# Patient Record
Sex: FEMALE | Race: WHITE | Employment: OTHER | URBAN - METROPOLITAN AREA
[De-identification: names, ages, dates, MRNs, and addresses within clinical notes are randomized per-mention and may not be internally consistent; named-entity substitution may affect disease eponyms.]

---

## 2017-01-30 RX ORDER — METOPROLOL SUCCINATE 50 MG/1
TABLET, EXTENDED RELEASE ORAL
Qty: 90 TABLET | Refills: 0 | Status: SHIPPED | OUTPATIENT
Start: 2017-01-30 | End: 2017-04-05

## 2017-02-15 RX ORDER — SIMVASTATIN 20 MG
TABLET ORAL
Qty: 90 TABLET | Refills: 0 | Status: SHIPPED | OUTPATIENT
Start: 2017-02-15 | End: 2017-05-30

## 2017-02-15 RX ORDER — LEVOTHYROXINE SODIUM 50 MCG
TABLET ORAL
Qty: 90 TABLET | Refills: 0 | Status: SHIPPED | OUTPATIENT
Start: 2017-02-15 | End: 2017-05-24

## 2017-03-28 RX ORDER — RANITIDINE 300 MG/1
TABLET ORAL
Qty: 90 TABLET | Refills: 0 | Status: SHIPPED | OUTPATIENT
Start: 2017-03-28 | End: 2017-06-27

## 2017-04-05 ENCOUNTER — OFFICE VISIT (OUTPATIENT)
Dept: FAMILY MEDICINE CLINIC | Facility: CLINIC | Age: 82
End: 2017-04-05

## 2017-04-05 ENCOUNTER — LAB ENCOUNTER (OUTPATIENT)
Dept: LAB | Age: 82
End: 2017-04-05
Attending: FAMILY MEDICINE
Payer: MEDICARE

## 2017-04-05 ENCOUNTER — HOSPITAL ENCOUNTER (OUTPATIENT)
Dept: GENERAL RADIOLOGY | Age: 82
Discharge: HOME | End: 2017-04-05
Attending: FAMILY MEDICINE
Payer: MEDICARE

## 2017-04-05 VITALS
DIASTOLIC BLOOD PRESSURE: 64 MMHG | HEART RATE: 60 BPM | WEIGHT: 176 LBS | HEIGHT: 66 IN | BODY MASS INDEX: 28.28 KG/M2 | RESPIRATION RATE: 16 BRPM | SYSTOLIC BLOOD PRESSURE: 118 MMHG | TEMPERATURE: 98 F

## 2017-04-05 DIAGNOSIS — J41.0 SIMPLE CHRONIC BRONCHITIS (HCC): ICD-10-CM

## 2017-04-05 DIAGNOSIS — E78.00 PURE HYPERCHOLESTEROLEMIA: ICD-10-CM

## 2017-04-05 DIAGNOSIS — C67.9 MALIGNANT NEOPLASM OF URINARY BLADDER, UNSPECIFIED SITE (HCC): ICD-10-CM

## 2017-04-05 DIAGNOSIS — I10 ESSENTIAL HYPERTENSION, BENIGN: ICD-10-CM

## 2017-04-05 DIAGNOSIS — R53.83 FATIGUE, UNSPECIFIED TYPE: ICD-10-CM

## 2017-04-05 DIAGNOSIS — J44.1 COPD EXACERBATION (HCC): ICD-10-CM

## 2017-04-05 DIAGNOSIS — Z11.59 SCREENING FOR VIRAL AND CHLAMYDIAL DISEASES: ICD-10-CM

## 2017-04-05 DIAGNOSIS — Z11.8 SCREENING FOR VIRAL AND CHLAMYDIAL DISEASES: ICD-10-CM

## 2017-04-05 DIAGNOSIS — Z12.11 SCREENING FOR COLON CANCER: ICD-10-CM

## 2017-04-05 DIAGNOSIS — Z12.31 VISIT FOR SCREENING MAMMOGRAM: ICD-10-CM

## 2017-04-05 DIAGNOSIS — K21.9 GASTROESOPHAGEAL REFLUX DISEASE WITHOUT ESOPHAGITIS: ICD-10-CM

## 2017-04-05 DIAGNOSIS — E55.9 VITAMIN D DEFICIENCY: ICD-10-CM

## 2017-04-05 DIAGNOSIS — F33.8 SEASONAL DEPRESSION (HCC): ICD-10-CM

## 2017-04-05 DIAGNOSIS — J30.1 SEASONAL ALLERGIC RHINITIS DUE TO POLLEN: ICD-10-CM

## 2017-04-05 DIAGNOSIS — L40.50 PSORIATIC ARTHRITIS (HCC): Primary | ICD-10-CM

## 2017-04-05 DIAGNOSIS — M47.812 FACET ARTHRITIS OF CERVICAL REGION: ICD-10-CM

## 2017-04-05 DIAGNOSIS — J41.0 SMOKERS' COUGH (HCC): ICD-10-CM

## 2017-04-05 DIAGNOSIS — L40.9 PSORIASIS: ICD-10-CM

## 2017-04-05 PROCEDURE — 81001 URINALYSIS AUTO W/SCOPE: CPT

## 2017-04-05 PROCEDURE — G0439 PPPS, SUBSEQ VISIT: HCPCS | Performed by: FAMILY MEDICINE

## 2017-04-05 PROCEDURE — 82306 VITAMIN D 25 HYDROXY: CPT

## 2017-04-05 PROCEDURE — 85025 COMPLETE CBC W/AUTO DIFF WBC: CPT

## 2017-04-05 PROCEDURE — 80061 LIPID PANEL: CPT

## 2017-04-05 PROCEDURE — 71020 XR CHEST PA + LAT CHEST (CPT=71020): CPT

## 2017-04-05 PROCEDURE — 84443 ASSAY THYROID STIM HORMONE: CPT

## 2017-04-05 PROCEDURE — 36415 COLL VENOUS BLD VENIPUNCTURE: CPT

## 2017-04-05 PROCEDURE — 96160 PT-FOCUSED HLTH RISK ASSMT: CPT | Performed by: FAMILY MEDICINE

## 2017-04-05 PROCEDURE — 80053 COMPREHEN METABOLIC PANEL: CPT

## 2017-04-05 PROCEDURE — 99397 PER PM REEVAL EST PAT 65+ YR: CPT | Performed by: FAMILY MEDICINE

## 2017-04-05 RX ORDER — DEXTROMETHORPHAN HYDROBROMIDE AND PROMETHAZINE HYDROCHLORIDE 15; 6.25 MG/5ML; MG/5ML
5 SYRUP ORAL 4 TIMES DAILY PRN
Qty: 200 ML | Refills: 0 | Status: SHIPPED | OUTPATIENT
Start: 2017-04-05 | End: 2017-04-12

## 2017-04-05 RX ORDER — VALSARTAN AND HYDROCHLOROTHIAZIDE 160; 25 MG/1; MG/1
1 TABLET ORAL
Qty: 90 TABLET | Refills: 4 | Status: SHIPPED | OUTPATIENT
Start: 2017-04-05 | End: 2018-08-06

## 2017-04-05 RX ORDER — LEVOFLOXACIN 500 MG/1
500 TABLET, FILM COATED ORAL DAILY
Qty: 7 TABLET | Refills: 0 | Status: SHIPPED | OUTPATIENT
Start: 2017-04-05 | End: 2017-07-25

## 2017-04-05 RX ORDER — PREDNISONE 20 MG/1
60 TABLET ORAL DAILY
Qty: 15 TABLET | Refills: 0 | Status: SHIPPED | OUTPATIENT
Start: 2017-04-05 | End: 2018-04-05 | Stop reason: ALTCHOICE

## 2017-04-05 RX ORDER — METOPROLOL SUCCINATE 50 MG/1
50 TABLET, EXTENDED RELEASE ORAL
Qty: 90 TABLET | Refills: 4 | Status: SHIPPED | OUTPATIENT
Start: 2017-04-05 | End: 2018-05-16

## 2017-04-05 NOTE — PROGRESS NOTES
Lynn Daniels is a 80year old female who presents for a MA Supervisit.     HPI:      Patient Care Team: Patient Care Team:  Sandeep Uriostegui MD as PCP - General (Family Practice)  Robert Collado MD as Consulting Physician (Brie Graves)  Ridge Mead MD a spray by Nasal route 2 (two) times daily. Disp: 1 Bottle Rfl: 3   MetroNIDAZOLE 1 % Apply Externally Gel  Disp:  Rfl: 4   methotrexate (RHEUMATREX) 2.5 MG Oral Tab  Disp:  Rfl:    folic acid (FOLVITE) 1 MG Oral Tab  Disp:  Rfl: 11   Fluocinonide (LIDEX) 0. 0.961 04/17/2014       Lab Results  Component Value Date   BUN 26* 04/15/2016   BUN 23 12/01/2015   BUN 44* 08/21/2015   CREATSERUM 0.98 04/15/2016   CREATSERUM 0.95 12/01/2015   CREATSERUM 1.50* 08/21/2015       General Health     In the past six months, Screening (PHQ-2/PHQ-9): Over the LAST 2 WEEKS   Little interest or pleasure in doing things (over the last two weeks)?: Several days    Feeling down, depressed, or hopeless (over the last two weeks)?: Several days    PHQ-2 SCORE: 2        Advance Directiv Dexascan Every two years Last Dexa Scan:   XR DEXA BONE DENSITOMETRY (CPT=77080) 04/22/2016    No flowsheet data found.     Pap and Pelvic      Pap: Every 3 yrs age 21-65 or Pap+HPV every 5 yrs age 33-67, age 72 and older at high risk   There are no prev data found.     Creat/alb ratio  Annually      LDL  Annually LDL CHOLESTEROL (mg/dL)   Date Value   04/15/2016 49     LDL-CHOLESTEROL (mg/dL (calc))   Date Value   07/22/2011 66     LDL CHOLESTROL (mg/dL)   Date Value   04/17/2014 81    No flowsheet data fo Application topically 2 (two) times daily. Disp: 60 g Rfl: 0   Clobetasol Propionate 0.05 % Apply Externally Ointment Apply  topically 2 (two) times daily.  Disp:  Rfl:    Acetaminophen (TYLENOL ARTHRITIS PAIN) 650 MG Oral Tab CR 2 TABLETS EVERY 8 HOURS AS Comment BTS Cysto- Dr. Caty Ibrahim HISTORY  7/12/16    Comment HELADIO Cysto- Dr. Raine Deluna      Family History   Problem Relation Age of Onset   • Cancer Father      liver   • Diabetes Father    • Heart Disorder Mother    • Diabetes Mother       Sonu Mata tenderness  : deferred  RECTAL: deferred  MUSCULOSKELETAL: back is not tender, FROM of the back  EXTREMITIES: no cyanosis, clubbing or edema  NEURO: Oriented times three, cranial nerves are intact, motor and sensory are grossly intact    ASSESSMENT AND P mouth daily. levofloxacin (LEVAQUIN) 500 MG Oral Tab 7 tablet 0      Sig: Take 1 tablet (500 mg total) by mouth daily. Promethazine-DM 6.25-15 MG/5ML Oral Syrup 200 mL 0      Sig: Take 5 mL by mouth 4 (four) times daily as needed.                Ruben Palencia 07/01/2011   • HEP B 07/01/2011   • Influenza 10/15/2007, 09/25/2008, 09/25/2009, 09/27/2010, 09/13/2011, 09/16/2013   • Influenza Vaccine, High Dose, Preserv Free 09/18/2014   • Pneumococcal (Prevnar 13) 04/15/2016   • TDAP 07/01/2011

## 2017-04-05 NOTE — PATIENT INSTRUCTIONS
Prevention Guidelines, Women Ages 72 and Older  Screening tests and vaccines are an important part of managing your health. Health counseling is essential, too. Below are guidelines for these, for women ages 72 and older.  Talk with your healthcare provid Lung cancer Adults age 54 to [de-identified] who have smoked Yearly screening in smokers with 30 pack-year history of smoking or who quit within 15 years   Obesity All women in this age group At routine exams   Osteoporosis All women in this age group Bone density test Counseling Who needs it How often   Diet and exercise Women who are overweight or obese When diagnosed, and then at routine exams   Fall prevention (exercise and vitamin D supplements) All women in this age group At routine exams   Sexually transmitted inf

## 2017-04-06 ENCOUNTER — APPOINTMENT (OUTPATIENT)
Dept: LAB | Age: 82
End: 2017-04-06
Attending: FAMILY MEDICINE
Payer: MEDICARE

## 2017-04-06 DIAGNOSIS — N39.0 URINARY TRACT INFECTION, SITE UNSPECIFIED: ICD-10-CM

## 2017-04-06 DIAGNOSIS — N39.0 URINARY TRACT INFECTION, SITE UNSPECIFIED: Primary | ICD-10-CM

## 2017-04-06 PROCEDURE — 87086 URINE CULTURE/COLONY COUNT: CPT | Performed by: FAMILY MEDICINE

## 2017-04-10 ENCOUNTER — TELEPHONE (OUTPATIENT)
Dept: FAMILY MEDICINE CLINIC | Facility: CLINIC | Age: 82
End: 2017-04-10

## 2017-04-10 NOTE — TELEPHONE ENCOUNTER
LMOM with Dr Murray's instructions to go to ER. I asked that she call back to confirm she got our message. LM on daughter's cell, Francisco Lane to call back. We would like another number to reach Janette Arita or Angelic Canales give Francisco Lane message to give to Janette Dose.   OK to call

## 2017-04-10 NOTE — TELEPHONE ENCOUNTER
Urine and GC/chlam neg    ----- Message from Matt Tamez MD sent at 4/8/2017  8:59 AM CDT -----  Normal results.      Notes Recorded by Pasquale Egan MD on 4/7/2017 at 12:37 PM  Normal results.

## 2017-04-10 NOTE — TELEPHONE ENCOUNTER
I called pt at  and verified 2 patient identifiers: name & . I discussed results with patient. All questions answered.

## 2017-04-10 NOTE — TELEPHONE ENCOUNTER
Pt states she is not any better. She has today and tomorrow for prednisone, on day 5 of 7 of Levaquin, cough syrup, and taking fluticasone nasal spray. Pt states she has a productive wet sounding cough.   Phlegm is thick and light yellow, nasal drainage i

## 2017-04-11 NOTE — TELEPHONE ENCOUNTER
Pt returned call, she states she got the message yesterday to go to ER but couldn't go because she was not home all day. She states there is a Limo waiting for her to take her to the airport, she is going to [place] for a week.   I explained to pt

## 2017-05-24 RX ORDER — LEVOTHYROXINE SODIUM 50 MCG
TABLET ORAL
Qty: 90 TABLET | Refills: 1 | Status: SHIPPED | OUTPATIENT
Start: 2017-05-24 | End: 2017-11-30

## 2017-05-30 RX ORDER — SIMVASTATIN 20 MG
TABLET ORAL
Qty: 90 TABLET | Refills: 0 | Status: SHIPPED | OUTPATIENT
Start: 2017-05-30 | End: 2017-08-21

## 2017-06-27 RX ORDER — RANITIDINE 300 MG/1
TABLET ORAL
Qty: 90 TABLET | Refills: 0 | Status: SHIPPED | OUTPATIENT
Start: 2017-06-27 | End: 2017-10-10

## 2017-07-25 PROCEDURE — 88108 CYTOPATH CONCENTRATE TECH: CPT | Performed by: UROLOGY

## 2017-08-08 RX ORDER — FLUOXETINE HYDROCHLORIDE 20 MG/1
CAPSULE ORAL
Qty: 90 CAPSULE | Refills: 0 | Status: SHIPPED | OUTPATIENT
Start: 2017-08-08 | End: 2017-11-10

## 2017-08-08 NOTE — TELEPHONE ENCOUNTER
Refill request for fluoxetine, no protocol, unable to refill medication. LOV 4/5/2017 LF 7/5/2016 #90 w/ 4.      Please advise, thank you

## 2017-08-21 RX ORDER — SIMVASTATIN 20 MG
TABLET ORAL
Qty: 90 TABLET | Refills: 0 | Status: SHIPPED | OUTPATIENT
Start: 2017-08-21 | End: 2017-11-20

## 2017-10-10 RX ORDER — RANITIDINE 300 MG/1
TABLET ORAL
Qty: 90 TABLET | Refills: 0 | Status: SHIPPED | OUTPATIENT
Start: 2017-10-10 | End: 2018-01-15

## 2017-10-10 NOTE — TELEPHONE ENCOUNTER
LOV:  04/05/17 LF: 06/27/17      Pending Prescriptions Disp Refills    RANITIDINE  MG Oral Tab [Pharmacy Med Name: RANITIDINE 300MG TABLETS] 90 tablet 0     Sig: TAKE 1 TABLET BY MOUTH DAILY       Appointment in past 6 or next 1 month    Please appr

## 2017-11-10 RX ORDER — FLUOXETINE HYDROCHLORIDE 20 MG/1
CAPSULE ORAL
Qty: 90 CAPSULE | Refills: 0 | Status: SHIPPED | OUTPATIENT
Start: 2017-11-10 | End: 2018-02-05

## 2017-11-10 NOTE — TELEPHONE ENCOUNTER
Medication(s) to Refill:   Pending Prescriptions Disp Refills    FLUOXETINE HCL 20 MG Oral Cap [Pharmacy Med Name: FLUOXETINE 20MG CAPSULES] 90 capsule 0     Sig: TAKE 1 CAPSULE BY MOUTH DAILY           Last Time Medication was Filled:  8/8/17    Last Offi

## 2017-11-20 RX ORDER — SIMVASTATIN 20 MG
TABLET ORAL
Qty: 90 TABLET | Refills: 0 | Status: SHIPPED | OUTPATIENT
Start: 2017-11-20 | End: 2018-02-19

## 2017-12-01 RX ORDER — LEVOTHYROXINE SODIUM 50 MCG
TABLET ORAL
Qty: 90 TABLET | Refills: 0 | Status: SHIPPED | OUTPATIENT
Start: 2017-12-01 | End: 2018-03-07

## 2018-01-16 RX ORDER — RANITIDINE 300 MG/1
TABLET ORAL
Qty: 90 TABLET | Refills: 0 | Status: SHIPPED | OUTPATIENT
Start: 2018-01-16 | End: 2018-04-18

## 2018-01-16 NOTE — TELEPHONE ENCOUNTER
Medication(s) to Refill:   Pending Prescriptions Disp Refills    RANITIDINE  MG Oral Tab [Pharmacy Med Name: RANITIDINE 300MG TABLETS] 90 tablet 0     Sig: TAKE 1 TABLET BY MOUTH DAILY           Last Time Medication was Filled:  10/10/17    Last Off

## 2018-02-05 RX ORDER — FLUOXETINE HYDROCHLORIDE 20 MG/1
CAPSULE ORAL
Qty: 90 CAPSULE | Refills: 0 | Status: SHIPPED | OUTPATIENT
Start: 2018-02-05 | End: 2018-05-07

## 2018-02-05 NOTE — TELEPHONE ENCOUNTER
Medication(s) to Refill:   Pending Prescriptions Disp Refills    FLUOXETINE HCL 20 MG Oral Cap [Pharmacy Med Name: FLUOXETINE 20MG CAPSULES] 90 capsule 0     Sig: TAKE 1 CAPSULE BY MOUTH DAILY           Last Time Medication was Filled:  11/10/2017    Last

## 2018-02-19 RX ORDER — SIMVASTATIN 20 MG
TABLET ORAL
Qty: 90 TABLET | Refills: 0 | Status: SHIPPED | OUTPATIENT
Start: 2018-02-19 | End: 2018-05-18

## 2018-02-22 ENCOUNTER — TELEPHONE (OUTPATIENT)
Dept: FAMILY MEDICINE CLINIC | Facility: CLINIC | Age: 83
End: 2018-02-22

## 2018-03-07 RX ORDER — LEVOTHYROXINE SODIUM 50 MCG
TABLET ORAL
Qty: 90 TABLET | Refills: 0 | Status: SHIPPED | OUTPATIENT
Start: 2018-03-07 | End: 2018-06-13

## 2018-03-22 ENCOUNTER — TELEPHONE (OUTPATIENT)
Dept: FAMILY MEDICINE CLINIC | Facility: CLINIC | Age: 83
End: 2018-03-22

## 2018-03-22 DIAGNOSIS — E78.00 PURE HYPERCHOLESTEROLEMIA: Primary | ICD-10-CM

## 2018-03-22 DIAGNOSIS — Z85.51 HISTORY OF BLADDER CANCER: ICD-10-CM

## 2018-03-22 DIAGNOSIS — R53.83 FATIGUE, UNSPECIFIED TYPE: ICD-10-CM

## 2018-03-22 NOTE — TELEPHONE ENCOUNTER
Please read message below. CBC,CMP, lipid and TSH orders placed. Would you like any additional lab orders placed? Please advise.  Thank you

## 2018-03-22 NOTE — TELEPHONE ENCOUNTER
Pt scheduled MA Supervisit for 04/05/18 and would like to get bloodwork done in advance. Per Pt, no need to call her to let her know labs are ready, she will just walk in on/near Mon Apr 2nd.

## 2018-04-02 ENCOUNTER — LAB ENCOUNTER (OUTPATIENT)
Dept: LAB | Age: 83
End: 2018-04-02
Attending: FAMILY MEDICINE
Payer: MEDICARE

## 2018-04-02 DIAGNOSIS — Z85.51 HISTORY OF BLADDER CANCER: ICD-10-CM

## 2018-04-02 DIAGNOSIS — E78.00 PURE HYPERCHOLESTEROLEMIA: ICD-10-CM

## 2018-04-02 DIAGNOSIS — R53.83 FATIGUE, UNSPECIFIED TYPE: ICD-10-CM

## 2018-04-02 PROCEDURE — 80061 LIPID PANEL: CPT | Performed by: FAMILY MEDICINE

## 2018-04-02 PROCEDURE — 85025 COMPLETE CBC W/AUTO DIFF WBC: CPT | Performed by: FAMILY MEDICINE

## 2018-04-02 PROCEDURE — 84443 ASSAY THYROID STIM HORMONE: CPT | Performed by: FAMILY MEDICINE

## 2018-04-02 PROCEDURE — 87086 URINE CULTURE/COLONY COUNT: CPT | Performed by: FAMILY MEDICINE

## 2018-04-02 PROCEDURE — 36415 COLL VENOUS BLD VENIPUNCTURE: CPT | Performed by: FAMILY MEDICINE

## 2018-04-02 PROCEDURE — 81001 URINALYSIS AUTO W/SCOPE: CPT | Performed by: FAMILY MEDICINE

## 2018-04-02 PROCEDURE — 80053 COMPREHEN METABOLIC PANEL: CPT | Performed by: FAMILY MEDICINE

## 2018-04-05 ENCOUNTER — OFFICE VISIT (OUTPATIENT)
Dept: FAMILY MEDICINE CLINIC | Facility: CLINIC | Age: 83
End: 2018-04-05

## 2018-04-05 VITALS
WEIGHT: 181 LBS | RESPIRATION RATE: 22 BRPM | DIASTOLIC BLOOD PRESSURE: 80 MMHG | BODY MASS INDEX: 29.09 KG/M2 | TEMPERATURE: 98 F | OXYGEN SATURATION: 98 % | SYSTOLIC BLOOD PRESSURE: 128 MMHG | HEART RATE: 72 BPM | HEIGHT: 66 IN

## 2018-04-05 DIAGNOSIS — M47.816 LUMBAR ARTHROPATHY: ICD-10-CM

## 2018-04-05 DIAGNOSIS — Z23 NEED FOR PNEUMOCOCCAL VACCINATION: Primary | ICD-10-CM

## 2018-04-05 DIAGNOSIS — F33.8 SEASONAL DEPRESSION (HCC): ICD-10-CM

## 2018-04-05 DIAGNOSIS — J30.1 SEASONAL ALLERGIC RHINITIS DUE TO POLLEN: ICD-10-CM

## 2018-04-05 DIAGNOSIS — J41.0 SIMPLE CHRONIC BRONCHITIS (HCC): ICD-10-CM

## 2018-04-05 DIAGNOSIS — L40.50 PSORIATIC ARTHRITIS (HCC): ICD-10-CM

## 2018-04-05 DIAGNOSIS — M47.812 FACET ARTHRITIS OF CERVICAL REGION: ICD-10-CM

## 2018-04-05 DIAGNOSIS — E78.00 PURE HYPERCHOLESTEROLEMIA: ICD-10-CM

## 2018-04-05 DIAGNOSIS — K21.9 GASTROESOPHAGEAL REFLUX DISEASE WITHOUT ESOPHAGITIS: ICD-10-CM

## 2018-04-05 DIAGNOSIS — L40.9 PSORIASIS: ICD-10-CM

## 2018-04-05 DIAGNOSIS — J41.0 SMOKERS' COUGH (HCC): ICD-10-CM

## 2018-04-05 DIAGNOSIS — C67.9 MALIGNANT NEOPLASM OF URINARY BLADDER, UNSPECIFIED SITE (HCC): ICD-10-CM

## 2018-04-05 DIAGNOSIS — I10 ESSENTIAL HYPERTENSION, BENIGN: ICD-10-CM

## 2018-04-05 PROBLEM — J44.1 COPD EXACERBATION (HCC): Status: RESOLVED | Noted: 2017-04-05 | Resolved: 2018-04-05

## 2018-04-05 PROCEDURE — 96160 PT-FOCUSED HLTH RISK ASSMT: CPT | Performed by: FAMILY MEDICINE

## 2018-04-05 PROCEDURE — G0009 ADMIN PNEUMOCOCCAL VACCINE: HCPCS | Performed by: FAMILY MEDICINE

## 2018-04-05 PROCEDURE — 99397 PER PM REEVAL EST PAT 65+ YR: CPT | Performed by: FAMILY MEDICINE

## 2018-04-05 PROCEDURE — G0439 PPPS, SUBSEQ VISIT: HCPCS | Performed by: FAMILY MEDICINE

## 2018-04-05 PROCEDURE — 90732 PPSV23 VACC 2 YRS+ SUBQ/IM: CPT | Performed by: FAMILY MEDICINE

## 2018-04-05 RX ORDER — TRAMADOL HYDROCHLORIDE 50 MG/1
50 TABLET ORAL EVERY 6 HOURS PRN
Qty: 90 TABLET | Refills: 0 | Status: SHIPPED
Start: 2018-04-05 | End: 2018-04-11 | Stop reason: ALTCHOICE

## 2018-04-05 RX ORDER — IXEKIZUMAB 80 MG/ML
INJECTION, SOLUTION SUBCUTANEOUS
COMMUNITY
Start: 2018-04-03 | End: 2018-09-25 | Stop reason: ALTCHOICE

## 2018-04-05 NOTE — PROGRESS NOTES
Flaquito Melara is a [de-identified]year old female who presents for a MA Supervisit.     HPI:    Patient Care Team: Patient Care Team:  Nirali Jung MD as PCP - General (Family Practice)  Rupal Hernandez MD as Consulting Physician (Margo Peace)  Haydee Moran MD as Clindamycin Phosphate 1 % Apply Externally Gel Apply 1 Application topically 2 (two) times daily. Disp: 60 g Rfl: 0   Clobetasol Propionate 0.05 % Apply Externally Ointment Apply  topically 2 (two) times daily.  Disp:  Rfl:    Acetaminophen (TYLENOL ARTHR good energy level?: Appropriate Exercise;Daily Walks; Other (weather permitting)  How would you describe your daily physical activity?: Moderate  How would you describe your current health state?: Fair  How do you maintain positive mental well-being?: [place] PRE[facility]   INDICATIONS AND SCHEDULE Internal Lab or Procedure   Diabetes Screening     HbgA1C   Annually HEMOGLOBIN A1c (% of total Hgb)   Date Value   07/29/2011 5.9 (H)     HGBA1C (%)   Date Value   04/17/2014 5.9 (H)     HgbA1C (%)   Date arthrocentesis aspiration of knee joint  No date: COLONOSCOPY  9/2/2015: CYSTOURETHROSCOPY,FULGUR 2-5CM LESN Right      Comment: Procedure: CYSTOSCOPY WITH RETROGRADE                PYELOGRAM;  Surgeon: Davonte Villeda MD;                 Location: [facility] Vaccine, High Dose, Preserv Free                          09/18/2014      Pneumococcal (Prevnar 13)                          04/15/2016      TDAP                  07/01/2011        SOCIAL HISTORY:   Smoking status: Former Smoker full flexion and extension. Normal muscles strength, 5/5 from upper extremities bilaterally.   R knee: swelling, no erythema, mild limitation on flexion secondary to pain, No effusion present., No palpable Baker's cyst., Lachman's test, negative., Anterior The patient indicates understanding of these issues and agrees to the plan. F/u in one week for knee injection.     SUGGESTED VACCINATIONS - Influenza, Pneumococcal, Zoster, Tetanus   Influenza: Influenza Vaccine(1) due on 09/01/2017  Pneumoni

## 2018-04-05 NOTE — PATIENT INSTRUCTIONS
Dr. Shari Welsh or Dr. Sakina Ledesma    Pain specialist.    Phone: [phone]      [address]  [place], [address] Dr. Anna Garza    Rheumatology. Dr. Alina Perla office.     Tel: [phone]

## 2018-04-09 ENCOUNTER — TELEPHONE (OUTPATIENT)
Dept: FAMILY MEDICINE CLINIC | Facility: CLINIC | Age: 83
End: 2018-04-09

## 2018-04-09 NOTE — TELEPHONE ENCOUNTER
----- Message from Delonte Vaughan MD sent at 4/4/2018  9:17 AM CDT -----  Normal urine culture, TGL high, low sugar diet, overall stable.

## 2018-04-10 ENCOUNTER — TELEPHONE (OUTPATIENT)
Dept: FAMILY MEDICINE CLINIC | Facility: CLINIC | Age: 83
End: 2018-04-10

## 2018-04-10 NOTE — TELEPHONE ENCOUNTER
Patient is trying to schedule a cortisone injection in the right knee she stated that she thinks that Dr Yanet Núñez is requesting this. Please call patient at [phone].

## 2018-04-10 NOTE — TELEPHONE ENCOUNTER
Pt calling to schedule appt for her knee injection. Can you add pt to you schedule next week for this? Or is it OK for her to wait until the week of 4/23-4/27?

## 2018-04-10 NOTE — TELEPHONE ENCOUNTER
Appt made for pt on 4/18 at 3:40 for her knee injection with Dr. Vannessa Salazar. Detailed message left on pt's VM per [employer] to call office back to confirm appt, if she can not make this appt, pt can schedule appt for week of 4/23-4/27.

## 2018-04-11 ENCOUNTER — OFFICE VISIT (OUTPATIENT)
Dept: SURGERY | Facility: CLINIC | Age: 83
End: 2018-04-11

## 2018-04-11 ENCOUNTER — TELEPHONE (OUTPATIENT)
Dept: SURGERY | Facility: CLINIC | Age: 83
End: 2018-04-11

## 2018-04-11 VITALS
HEIGHT: 66 IN | DIASTOLIC BLOOD PRESSURE: 70 MMHG | BODY MASS INDEX: 29.09 KG/M2 | WEIGHT: 181 LBS | SYSTOLIC BLOOD PRESSURE: 112 MMHG | HEART RATE: 60 BPM

## 2018-04-11 DIAGNOSIS — M47.816 LUMBAR ARTHROPATHY: ICD-10-CM

## 2018-04-11 DIAGNOSIS — M47.812 FACET ARTHRITIS OF CERVICAL REGION: ICD-10-CM

## 2018-04-11 DIAGNOSIS — L40.50 PSORIATIC ARTHRITIS (HCC): Primary | ICD-10-CM

## 2018-04-11 PROCEDURE — 99204 OFFICE O/P NEW MOD 45 MIN: CPT | Performed by: ANESTHESIOLOGY

## 2018-04-11 NOTE — PATIENT INSTRUCTIONS
Refill policies:    • Allow 2-3 business days for refills; controlled substances may take longer.   • Contact your pharmacy at least 5 days prior to running out of medication and have them send an electronic request or submit request through the [facility] for the entire amount billed. Precertification and Prior Authorizations  If your physician has recommended that you have a procedure or additional testing performed.   [employer] ([facility]) will contact your insurance carrier to obtain pr

## 2018-04-11 NOTE — H&P
Name: Rodriguez Salvador   : 1935   DOS: 2018     Chief complaint: Low back  and neck pain    History of present illness:  Rodriguez Salvador is a [de-identified]year old female with a long-standing history of psoriatic and osteoarthritis who presents today for evalu Cap TAKE 1 CAPSULE BY MOUTH DAILY Disp: 90 capsule Rfl: 0   RANITIDINE  MG Oral Tab TAKE 1 TABLET BY MOUTH DAILY Disp: 90 tablet Rfl: 0   Metoprolol Succinate ER 50 MG Oral Tablet 24 Hr Take 1 tablet (50 mg total) by mouth once daily.  Disp: 90 table MD;                 Location: [address] [place] [address]  12/28/2015: MITOMYCIN 5 MG INJ N/A      Comment: Procedure: CYSTOSCOPY TRANSURETHRAL RESECTION                OF BLADDER TUMOR W/ MITOMYCIN;  Surgeon: Dejah Monk MD;  Location: [facility] 5  Cardiovascular system: Regular rate and rhythm. S1, S2 normal. No murmurs. Respiratory system: Breath sounds equal bilaterally. No crackles, rhonchi. Abdomen: Soft, nontender, no organomegaly. Bowel sounds positive.   Neurologic:  Cranial nerves II thr radiculopathy and limited range of motion. In terms her back pain she had positive facet loading on the left. I am sure she has arthritic changes in the knee as well. We discussed possible Synvisc injections for the knee.   The patient can follow-up afte

## 2018-04-11 NOTE — PROGRESS NOTES
HPI:    Patient ID: Annie Cool is a [de-identified]year old female. HPI    Review of Systems         Current Outpatient Prescriptions:  methotrexate 2.5 MG Oral Tab Take 2.5 mg by mouth once a week.  Disp:  Rfl:    TALTZ 80 MG/ML Subcutaneous Solution Auto-injecto No orders of the defined types were placed in this encounter.       Meds This Visit:  No prescriptions requested or ordered in this encounter    Imaging & Referrals:  None       ID#[id number]    Location of Pain: neck, bilateral hands, low back    Date Pain Began

## 2018-04-18 ENCOUNTER — OFFICE VISIT (OUTPATIENT)
Dept: FAMILY MEDICINE CLINIC | Facility: CLINIC | Age: 83
End: 2018-04-18

## 2018-04-18 VITALS
WEIGHT: 182 LBS | RESPIRATION RATE: 18 BRPM | HEART RATE: 66 BPM | BODY MASS INDEX: 31.07 KG/M2 | HEIGHT: 64.25 IN | DIASTOLIC BLOOD PRESSURE: 80 MMHG | SYSTOLIC BLOOD PRESSURE: 126 MMHG | OXYGEN SATURATION: 98 % | TEMPERATURE: 98 F

## 2018-04-18 DIAGNOSIS — M17.11 PRIMARY OSTEOARTHRITIS OF RIGHT KNEE: Primary | ICD-10-CM

## 2018-04-18 PROCEDURE — 89060 EXAM SYNOVIAL FLUID CRYSTALS: CPT | Performed by: FAMILY MEDICINE

## 2018-04-18 PROCEDURE — 89051 BODY FLUID CELL COUNT: CPT | Performed by: FAMILY MEDICINE

## 2018-04-18 PROCEDURE — 20610 DRAIN/INJ JOINT/BURSA W/O US: CPT | Performed by: FAMILY MEDICINE

## 2018-04-18 RX ORDER — TRIAMCINOLONE ACETONIDE 40 MG/ML
40 INJECTION, SUSPENSION INTRA-ARTICULAR; INTRAMUSCULAR ONCE
Status: COMPLETED | OUTPATIENT
Start: 2018-04-18 | End: 2018-04-18

## 2018-04-18 RX ORDER — RANITIDINE 300 MG/1
TABLET ORAL
Qty: 90 TABLET | Refills: 0 | Status: SHIPPED | OUTPATIENT
Start: 2018-04-18 | End: 2018-04-27

## 2018-04-18 RX ORDER — FOLIC ACID 1 MG/1
1 TABLET ORAL DAILY
Qty: 30 TABLET | Refills: 11 | Status: SHIPPED | OUTPATIENT
Start: 2018-04-18 | End: 2019-05-16

## 2018-04-18 RX ORDER — TRAMADOL HYDROCHLORIDE 50 MG/1
50 TABLET ORAL EVERY 8 HOURS PRN
Refills: 0 | COMMUNITY
Start: 2018-04-05

## 2018-04-18 RX ORDER — RANITIDINE 300 MG/1
300 TABLET ORAL
Qty: 90 TABLET | Refills: 3 | Status: SHIPPED | OUTPATIENT
Start: 2018-04-18 | End: 2019-03-22

## 2018-04-18 RX ADMIN — TRIAMCINOLONE ACETONIDE 40 MG: 40 INJECTION, SUSPENSION INTRA-ARTICULAR; INTRAMUSCULAR at 16:37:00

## 2018-04-18 NOTE — TELEPHONE ENCOUNTER
Medication(s) to Refill:   Pending Prescriptions Disp Refills    RANITIDINE  MG Oral Tab [Pharmacy Med Name: RANITIDINE 300MG TABLETS] 90 tablet 0     Sig: TAKE 1 TABLET BY MOUTH DAILY                   Last Office Visit with PCP: 4/5/2018      Barbara

## 2018-04-19 ENCOUNTER — TELEPHONE (OUTPATIENT)
Dept: FAMILY MEDICINE CLINIC | Facility: CLINIC | Age: 83
End: 2018-04-19

## 2018-04-19 NOTE — TELEPHONE ENCOUNTER
----- Message from Kay Hidalgo MD sent at 4/19/2018 10:37 AM CDT -----  Pt has some crystals, we call it pseudogout, ok to see Dr. Kirsty Tadeo rheumatology. Ask pt how is she doing after the knee injection.

## 2018-04-19 NOTE — TELEPHONE ENCOUNTER
Paul Cunningham M.D. Rheumatology   [facility]     [address] [address] [place]   Phone: [phone]

## 2018-04-20 NOTE — TELEPHONE ENCOUNTER
OSIRIS  Spoke with patient. She said she is doing fine since the injection and her knee is starting to feel better. Advised her pf pseudogout and to see rheumatology. Says she already has appointment.

## 2018-04-27 ENCOUNTER — OFFICE VISIT (OUTPATIENT)
Dept: SURGERY | Facility: CLINIC | Age: 83
End: 2018-04-27

## 2018-04-27 VITALS
WEIGHT: 182 LBS | BODY MASS INDEX: 31.07 KG/M2 | HEART RATE: 62 BPM | SYSTOLIC BLOOD PRESSURE: 128 MMHG | DIASTOLIC BLOOD PRESSURE: 50 MMHG | HEIGHT: 64.25 IN

## 2018-04-27 DIAGNOSIS — M47.816 LUMBAR ARTHROPATHY: ICD-10-CM

## 2018-04-27 DIAGNOSIS — M47.812 FACET ARTHRITIS OF CERVICAL REGION: Primary | ICD-10-CM

## 2018-04-27 DIAGNOSIS — M54.12 CERVICAL RADICULOPATHY: ICD-10-CM

## 2018-04-27 PROCEDURE — 99214 OFFICE O/P EST MOD 30 MIN: CPT | Performed by: ANESTHESIOLOGY

## 2018-04-27 NOTE — PATIENT INSTRUCTIONS
Refill policies:    • Allow 2-3 business days for refills; controlled substances may take longer.   • Contact your pharmacy at least 5 days prior to running out of medication and have them send an electronic request or submit request through the “request re entire amount billed. Precertification and Prior Authorizations: If your physician has recommended that you have a procedure or additional testing performed.   [facility]) will contact your insurance carrier to obtain pre-certi update us prior to the procedure if you are experiencing any symptoms of infection such as cough, fever, chills, urinary symptoms, or have recently been prescribed antibiotics, have open wounds, have recently had surgery or dental procedures.       Day of P Authorization:   Most insurances are now requiring a preauthorization for all procedures.   In the event that your insurance does not authorize your procedure within 48 hours of the scheduled date, your procedure will be cancelled and rescheduled to a later

## 2018-04-27 NOTE — PROGRESS NOTES
Name: Claribel Avila   : 1935   DOS: 2018     Pain Clinic Follow Up Visit:   Patient presents with:   Follow - Up      Claribel Avila is a [de-identified]year old female with a history of neck pain and radicular symptoms primarily down the left shoulder and ar Suspension 1 spray by Nasal route 2 (two) times daily. Disp: 1 Bottle Rfl: 3   MetroNIDAZOLE 1 % Apply Externally Gel  Disp:  Rfl: 4   Clindamycin Phosphate 1 % Apply Externally Gel Apply 1 Application topically 2 (two) times daily.  Disp: 60 g Rfl: 0   Viky discussed her new findings of gouty crystals in her knee. She certainly has arthritis in the knee. However, I will defer any intra-articular knee injection until a decision is made on how to best manage her gouty arthritis.   All questions addressed to pa

## 2018-05-01 PROBLEM — M11.20 PSEUDOGOUT: Status: ACTIVE | Noted: 2018-05-01

## 2018-05-07 RX ORDER — FLUOXETINE HYDROCHLORIDE 20 MG/1
CAPSULE ORAL
Qty: 90 CAPSULE | Refills: 0 | Status: SHIPPED | OUTPATIENT
Start: 2018-05-07 | End: 2018-08-13

## 2018-05-09 ENCOUNTER — TELEPHONE (OUTPATIENT)
Dept: NEUROLOGY | Facility: CLINIC | Age: 83
End: 2018-05-09

## 2018-05-09 NOTE — TELEPHONE ENCOUNTER
Spoke to Bakari Patrick at [facility], codes 08076 are valid and billable, no prior authorization or predetermination required.  Call reference: Flex Torres 5.9.18 call time 8:25

## 2018-05-16 ENCOUNTER — OFFICE VISIT (OUTPATIENT)
Dept: FAMILY MEDICINE CLINIC | Facility: CLINIC | Age: 83
End: 2018-05-16

## 2018-05-16 ENCOUNTER — TELEPHONE (OUTPATIENT)
Dept: FAMILY MEDICINE CLINIC | Facility: CLINIC | Age: 83
End: 2018-05-16

## 2018-05-16 VITALS
BODY MASS INDEX: 31.07 KG/M2 | OXYGEN SATURATION: 99 % | SYSTOLIC BLOOD PRESSURE: 128 MMHG | HEART RATE: 74 BPM | RESPIRATION RATE: 18 BRPM | TEMPERATURE: 99 F | WEIGHT: 182 LBS | HEIGHT: 64 IN | DIASTOLIC BLOOD PRESSURE: 82 MMHG

## 2018-05-16 DIAGNOSIS — M17.11 PRIMARY OSTEOARTHRITIS OF RIGHT KNEE: Primary | ICD-10-CM

## 2018-05-16 PROBLEM — M11.261 PSEUDOGOUT OF RIGHT KNEE: Status: ACTIVE | Noted: 2018-05-16

## 2018-05-16 PROCEDURE — 20610 DRAIN/INJ JOINT/BURSA W/O US: CPT | Performed by: FAMILY MEDICINE

## 2018-05-16 RX ORDER — METHYLPREDNISOLONE 4 MG/1
TABLET ORAL
Qty: 1 KIT | Refills: 0 | Status: SHIPPED | OUTPATIENT
Start: 2018-05-16 | End: 2018-07-13 | Stop reason: ALTCHOICE

## 2018-05-16 RX ORDER — METOPROLOL SUCCINATE 50 MG/1
50 TABLET, EXTENDED RELEASE ORAL
Qty: 90 TABLET | Refills: 4 | Status: SHIPPED | OUTPATIENT
Start: 2018-05-16 | End: 2019-06-01

## 2018-05-16 NOTE — TELEPHONE ENCOUNTER
Spoke with patient and advised her that observation or using the Tramadol sparingly is OK. I explained that a Medrol Dosepak was sent to [facility] should she experience knee pain while she is in [place].  Patient verbalized understanding and agreeable w/ PO

## 2018-05-16 NOTE — TELEPHONE ENCOUNTER
----- Message from Jenelle Matson MD sent at 5/16/2018 12:39 PM CDT -----  Please let the pt know, I can send the prescription.     ----- Message -----  From: Jud Lindquist MD  Sent: 5/16/2018  11:50 AM  To: Jenelle Matson MD    She can take the tramad

## 2018-05-16 NOTE — TELEPHONE ENCOUNTER
Pt states she does have Tramadol at home, pt expressed concern due to being on multiple medications the addiction warning makes her uncomfortable. Pt states she will only take the Tramadol at night for severe pain.

## 2018-05-16 NOTE — TELEPHONE ENCOUNTER
That's ok, that was a suggestion of Dr. Peace Maloney, I am ok with observation for now and please send Medrol pack, pt will use it if any pain in [place].

## 2018-05-18 RX ORDER — SIMVASTATIN 20 MG
TABLET ORAL
Qty: 90 TABLET | Refills: 0 | Status: SHIPPED | OUTPATIENT
Start: 2018-05-18 | End: 2018-08-13

## 2018-05-22 ENCOUNTER — TELEPHONE (OUTPATIENT)
Dept: FAMILY MEDICINE CLINIC | Facility: CLINIC | Age: 83
End: 2018-05-22

## 2018-05-22 DIAGNOSIS — M25.561 RIGHT KNEE PAIN, UNSPECIFIED CHRONICITY: Primary | ICD-10-CM

## 2018-05-22 NOTE — TELEPHONE ENCOUNTER
I called Dr. Yung Chacko office & spoke to [facility] he can see pt in about 15 min. Or Thursday morning. He does take pt's insurance. I informed her I would call pt right away.  I spoke to pt, she states she can go but she thinks it may be more like 30 min to get

## 2018-05-22 NOTE — TELEPHONE ENCOUNTER
Pt states she is having severe right knee pain, it started Saturday. She is having pain in the front & now in the back of Rt knee shooting up back of thigh. Knee is swollen & hurts to touch. Difficulty walking.   She denies fever but c/o of chills this AM

## 2018-05-22 NOTE — TELEPHONE ENCOUNTER
Get the pt to see ortho today asap, ok to call Berenice Maravilla etc.Can be gout. Ok to start Medrol too we can refill it

## 2018-05-22 NOTE — TELEPHONE ENCOUNTER
Patient called having severe right knee pain front and back. Patient is having a difficult time walking. Patient saw her on 5/16/18 and also gave her an injection.  Please call patient at [phone]

## 2018-05-22 NOTE — TELEPHONE ENCOUNTER
I spoke with Dr. Uzma Carrillo office, Dr. Leopold Drones can see pt tomorrow at 2:45 in [place]. No other available appts. Pt notified to start Medrol pack & of appt time.   Pt said his office just called & changed her appt to 5/24 with Dr. Charles Biggs instead,

## 2018-05-23 NOTE — TELEPHONE ENCOUNTER
I spoke with pt, she saw Dr. Pulido Client & is feeling better today. Pt agreed to have me cancel her 5/24 appt with Dr. Sera Barbosa. Dr. Rafat Huang office called & pt's appt was cancelled.

## 2018-05-24 ENCOUNTER — MED REC SCAN ONLY (OUTPATIENT)
Dept: FAMILY MEDICINE CLINIC | Facility: CLINIC | Age: 83
End: 2018-05-24

## 2018-05-25 ENCOUNTER — TELEPHONE (OUTPATIENT)
Dept: SURGERY | Facility: CLINIC | Age: 83
End: 2018-05-25

## 2018-05-25 NOTE — TELEPHONE ENCOUNTER
Spoke to patient, confirmed procedure date of 5/30/18 at 8:45 am to be checked in at outpatient registration. Patient called pre-procedure line and understood instructions/Patient instructed to call pre-procedure line before procedure at [phone].  Krysta

## 2018-05-30 ENCOUNTER — HOSPITAL ENCOUNTER (OUTPATIENT)
Facility: HOSPITAL | Age: 83
Setting detail: HOSPITAL OUTPATIENT SURGERY
Discharge: HOME | End: 2018-05-30
Attending: ANESTHESIOLOGY | Admitting: ANESTHESIOLOGY
Payer: MEDICARE

## 2018-05-30 ENCOUNTER — SURGERY (OUTPATIENT)
Age: 83
End: 2018-05-30

## 2018-05-30 ENCOUNTER — APPOINTMENT (OUTPATIENT)
Dept: GENERAL RADIOLOGY | Facility: HOSPITAL | Age: 83
End: 2018-05-30
Attending: ANESTHESIOLOGY
Payer: MEDICARE

## 2018-05-30 VITALS
HEART RATE: 55 BPM | TEMPERATURE: 98 F | DIASTOLIC BLOOD PRESSURE: 59 MMHG | SYSTOLIC BLOOD PRESSURE: 136 MMHG | OXYGEN SATURATION: 100 % | RESPIRATION RATE: 18 BRPM

## 2018-05-30 DIAGNOSIS — M54.12 CERVICAL RADICULOPATHY: ICD-10-CM

## 2018-05-30 PROCEDURE — 3E0R3KZ INTRODUCTION OF OTHER DIAGNOSTIC SUBSTANCE INTO SPINAL CANAL, PERCUTANEOUS APPROACH: ICD-10-PCS | Performed by: ANESTHESIOLOGY

## 2018-05-30 PROCEDURE — 3E0R33Z INTRODUCTION OF ANTI-INFLAMMATORY INTO SPINAL CANAL, PERCUTANEOUS APPROACH: ICD-10-PCS | Performed by: ANESTHESIOLOGY

## 2018-05-30 RX ORDER — SODIUM CHLORIDE, SODIUM LACTATE, POTASSIUM CHLORIDE, CALCIUM CHLORIDE 600; 310; 30; 20 MG/100ML; MG/100ML; MG/100ML; MG/100ML
100 INJECTION, SOLUTION INTRAVENOUS CONTINUOUS
Status: DISCONTINUED | OUTPATIENT
Start: 2018-05-30 | End: 2018-05-30

## 2018-05-30 RX ORDER — DIPHENHYDRAMINE HYDROCHLORIDE 50 MG/ML
50 INJECTION INTRAMUSCULAR; INTRAVENOUS ONCE AS NEEDED
Status: DISCONTINUED | OUTPATIENT
Start: 2018-05-30 | End: 2018-05-30

## 2018-05-30 RX ORDER — 0.9 % SODIUM CHLORIDE 0.9 %
VIAL (ML) INJECTION AS NEEDED
Status: DISCONTINUED | OUTPATIENT
Start: 2018-05-30 | End: 2018-05-30 | Stop reason: HOSPADM

## 2018-05-30 RX ORDER — DEXAMETHASONE SODIUM PHOSPHATE 10 MG/ML
INJECTION, SOLUTION INTRAMUSCULAR; INTRAVENOUS AS NEEDED
Status: DISCONTINUED | OUTPATIENT
Start: 2018-05-30 | End: 2018-05-30 | Stop reason: HOSPADM

## 2018-05-30 RX ORDER — ONDANSETRON 2 MG/ML
4 INJECTION INTRAMUSCULAR; INTRAVENOUS ONCE AS NEEDED
Status: DISCONTINUED | OUTPATIENT
Start: 2018-05-30 | End: 2018-05-30

## 2018-05-30 RX ORDER — LIDOCAINE HYDROCHLORIDE 10 MG/ML
INJECTION, SOLUTION EPIDURAL; INFILTRATION; INTRACAUDAL; PERINEURAL AS NEEDED
Status: DISCONTINUED | OUTPATIENT
Start: 2018-05-30 | End: 2018-05-30 | Stop reason: HOSPADM

## 2018-05-30 NOTE — H&P
History & Physical Examination    Patient Name: Denise Oliveira  MRN: [id number]  CSN: [id number]  YOB: 1935    Pre-Operative Diagnosis:  Cervical radiculopathy [M54.12]    Present Illness: Patient with cervical radiculopathy here for cervical ep Medications:  lactated ringers infusion 100 mL/hr Intravenous Continuous       Allergies:   Amoxil                  RASH    Comment:TABS  Augmentin, [Amoxici*    UNKNOWN  Cephalosporins              Comment:hives  Penicillins                 Comment:hives [address]  Family History   Problem Relation Age of Onset   • Cancer Father      liver   • Diabetes Father    • Heart Disorder Mother    • Diabetes Mother         Smoking status: Former Smoker     Smokeless tobacco: Never Used    Alcohol use Yes    Comment: so

## 2018-05-30 NOTE — OPERATIVE REPORT
[facility]  Operative Report  2018     Gracia Nayely Patient Status:  Hospital Outpatient Surgery    1935 MRN [id number]   [facility] Attending Brodie Hernandez MD   Hosp Day # 0 PCP Yadira Leonard MD     Indication: [employer] to a responsible adult after discharge criteria were met. Complications: None. Follow up: The patient was followed in the pain clinic as needed basis.           Alicia Tomlin MD

## 2018-05-30 NOTE — PROGRESS NOTES
Paged by PACU RN staff secondary patient complained of left arm and left soreness. Patient examined. She is alert and oriented ×3. Able to converse. Vital signs are stable. Motor and neurological exam was done with RN staff present.   She is able to us

## 2018-05-30 NOTE — OR NURSING
Dr. Nunu Jenkins notified about pt's continued numbness/discomfort in left axilla and left arm. Hand grasp strong. Pt had received 500cc IV bolus. Heartrate 55, /62. Pt denied headache. No orders received.   Pt instructed to call Dr. Nunu Jenkins with any concer

## 2018-06-13 RX ORDER — LEVOTHYROXINE SODIUM 50 MCG
TABLET ORAL
Qty: 90 TABLET | Refills: 0 | Status: SHIPPED | OUTPATIENT
Start: 2018-06-13 | End: 2018-09-08

## 2018-06-13 NOTE — TELEPHONE ENCOUNTER
Medication(s) to Refill:   Pending Prescriptions Disp Refills    SYNTHROID 48 MCG Oral Tab [Pharmacy Med Name: SYNTHROID 0.05MG (50MCG)TABLETS] 90 tablet 0     Sig: TAKE 1 TABLET BY MOUTH EVERY DAY             Reason for Medication Refill being sent to [facility]

## 2018-06-14 ENCOUNTER — MED REC SCAN ONLY (OUTPATIENT)
Dept: FAMILY MEDICINE CLINIC | Facility: CLINIC | Age: 83
End: 2018-06-14

## 2018-06-15 ENCOUNTER — TELEPHONE (OUTPATIENT)
Dept: SURGERY | Facility: CLINIC | Age: 83
End: 2018-06-15

## 2018-06-15 NOTE — TELEPHONE ENCOUNTER
Left message for patient, confirmed procedure date of 6/20/18 and to be checked in at outpatient registration at 10:00 am. Patient instructed to call pre-procedure line before procedure at [phone].  Patient instructed to call office if there are additi

## 2018-06-15 NOTE — TELEPHONE ENCOUNTER
Spoke with patient and confirmed her desire to cancel her next injection. At this time she does not want to schedule a follow up appointment. Pt. Has no other needs or questions at this time.

## 2018-06-20 ENCOUNTER — TELEPHONE (OUTPATIENT)
Dept: FAMILY MEDICINE CLINIC | Facility: CLINIC | Age: 83
End: 2018-06-20

## 2018-06-20 DIAGNOSIS — Z01.818 PRE-OP TESTING: Primary | ICD-10-CM

## 2018-06-25 ENCOUNTER — LAB ENCOUNTER (OUTPATIENT)
Dept: LAB | Age: 83
End: 2018-06-25
Attending: FAMILY MEDICINE
Payer: MEDICARE

## 2018-06-25 DIAGNOSIS — Z01.818 PRE-OP TESTING: ICD-10-CM

## 2018-06-25 PROCEDURE — 36415 COLL VENOUS BLD VENIPUNCTURE: CPT

## 2018-06-25 PROCEDURE — 80053 COMPREHEN METABOLIC PANEL: CPT

## 2018-06-25 PROCEDURE — 85025 COMPLETE CBC W/AUTO DIFF WBC: CPT

## 2018-06-26 ENCOUNTER — TELEPHONE (OUTPATIENT)
Dept: FAMILY MEDICINE CLINIC | Facility: CLINIC | Age: 83
End: 2018-06-26

## 2018-07-13 ENCOUNTER — OFFICE VISIT (OUTPATIENT)
Dept: FAMILY MEDICINE CLINIC | Facility: CLINIC | Age: 83
End: 2018-07-13

## 2018-07-13 VITALS
TEMPERATURE: 99 F | OXYGEN SATURATION: 98 % | HEIGHT: 64 IN | BODY MASS INDEX: 31.58 KG/M2 | SYSTOLIC BLOOD PRESSURE: 124 MMHG | DIASTOLIC BLOOD PRESSURE: 74 MMHG | RESPIRATION RATE: 22 BRPM | HEART RATE: 62 BPM | WEIGHT: 185 LBS

## 2018-07-13 DIAGNOSIS — M47.812 ARTHROPATHY OF CERVICAL FACET JOINT: ICD-10-CM

## 2018-07-13 DIAGNOSIS — Z01.818 PREOP GENERAL PHYSICAL EXAM: Primary | ICD-10-CM

## 2018-07-13 DIAGNOSIS — I10 ESSENTIAL HYPERTENSION, BENIGN: ICD-10-CM

## 2018-07-13 DIAGNOSIS — M48.02 CERVICAL STENOSIS OF SPINE: ICD-10-CM

## 2018-07-13 DIAGNOSIS — E78.00 PURE HYPERCHOLESTEROLEMIA: ICD-10-CM

## 2018-07-13 PROBLEM — M11.20 PSEUDOGOUT: Status: RESOLVED | Noted: 2018-05-01 | Resolved: 2018-07-13

## 2018-07-13 PROCEDURE — 99214 OFFICE O/P EST MOD 30 MIN: CPT | Performed by: FAMILY MEDICINE

## 2018-07-13 PROCEDURE — 93000 ELECTROCARDIOGRAM COMPLETE: CPT | Performed by: FAMILY MEDICINE

## 2018-07-13 NOTE — PROGRESS NOTES
CC: Preop exam.      HPI:    Don Branch is a [de-identified]year old female who presents for a pre-operative physical exam  By Dr. Stock Artist  request. Patient is to have cervical surgery  ,secondary to cervical stenosis and cervical facet arthritis   to be on  7/23/1 times daily. Disp: 1 Bottle Rfl: 3   MetroNIDAZOLE 1 % Apply Externally Gel  Disp:  Rfl: 4   Clindamycin Phosphate 1 % Apply Externally Gel Apply 1 Application topically 2 (two) times daily.  Disp: 60 g Rfl: 0   Clobetasol Propionate 0.05 % Apply Externally Mckenzie Peguero MD;  Location: [address] [place] [address]  1/1/1974: [facility]  1/1/99: OTHER      Comment: gastrotomy with suture of bleeding ulcer  12/1/15: OTHER SURGICAL HISTORY      Comment: BTS Cysto - Dr. Dinh Bhatt  4/19/16: OTHER SURGICAL HIST good BS's,no masses, HSM or tenderness  : deferred  MUSCULOSKELETAL: back is not tender,FROM of the back  EXTREMITIES: no cyanosis, clubbing or edema  NEURO: Oriented times three,cranial nerves are intact,motor and sensory are grossly intact    ASSESSMEN

## 2018-08-06 DIAGNOSIS — I10 ESSENTIAL HYPERTENSION, BENIGN: ICD-10-CM

## 2018-08-06 RX ORDER — VALSARTAN AND HYDROCHLOROTHIAZIDE 160; 25 MG/1; MG/1
TABLET ORAL
Qty: 90 TABLET | Refills: 0 | Status: SHIPPED | OUTPATIENT
Start: 2018-08-06 | End: 2018-11-12

## 2018-08-14 RX ORDER — FLUOXETINE HYDROCHLORIDE 20 MG/1
CAPSULE ORAL
Qty: 90 CAPSULE | Refills: 0 | Status: SHIPPED | OUTPATIENT
Start: 2018-08-14 | End: 2018-12-13

## 2018-08-14 RX ORDER — SIMVASTATIN 20 MG
TABLET ORAL
Qty: 90 TABLET | Refills: 0 | Status: SHIPPED | OUTPATIENT
Start: 2018-08-14 | End: 2018-12-13

## 2018-08-15 ENCOUNTER — MED REC SCAN ONLY (OUTPATIENT)
Dept: FAMILY MEDICINE CLINIC | Facility: CLINIC | Age: 83
End: 2018-08-15

## 2018-08-30 ENCOUNTER — MED REC SCAN ONLY (OUTPATIENT)
Dept: FAMILY MEDICINE CLINIC | Facility: CLINIC | Age: 83
End: 2018-08-30

## 2018-09-10 RX ORDER — LEVOTHYROXINE SODIUM 50 MCG
TABLET ORAL
Qty: 90 TABLET | Refills: 0 | Status: SHIPPED | OUTPATIENT
Start: 2018-09-10 | End: 2019-01-10

## 2018-09-11 ENCOUNTER — TELEPHONE (OUTPATIENT)
Dept: FAMILY MEDICINE CLINIC | Facility: CLINIC | Age: 83
End: 2018-09-11

## 2018-09-11 RX ORDER — NYSTATIN 10B UNIT
1 POWDER (EA) MISCELLANEOUS 3 TIMES DAILY PRN
Qty: 1 BOTTLE | Refills: 1 | Status: SHIPPED | OUTPATIENT
Start: 2018-09-11 | End: 2018-09-25 | Stop reason: ALTCHOICE

## 2018-09-11 NOTE — TELEPHONE ENCOUNTER
Home health called regarding patient she is being seen for neck incision after neck surgery yeast infection right breast noticed yesterday

## 2018-09-21 ENCOUNTER — TELEPHONE (OUTPATIENT)
Dept: FAMILY MEDICINE CLINIC | Facility: CLINIC | Age: 83
End: 2018-09-21

## 2018-09-21 RX ORDER — CLINDAMYCIN PHOSPHATE 10 MG/G
1 GEL TOPICAL NIGHTLY
Qty: 45 G | Refills: 3 | Status: SHIPPED | OUTPATIENT
Start: 2018-09-21 | End: 2019-11-18

## 2018-09-21 RX ORDER — KETOCONAZOLE 20 MG/G
1 CREAM TOPICAL 2 TIMES DAILY
Qty: 60 G | Refills: 0 | Status: SHIPPED | OUTPATIENT
Start: 2018-09-21 | End: 2020-01-15 | Stop reason: ALTCHOICE

## 2018-09-21 NOTE — TELEPHONE ENCOUNTER
Patient's yeast infection came back (see TE 9/11/18), it was clear for a while but it flared up again. She still has some of her medication from the last time left but is wondering if maybe she is needing something else.

## 2018-09-21 NOTE — TELEPHONE ENCOUNTER
Please see below message. Pt given Nystatin Topical Rx on 9/11 for yeast infection under right breast.  Please advise on any orders.

## 2018-09-25 ENCOUNTER — HOSPITAL ENCOUNTER (OUTPATIENT)
Dept: GENERAL RADIOLOGY | Age: 83
Discharge: HOME | End: 2018-09-25
Attending: NURSE PRACTITIONER
Payer: MEDICARE

## 2018-09-25 ENCOUNTER — LAB ENCOUNTER (OUTPATIENT)
Dept: LAB | Age: 83
End: 2018-09-25
Attending: FAMILY MEDICINE
Payer: MEDICARE

## 2018-09-25 ENCOUNTER — OFFICE VISIT (OUTPATIENT)
Dept: FAMILY MEDICINE CLINIC | Facility: CLINIC | Age: 83
End: 2018-09-25
Payer: COMMERCIAL

## 2018-09-25 VITALS
RESPIRATION RATE: 16 BRPM | TEMPERATURE: 98 F | SYSTOLIC BLOOD PRESSURE: 120 MMHG | DIASTOLIC BLOOD PRESSURE: 62 MMHG | OXYGEN SATURATION: 96 % | WEIGHT: 190 LBS | HEIGHT: 64 IN | HEART RATE: 62 BPM | BODY MASS INDEX: 32.44 KG/M2

## 2018-09-25 DIAGNOSIS — M79.89 SWELLING OF LEFT INDEX FINGER: Primary | ICD-10-CM

## 2018-09-25 DIAGNOSIS — M79.89 SWELLING OF LEFT INDEX FINGER: ICD-10-CM

## 2018-09-25 LAB
BASOPHILS # BLD AUTO: 0.04 X10(3) UL (ref 0–0.1)
BASOPHILS NFR BLD AUTO: 0.4 %
EOSINOPHIL # BLD AUTO: 0.12 X10(3) UL (ref 0–0.3)
EOSINOPHIL NFR BLD AUTO: 1.2 %
ERYTHROCYTE [DISTWIDTH] IN BLOOD BY AUTOMATED COUNT: 12.7 % (ref 11.5–16)
HCT VFR BLD AUTO: 34.1 % (ref 34–50)
HGB BLD-MCNC: 11.4 G/DL (ref 12–16)
IMMATURE GRANULOCYTE COUNT: 0.03 X10(3) UL (ref 0–1)
IMMATURE GRANULOCYTE RATIO %: 0.3 %
LYMPHOCYTES # BLD AUTO: 3.2 X10(3) UL (ref 0.9–4)
LYMPHOCYTES NFR BLD AUTO: 33.2 %
MCH RBC QN AUTO: 30.6 PG (ref 27–33.2)
MCHC RBC AUTO-ENTMCNC: 33.4 G/DL (ref 31–37)
MCV RBC AUTO: 91.7 FL (ref 81–100)
MONOCYTES # BLD AUTO: 1.34 X10(3) UL (ref 0.1–1)
MONOCYTES NFR BLD AUTO: 13.9 %
NEUTROPHIL ABS PRELIM: 4.92 X10 (3) UL (ref 1.3–6.7)
NEUTROPHILS # BLD AUTO: 4.92 X10(3) UL (ref 1.3–6.7)
NEUTROPHILS NFR BLD AUTO: 51 %
PLATELET # BLD AUTO: 194 10(3)UL (ref 150–450)
RBC # BLD AUTO: 3.72 X10(6)UL (ref 3.8–5.1)
RED CELL DISTRIBUTION WIDTH-SD: 42.5 FL (ref 35.1–46.3)
URATE SERPL-MCNC: 7.1 MG/DL (ref 2.4–8)
WBC # BLD AUTO: 9.7 X10(3) UL (ref 4–13)

## 2018-09-25 PROCEDURE — 84550 ASSAY OF BLOOD/URIC ACID: CPT

## 2018-09-25 PROCEDURE — 99213 OFFICE O/P EST LOW 20 MIN: CPT | Performed by: NURSE PRACTITIONER

## 2018-09-25 PROCEDURE — 36415 COLL VENOUS BLD VENIPUNCTURE: CPT

## 2018-09-25 PROCEDURE — 85025 COMPLETE CBC W/AUTO DIFF WBC: CPT

## 2018-09-25 RX ORDER — DOXYCYCLINE HYCLATE 100 MG/1
CAPSULE ORAL
Qty: 8 CAPSULE | Refills: 0 | Status: SHIPPED | OUTPATIENT
Start: 2018-09-25 | End: 2019-03-22 | Stop reason: ALTCHOICE

## 2018-09-25 RX ORDER — PREDNISONE 20 MG/1
40 TABLET ORAL DAILY
Qty: 10 TABLET | Refills: 0 | Status: SHIPPED | OUTPATIENT
Start: 2018-09-25 | End: 2018-09-28 | Stop reason: ALTCHOICE

## 2018-09-25 NOTE — PROGRESS NOTES
[facility]   PROGRESS NOTE  Chief Complaint:   Patient presents with:  Finger Pain: swelling, left pointer, started saturday       HPI:   This is a [de-identified]year old female coming in for Left index finger pain    Left index finger: Patient [de-identified] y female Basophil % 1.1 %    Immature Granulocyte % 0.2 %       Past Medical History:  No date: Arthritis      Comment:  psoriatic  9/2/15: [facility]      Comment:  TURBT - Dr. Danilo Romano  No date: Hypothyroid  No date: Incontinence of urine  No date:  Other a RETROGRADE PYELOGRAM;                 Surgeon: Hillary Martinez MD;  Location: [address] [place] [address],[address]  12/28/2015: MITOMYCIN 5 MG INJ; N/A      Comment:  Procedure: CYSTOSCOPY TRANSURETHRAL RESECTION OF BLADDER               TUMOR W/ Aby James daily. Disp: 90 tablet Rfl: 4   folic acid 1 MG Oral Tab Take 1 tablet (1 mg total) by mouth daily. Disp: 30 tablet Rfl: 11   RaNITidine HCl 300 MG Oral Tab Take 1 tablet (300 mg total) by mouth once daily.  Disp: 90 tablet Rfl: 3   TraMADol HCl 50 MG Oral Diagnoses and all orders for this visit:    Swelling of left index finger  -     US DIGIT (FINGER/TOE) LEFT LIMITED (LQL=[id number]); Future  -     CBC WITH DIFFERENTIAL WITH PLATELET;  Future  -     URIC ACID, SERUM; Future  -     XR FINGER(S) (MIN 2 VIEWS),

## 2018-09-26 ENCOUNTER — TELEPHONE (OUTPATIENT)
Dept: FAMILY MEDICINE CLINIC | Facility: CLINIC | Age: 83
End: 2018-09-26

## 2018-09-26 NOTE — TELEPHONE ENCOUNTER
----- Message from JUNAID Rogers sent at 9/26/2018  8:16 AM CDT -----  Uric acid high normal - could be gouty arthritis -continue with treatment and f/u on Friday for evaluation , hemoglobin slightly low -could be expected after recent surgery

## 2018-09-27 ENCOUNTER — HOSPITAL ENCOUNTER (OUTPATIENT)
Dept: GENERAL RADIOLOGY | Age: 83
Discharge: HOME | End: 2018-09-27
Attending: NURSE PRACTITIONER
Payer: MEDICARE

## 2018-09-27 PROCEDURE — 73140 X-RAY EXAM OF FINGER(S): CPT | Performed by: NURSE PRACTITIONER

## 2018-09-28 ENCOUNTER — OFFICE VISIT (OUTPATIENT)
Dept: FAMILY MEDICINE CLINIC | Facility: CLINIC | Age: 83
End: 2018-09-28
Payer: COMMERCIAL

## 2018-09-28 VITALS
HEART RATE: 69 BPM | HEIGHT: 64 IN | DIASTOLIC BLOOD PRESSURE: 62 MMHG | BODY MASS INDEX: 33 KG/M2 | SYSTOLIC BLOOD PRESSURE: 120 MMHG | RESPIRATION RATE: 16 BRPM | OXYGEN SATURATION: 98 % | TEMPERATURE: 98 F

## 2018-09-28 DIAGNOSIS — M79.89 SWELLING OF LEFT INDEX FINGER: Primary | ICD-10-CM

## 2018-09-28 DIAGNOSIS — Z23 ENCOUNTER FOR ADMINISTRATION OF VACCINE: ICD-10-CM

## 2018-09-28 PROCEDURE — G0008 ADMIN INFLUENZA VIRUS VAC: HCPCS | Performed by: NURSE PRACTITIONER

## 2018-09-28 PROCEDURE — 90653 IIV ADJUVANT VACCINE IM: CPT | Performed by: NURSE PRACTITIONER

## 2018-09-28 PROCEDURE — 99213 OFFICE O/P EST LOW 20 MIN: CPT | Performed by: NURSE PRACTITIONER

## 2018-09-28 RX ORDER — METHYLPREDNISOLONE 4 MG/1
TABLET ORAL
Qty: 1 KIT | Refills: 0 | Status: SHIPPED | OUTPATIENT
Start: 2018-09-28 | End: 2018-10-26

## 2018-09-28 NOTE — PROGRESS NOTES
Patient presents with: Follow - Up: finger    HPI:     Avinash Carpenter is a [de-identified]year old female who presents today with  follow up  of  Left index finger pain.     Patient [de-identified] y female is here to follow up on Left index finger from 9/21/18 with partial resoluti History    Tobacco Use      Smoking status: Former Smoker      Smokeless tobacco: Never Used    Alcohol use: Yes      Comment: social    Drug use: No         REVIEW OF SYSTEMS:     Positive for  Left index finger pain and swelling   Other systems are as no

## 2018-10-26 ENCOUNTER — LAB ENCOUNTER (OUTPATIENT)
Dept: LAB | Age: 83
End: 2018-10-26
Attending: INTERNAL MEDICINE
Payer: MEDICARE

## 2018-10-26 DIAGNOSIS — L40.50 PSORIATIC ARTHRITIS (HCC): ICD-10-CM

## 2018-10-26 DIAGNOSIS — Z79.899 ENCOUNTER FOR DRUG THERAPY: ICD-10-CM

## 2018-10-26 PROCEDURE — 82565 ASSAY OF CREATININE: CPT

## 2018-10-26 PROCEDURE — 36415 COLL VENOUS BLD VENIPUNCTURE: CPT

## 2018-10-26 PROCEDURE — 80076 HEPATIC FUNCTION PANEL: CPT

## 2018-10-26 PROCEDURE — 87340 HEPATITIS B SURFACE AG IA: CPT

## 2018-10-26 PROCEDURE — 86706 HEP B SURFACE ANTIBODY: CPT

## 2018-10-26 PROCEDURE — 84520 ASSAY OF UREA NITROGEN: CPT

## 2018-10-26 PROCEDURE — 85025 COMPLETE CBC W/AUTO DIFF WBC: CPT

## 2018-10-26 PROCEDURE — 86480 TB TEST CELL IMMUN MEASURE: CPT

## 2018-10-26 PROCEDURE — 85652 RBC SED RATE AUTOMATED: CPT

## 2018-11-12 DIAGNOSIS — I10 ESSENTIAL HYPERTENSION, BENIGN: ICD-10-CM

## 2018-11-12 RX ORDER — VALSARTAN AND HYDROCHLOROTHIAZIDE 160; 25 MG/1; MG/1
TABLET ORAL
Qty: 90 TABLET | Refills: 0 | Status: SHIPPED | OUTPATIENT
Start: 2018-11-12 | End: 2019-02-14

## 2018-11-12 NOTE — TELEPHONE ENCOUNTER
Medication(s) to Refill:   Requested Prescriptions     Pending Prescriptions Disp Refills   • VALSARTAN-HYDROCHLOROTHIAZIDE 160-25 MG Oral Tab [Pharmacy Med Name: VALSARTAN/HCTZ 160MG/25MG TABLETS] 90 tablet 0     Sig: TAKE 1 TABLET BY MOUTH EVERY DAY

## 2018-12-13 RX ORDER — SIMVASTATIN 20 MG
TABLET ORAL
Qty: 90 TABLET | Refills: 0 | Status: SHIPPED | OUTPATIENT
Start: 2018-12-13 | End: 2019-03-16

## 2018-12-13 RX ORDER — FLUOXETINE HYDROCHLORIDE 20 MG/1
CAPSULE ORAL
Qty: 90 CAPSULE | Refills: 0 | Status: SHIPPED | OUTPATIENT
Start: 2018-12-13 | End: 2019-03-11

## 2018-12-13 NOTE — TELEPHONE ENCOUNTER
Medication(s) to Refill:   Requested Prescriptions     Pending Prescriptions Disp Refills   • SIMVASTATIN 20 MG Oral Tab [Pharmacy Med Name: SIMVASTATIN 20MG TABLETS] 90 tablet 0     Sig: TAKE 1 TABLET BY MOUTH EVERY EVENING   • FLUOXETINE HCL 20 MG Oral C

## 2019-01-08 PROCEDURE — 81015 MICROSCOPIC EXAM OF URINE: CPT | Performed by: UROLOGY

## 2019-01-08 PROCEDURE — 87086 URINE CULTURE/COLONY COUNT: CPT | Performed by: UROLOGY

## 2019-01-11 RX ORDER — LEVOTHYROXINE SODIUM 50 MCG
TABLET ORAL
Qty: 90 TABLET | Refills: 0 | Status: SHIPPED | OUTPATIENT
Start: 2019-01-11 | End: 2019-04-10

## 2019-02-14 DIAGNOSIS — I10 ESSENTIAL HYPERTENSION, BENIGN: ICD-10-CM

## 2019-02-15 RX ORDER — VALSARTAN AND HYDROCHLOROTHIAZIDE 160; 25 MG/1; MG/1
TABLET ORAL
Qty: 90 TABLET | Refills: 0 | Status: SHIPPED | OUTPATIENT
Start: 2019-02-15 | End: 2019-06-14

## 2019-03-11 RX ORDER — FLUOXETINE HYDROCHLORIDE 20 MG/1
CAPSULE ORAL
Qty: 90 CAPSULE | Refills: 0 | Status: SHIPPED | OUTPATIENT
Start: 2019-03-11 | End: 2019-06-14

## 2019-03-11 NOTE — TELEPHONE ENCOUNTER
Medication(s) to Refill:   Requested Prescriptions     Pending Prescriptions Disp Refills   • FLUOXETINE HCL 20 MG Oral Cap [Pharmacy Med Name: FLUOXETINE 20MG CAPSULES] 90 capsule 0     Sig: TAKE 1 CAPSULE BY MOUTH DAILY         Reason for Medication Refi

## 2019-03-18 RX ORDER — SIMVASTATIN 20 MG
TABLET ORAL
Qty: 90 TABLET | Refills: 0 | Status: SHIPPED | OUTPATIENT
Start: 2019-03-18 | End: 2019-07-01

## 2019-03-18 NOTE — TELEPHONE ENCOUNTER
Medication(s) to Refill:   Requested Prescriptions     Pending Prescriptions Disp Refills   • SIMVASTATIN 20 MG Oral Tab [Pharmacy Med Name: SIMVASTATIN 20MG TABLETS] 90 tablet 0     Sig: TAKE 1 TABLET BY MOUTH EVERY EVENING           Last Time Medication

## 2019-03-22 ENCOUNTER — OFFICE VISIT (OUTPATIENT)
Dept: FAMILY MEDICINE CLINIC | Facility: CLINIC | Age: 84
End: 2019-03-22
Payer: COMMERCIAL

## 2019-03-22 VITALS
WEIGHT: 188 LBS | RESPIRATION RATE: 22 BRPM | SYSTOLIC BLOOD PRESSURE: 128 MMHG | HEIGHT: 64 IN | BODY MASS INDEX: 32.1 KG/M2 | OXYGEN SATURATION: 98 % | TEMPERATURE: 99 F | HEART RATE: 86 BPM | DIASTOLIC BLOOD PRESSURE: 80 MMHG

## 2019-03-22 DIAGNOSIS — M47.816 LUMBAR ARTHROPATHY: ICD-10-CM

## 2019-03-22 DIAGNOSIS — K21.9 GASTROESOPHAGEAL REFLUX DISEASE WITHOUT ESOPHAGITIS: ICD-10-CM

## 2019-03-22 DIAGNOSIS — C67.9 MALIGNANT NEOPLASM OF URINARY BLADDER, UNSPECIFIED SITE (HCC): ICD-10-CM

## 2019-03-22 DIAGNOSIS — M47.812 FACET ARTHRITIS OF CERVICAL REGION: ICD-10-CM

## 2019-03-22 DIAGNOSIS — J30.1 SEASONAL ALLERGIC RHINITIS DUE TO POLLEN: ICD-10-CM

## 2019-03-22 DIAGNOSIS — L40.50 PSORIATIC ARTHRITIS (HCC): Primary | ICD-10-CM

## 2019-03-22 DIAGNOSIS — I10 ESSENTIAL HYPERTENSION, BENIGN: ICD-10-CM

## 2019-03-22 DIAGNOSIS — L40.9 PSORIASIS: ICD-10-CM

## 2019-03-22 DIAGNOSIS — J41.0 SIMPLE CHRONIC BRONCHITIS (HCC): ICD-10-CM

## 2019-03-22 DIAGNOSIS — E78.00 PURE HYPERCHOLESTEROLEMIA: ICD-10-CM

## 2019-03-22 DIAGNOSIS — E03.9 ACQUIRED HYPOTHYROIDISM: ICD-10-CM

## 2019-03-22 DIAGNOSIS — M11.261 PSEUDOGOUT OF RIGHT KNEE: ICD-10-CM

## 2019-03-22 DIAGNOSIS — F33.8 SEASONAL DEPRESSION (HCC): ICD-10-CM

## 2019-03-22 DIAGNOSIS — M17.11 PRIMARY OSTEOARTHRITIS OF RIGHT KNEE: ICD-10-CM

## 2019-03-22 DIAGNOSIS — N18.30 CKD (CHRONIC KIDNEY DISEASE) STAGE 3, GFR 30-59 ML/MIN (HCC): ICD-10-CM

## 2019-03-22 PROCEDURE — G0439 PPPS, SUBSEQ VISIT: HCPCS | Performed by: FAMILY MEDICINE

## 2019-03-22 PROCEDURE — 96160 PT-FOCUSED HLTH RISK ASSMT: CPT | Performed by: FAMILY MEDICINE

## 2019-03-22 PROCEDURE — 99397 PER PM REEVAL EST PAT 65+ YR: CPT | Performed by: FAMILY MEDICINE

## 2019-03-22 RX ORDER — RANITIDINE 300 MG/1
300 TABLET ORAL
Qty: 90 TABLET | Refills: 4 | Status: SHIPPED | OUTPATIENT
Start: 2019-03-22 | End: 2019-08-26

## 2019-03-22 RX ORDER — METHYLPREDNISOLONE 4 MG/1
TABLET ORAL
Qty: 1 KIT | Refills: 0 | Status: SHIPPED | OUTPATIENT
Start: 2019-03-22 | End: 2019-08-26

## 2019-03-22 NOTE — PATIENT INSTRUCTIONS
Fátima Otero D.O.       [address], [address] [facility]   Phone: [phone]   Fax: [phone]         [address] .  [facility] [address] [place] [address], [address] [place] [address]   Phone: [phone]   Fax: [phone]         Dr. Oumar Camarena MD ?     Fay Herring

## 2019-03-22 NOTE — PROGRESS NOTES
REASON FOR VISIT:    Claribel Avila is a 82year old female who presents for a MA Supervisit.     HPI:     Patient Care Team: Patient Care Team:  Fatemeh Oviedo MD as PCP - General (Family Practice)  Long sEquivel MD as Consulting Physician (UROLOGY)  So (1 mg total) by mouth daily.  Disp: 30 tablet Rfl: 11       Glucose and HbA1c Latest Ref Rng & Units 6/25/2018 4/2/2018 4/5/2017 4/15/2016 12/1/2015 8/21/2015 8/16/2015   Glucose 65 - 99 mg/dL - - - - - - -   Glucose 70 - 99 mg/dL 82 86 96 93 130(H) 101(H) such as Influenza, Hepatitis B, Tetanus, or Pneumococcal?: No     Functional Ability     Bathing or Showering: Able without help  Toileting: Able without help  Dressing: Able without help  Eating: Able without help  Driving: Able without help  Preparing yo 04/15/2016 5.8 (H)       Fasting Blood Sugar (FSB)Annually Glucose (mg/dL)   Date Value   06/25/2018 82   12/01/2015 130 (H)     GLUCOSE (mg/dL)   Date Value   04/17/2014 115 (H)   07/22/2011 109 (H)      Cardiovascular Disease Screening    LDL Annually CYSTOSCOPY TRANSURETHRAL RESECTION OF BLADDER TUMOR W/ MITOMYCIN N/A 12/28/2015    Performed by Long Esquivel MD at [address] TUMOR W/ MITOMYCIN Right 9/2/2015    Performed by Kenny Paulino Smoking status: Former Smoker      Smokeless tobacco: Never Used    Alcohol use: Yes      Comment: social    Drug use: No       REVIEW OF SYSTEMS:   GENERAL: feels well otherwise  SKIN: denies any unusual skin lesions  EYES: denies blurred vision or doubl control.     Malignant neoplasm of urinary bladder, unspecified site [facility]): sees specialist in remission    Psoriasis: under good control    Pure hypercholesterolemia  Essential hypertension, benign:     low sugar, low salt and  lipid diet, exercise 3 times

## 2019-03-24 ENCOUNTER — MED REC SCAN ONLY (OUTPATIENT)
Dept: FAMILY MEDICINE CLINIC | Facility: CLINIC | Age: 84
End: 2019-03-24

## 2019-04-11 RX ORDER — LEVOTHYROXINE SODIUM 50 MCG
TABLET ORAL
Qty: 90 TABLET | Refills: 0 | Status: SHIPPED | OUTPATIENT
Start: 2019-04-11 | End: 2019-12-11

## 2019-04-11 NOTE — TELEPHONE ENCOUNTER
Medication(s) to Refill:   Requested Prescriptions     Pending Prescriptions Disp Refills   • SYNTHROID 48 MCG Oral Tab [Pharmacy Med Name: SYNTHROID 0.05MG (50MCG)TABLETS] 90 tablet 0     Sig: TAKE 1 TABLET BY MOUTH EVERY DAY         Reason for Medication

## 2019-05-20 ENCOUNTER — TELEPHONE (OUTPATIENT)
Dept: FAMILY MEDICINE CLINIC | Facility: CLINIC | Age: 84
End: 2019-05-20

## 2019-05-20 RX ORDER — RANITIDINE 300 MG/1
TABLET ORAL
Qty: 90 TABLET | Refills: 1 | Status: SHIPPED | OUTPATIENT
Start: 2019-05-20 | End: 2020-01-15

## 2019-05-20 RX ORDER — FOLIC ACID 1 MG/1
TABLET ORAL
Qty: 30 TABLET | Refills: 0 | Status: SHIPPED | OUTPATIENT
Start: 2019-05-20 | End: 2019-05-20

## 2019-05-20 RX ORDER — FOLIC ACID 1 MG/1
TABLET ORAL
Qty: 30 TABLET | Refills: 0 | OUTPATIENT
Start: 2019-05-20

## 2019-05-20 RX ORDER — RANITIDINE 300 MG/1
TABLET ORAL
Qty: 90 TABLET | Refills: 0 | Status: SHIPPED | OUTPATIENT
Start: 2019-05-20 | End: 2019-05-20

## 2019-05-20 RX ORDER — FOLIC ACID 1 MG/1
TABLET ORAL
Qty: 90 TABLET | Refills: 1 | Status: SHIPPED | OUTPATIENT
Start: 2019-05-20 | End: 2019-11-14

## 2019-05-20 NOTE — TELEPHONE ENCOUNTER
Medication(s) to Refill:   Requested Prescriptions     Pending Prescriptions Disp Refills   • FOLIC ACID 1 MG Oral Tab [Pharmacy Med Name: FOLIC ACID 1MG TABLETS] 30 tablet 0     Sig: TAKE 1 TABLET(1 MG) BY MOUTH DAILY   • RANITIDINE  MG Oral Tab [P

## 2019-05-20 NOTE — TELEPHONE ENCOUNTER
Medication(s) to Refill:   Requested Prescriptions     Pending Prescriptions Disp Refills   • FOLIC ACID 1 MG Oral Tab [Pharmacy Med Name: FOLIC ACID 1MG TABLETS] 30 tablet 0     Sig: TAKE 1 TABLET(1 MG) BY MOUTH DAILY         Reason for Medication Refill

## 2019-06-01 DIAGNOSIS — M17.11 PRIMARY OSTEOARTHRITIS OF RIGHT KNEE: ICD-10-CM

## 2019-06-03 RX ORDER — METOPROLOL SUCCINATE 50 MG/1
TABLET, EXTENDED RELEASE ORAL
Qty: 90 TABLET | Refills: 0 | Status: SHIPPED | OUTPATIENT
Start: 2019-06-03 | End: 2019-09-30

## 2019-06-03 NOTE — TELEPHONE ENCOUNTER
Medication(s) to Refill:   Requested Prescriptions     Pending Prescriptions Disp Refills   • METOPROLOL SUCCINATE ER 50 MG Oral Tablet 24 Hr [Pharmacy Med Name: METOPROLOL ER SUCCINATE 50MG TABS] 90 tablet 0     Sig: TAKE 1 TABLET(50 MG) BY MOUTH EVERY DA

## 2019-06-14 DIAGNOSIS — I10 ESSENTIAL HYPERTENSION, BENIGN: ICD-10-CM

## 2019-06-14 RX ORDER — VALSARTAN AND HYDROCHLOROTHIAZIDE 160; 25 MG/1; MG/1
TABLET ORAL
Qty: 90 TABLET | Refills: 0 | Status: SHIPPED | OUTPATIENT
Start: 2019-06-14 | End: 2019-09-30

## 2019-06-14 RX ORDER — FLUOXETINE HYDROCHLORIDE 20 MG/1
CAPSULE ORAL
Qty: 90 CAPSULE | Refills: 0 | Status: SHIPPED | OUTPATIENT
Start: 2019-06-14 | End: 2019-09-14

## 2019-06-20 ENCOUNTER — LAB ENCOUNTER (OUTPATIENT)
Dept: LAB | Age: 84
End: 2019-06-20
Attending: FAMILY MEDICINE
Payer: MEDICARE

## 2019-06-20 ENCOUNTER — TELEPHONE (OUTPATIENT)
Dept: FAMILY MEDICINE CLINIC | Facility: CLINIC | Age: 84
End: 2019-06-20

## 2019-06-20 DIAGNOSIS — N18.30 CKD (CHRONIC KIDNEY DISEASE) STAGE 3, GFR 30-59 ML/MIN (HCC): Chronic | ICD-10-CM

## 2019-06-20 DIAGNOSIS — Z13.21 SCREENING FOR ENDOCRINE, NUTRITIONAL, METABOLIC AND IMMUNITY DISORDER: ICD-10-CM

## 2019-06-20 DIAGNOSIS — Z13.0 SCREENING FOR ENDOCRINE, NUTRITIONAL, METABOLIC AND IMMUNITY DISORDER: ICD-10-CM

## 2019-06-20 DIAGNOSIS — Z13.228 SCREENING FOR ENDOCRINE, NUTRITIONAL, METABOLIC AND IMMUNITY DISORDER: ICD-10-CM

## 2019-06-20 DIAGNOSIS — E03.9 ACQUIRED HYPOTHYROIDISM: ICD-10-CM

## 2019-06-20 DIAGNOSIS — E78.00 PURE HYPERCHOLESTEROLEMIA: Primary | ICD-10-CM

## 2019-06-20 DIAGNOSIS — I10 ESSENTIAL HYPERTENSION, BENIGN: ICD-10-CM

## 2019-06-20 DIAGNOSIS — Z13.29 SCREENING FOR ENDOCRINE, NUTRITIONAL, METABOLIC AND IMMUNITY DISORDER: ICD-10-CM

## 2019-06-20 DIAGNOSIS — E78.00 PURE HYPERCHOLESTEROLEMIA: ICD-10-CM

## 2019-06-20 PROCEDURE — 36415 COLL VENOUS BLD VENIPUNCTURE: CPT

## 2019-06-20 PROCEDURE — 80053 COMPREHEN METABOLIC PANEL: CPT

## 2019-06-20 PROCEDURE — 85025 COMPLETE CBC W/AUTO DIFF WBC: CPT

## 2019-06-20 PROCEDURE — 84443 ASSAY THYROID STIM HORMONE: CPT

## 2019-06-20 PROCEDURE — 80061 LIPID PANEL: CPT

## 2019-06-21 ENCOUNTER — TELEPHONE (OUTPATIENT)
Dept: FAMILY MEDICINE CLINIC | Facility: CLINIC | Age: 84
End: 2019-06-21

## 2019-06-21 NOTE — TELEPHONE ENCOUNTER
----- Message from Delonte Vaughan MD sent at 6/20/2019  3:58 PM CDT -----  Overall stable, better kidney function.

## 2019-07-01 RX ORDER — SIMVASTATIN 20 MG
TABLET ORAL
Qty: 90 TABLET | Refills: 0 | Status: SHIPPED | OUTPATIENT
Start: 2019-07-01 | End: 2019-09-30

## 2019-07-01 NOTE — TELEPHONE ENCOUNTER
Medication(s) to Refill:   Requested Prescriptions     Pending Prescriptions Disp Refills   • SIMVASTATIN 20 MG Oral Tab [Pharmacy Med Name: SIMVASTATIN 20MG TABLETS] 90 tablet 0     Sig: TAKE 1 TABLET BY MOUTH EVERY EVENING             Last Time Medicatio

## 2019-07-11 RX ORDER — LEVOTHYROXINE SODIUM 50 MCG
TABLET ORAL
Qty: 90 TABLET | Refills: 0 | Status: SHIPPED | OUTPATIENT
Start: 2019-07-11 | End: 2019-08-26

## 2019-08-22 ENCOUNTER — MED REC SCAN ONLY (OUTPATIENT)
Dept: FAMILY MEDICINE CLINIC | Facility: CLINIC | Age: 84
End: 2019-08-22

## 2019-08-26 ENCOUNTER — HOSPITAL ENCOUNTER (OUTPATIENT)
Dept: GENERAL RADIOLOGY | Age: 84
Discharge: HOME | End: 2019-08-26
Attending: NURSE PRACTITIONER
Payer: MEDICARE

## 2019-08-26 ENCOUNTER — OFFICE VISIT (OUTPATIENT)
Dept: FAMILY MEDICINE CLINIC | Facility: CLINIC | Age: 84
End: 2019-08-26
Payer: COMMERCIAL

## 2019-08-26 ENCOUNTER — HOSPITAL ENCOUNTER (OUTPATIENT)
Dept: CT IMAGING | Age: 84
Discharge: HOME | End: 2019-08-26
Attending: NURSE PRACTITIONER
Payer: MEDICARE

## 2019-08-26 ENCOUNTER — TELEPHONE (OUTPATIENT)
Dept: FAMILY MEDICINE CLINIC | Facility: CLINIC | Age: 84
End: 2019-08-26

## 2019-08-26 ENCOUNTER — APPOINTMENT (OUTPATIENT)
Dept: LAB | Age: 84
End: 2019-08-26
Attending: NURSE PRACTITIONER
Payer: MEDICARE

## 2019-08-26 ENCOUNTER — HOSPITAL ENCOUNTER (OUTPATIENT)
Facility: HOSPITAL | Age: 84
Setting detail: OBSERVATION
LOS: 2 days | Discharge: HOME | End: 2019-08-28
Attending: EMERGENCY MEDICINE | Admitting: INTERNAL MEDICINE
Payer: MEDICARE

## 2019-08-26 ENCOUNTER — HOSPITAL ENCOUNTER (OUTPATIENT)
Dept: ULTRASOUND IMAGING | Age: 84
Discharge: HOME | End: 2019-08-26
Attending: NURSE PRACTITIONER
Payer: MEDICARE

## 2019-08-26 VITALS
BODY MASS INDEX: 33.46 KG/M2 | RESPIRATION RATE: 16 BRPM | WEIGHT: 196 LBS | SYSTOLIC BLOOD PRESSURE: 120 MMHG | DIASTOLIC BLOOD PRESSURE: 70 MMHG | HEART RATE: 92 BPM | OXYGEN SATURATION: 98 % | HEIGHT: 64 IN

## 2019-08-26 DIAGNOSIS — R60.9 EDEMA, UNSPECIFIED TYPE: ICD-10-CM

## 2019-08-26 DIAGNOSIS — M79.89 FOOT SWELLING: ICD-10-CM

## 2019-08-26 DIAGNOSIS — M79.89 PAIN AND SWELLING OF LEFT LOWER LEG: Primary | ICD-10-CM

## 2019-08-26 DIAGNOSIS — R06.02 SHORTNESS OF BREATH: ICD-10-CM

## 2019-08-26 DIAGNOSIS — R82.71 BACTERIURIA: ICD-10-CM

## 2019-08-26 DIAGNOSIS — M25.472 LEFT ANKLE SWELLING: ICD-10-CM

## 2019-08-26 DIAGNOSIS — M79.662 PAIN AND SWELLING OF LEFT LOWER LEG: Primary | ICD-10-CM

## 2019-08-26 DIAGNOSIS — I26.99 OTHER ACUTE PULMONARY EMBOLISM WITHOUT ACUTE COR PULMONALE (HCC): Primary | ICD-10-CM

## 2019-08-26 DIAGNOSIS — M79.89 PAIN AND SWELLING OF LEFT LOWER LEG: ICD-10-CM

## 2019-08-26 DIAGNOSIS — M79.662 PAIN AND SWELLING OF LEFT LOWER LEG: ICD-10-CM

## 2019-08-26 DIAGNOSIS — I82.4Z2 ACUTE DEEP VEIN THROMBOSIS (DVT) OF DISTAL VEIN OF LEFT LOWER EXTREMITY (HCC): ICD-10-CM

## 2019-08-26 LAB
ALBUMIN SERPL-MCNC: 3.6 G/DL (ref 3.4–5)
ALBUMIN/GLOB SERPL: 1.1 {RATIO} (ref 1–2)
ALP LIVER SERPL-CCNC: 52 U/L (ref 55–142)
ALT SERPL-CCNC: 23 U/L (ref 13–56)
ANION GAP SERPL CALC-SCNC: 9 MMOL/L (ref 0–18)
APPEARANCE: CLEAR
APTT PPP: 38 SECONDS (ref 25.4–36.1)
AST SERPL-CCNC: 17 U/L (ref 15–37)
BASOPHILS # BLD AUTO: 0.03 X10(3) UL (ref 0–0.2)
BASOPHILS NFR BLD AUTO: 0.3 %
BILIRUB SERPL-MCNC: 0.9 MG/DL (ref 0.1–2)
BILIRUBIN: NEGATIVE
BUN BLD-MCNC: 17 MG/DL (ref 7–18)
BUN/CREAT SERPL: 17.2 (ref 10–20)
CALCIUM BLD-MCNC: 9 MG/DL (ref 8.5–10.1)
CHLORIDE SERPL-SCNC: 103 MMOL/L (ref 98–112)
CO2 SERPL-SCNC: 25 MMOL/L (ref 21–32)
CREAT BLD-MCNC: 0.9 MG/DL (ref 0.55–1.02)
CREAT BLD-MCNC: 0.99 MG/DL (ref 0.55–1.02)
D-DIMER: 4.35 UG/ML FEU (ref ?–0.81)
DEPRECATED RDW RBC AUTO: 53.4 FL (ref 35.1–46.3)
EOSINOPHIL # BLD AUTO: 0.08 X10(3) UL (ref 0–0.7)
EOSINOPHIL NFR BLD AUTO: 0.8 %
ERYTHROCYTE [DISTWIDTH] IN BLOOD BY AUTOMATED COUNT: 14.7 % (ref 11–15)
GLOBULIN PLAS-MCNC: 3.3 G/DL (ref 2.8–4.4)
GLUCOSE (URINE DIPSTICK): NEGATIVE MG/DL
GLUCOSE BLD-MCNC: 91 MG/DL (ref 70–99)
HCT VFR BLD AUTO: 33.3 % (ref 35–48)
HGB BLD-MCNC: 11.1 G/DL (ref 12–16)
IMM GRANULOCYTES # BLD AUTO: 0.07 X10(3) UL (ref 0–1)
IMM GRANULOCYTES NFR BLD: 0.7 %
INR BLD: 1.79 (ref 0.9–1.1)
IRON SATURATION: 31 % (ref 15–50)
IRON SERPL-MCNC: 116 UG/DL (ref 50–170)
KETONES (URINE DIPSTICK): NEGATIVE MG/DL
LYMPHOCYTES # BLD AUTO: 2.79 X10(3) UL (ref 1–4)
LYMPHOCYTES NFR BLD AUTO: 29.4 %
M PROTEIN MFR SERPL ELPH: 6.9 G/DL (ref 6.4–8.2)
MCH RBC QN AUTO: 33.4 PG (ref 26–34)
MCHC RBC AUTO-ENTMCNC: 33.3 G/DL (ref 31–37)
MCV RBC AUTO: 100.3 FL (ref 80–100)
MONOCYTES # BLD AUTO: 1.15 X10(3) UL (ref 0.1–1)
MONOCYTES NFR BLD AUTO: 12.1 %
MULTISTIX LOT#: NORMAL NUMERIC
NEUTROPHILS # BLD AUTO: 5.38 X10 (3) UL (ref 1.5–7.7)
NEUTROPHILS # BLD AUTO: 5.38 X10(3) UL (ref 1.5–7.7)
NEUTROPHILS NFR BLD AUTO: 56.7 %
NITRITE, URINE: NEGATIVE
NT-PROBNP SERPL-MCNC: 648 PG/ML (ref ?–450)
OCCULT BLOOD: NEGATIVE
OSMOLALITY SERPL CALC.SUM OF ELEC: 285 MOSM/KG (ref 275–295)
PH, URINE: 5.5 (ref 4.5–8)
PLATELET # BLD AUTO: 225 10(3)UL (ref 150–450)
POTASSIUM SERPL-SCNC: 3.7 MMOL/L (ref 3.5–5.1)
PROTEIN (URINE DIPSTICK): NEGATIVE MG/DL
PSA SERPL DL<=0.01 NG/ML-MCNC: 21.4 SECONDS (ref 12.2–14.4)
RBC # BLD AUTO: 3.32 X10(6)UL (ref 3.8–5.3)
SODIUM SERPL-SCNC: 137 MMOL/L (ref 136–145)
SPECIFIC GRAVITY: 1.01 (ref 1–1.03)
TOTAL IRON BINDING CAPACITY: 371 UG/DL (ref 240–450)
TRANSFERRIN SERPL-MCNC: 249 MG/DL (ref 200–360)
TROPONIN I SERPL-MCNC: <0.045 NG/ML (ref ?–0.04)
URATE SERPL-MCNC: 8.7 MG/DL (ref 2.6–6)
URINE-COLOR: YELLOW
UROBILINOGEN,SEMI-QN: 0.2 MG/DL (ref 0–1.9)
WBC # BLD AUTO: 9.5 X10(3) UL (ref 4–11)

## 2019-08-26 PROCEDURE — 73630 X-RAY EXAM OF FOOT: CPT | Performed by: NURSE PRACTITIONER

## 2019-08-26 PROCEDURE — 71046 X-RAY EXAM CHEST 2 VIEWS: CPT | Performed by: NURSE PRACTITIONER

## 2019-08-26 PROCEDURE — 73610 X-RAY EXAM OF ANKLE: CPT | Performed by: NURSE PRACTITIONER

## 2019-08-26 PROCEDURE — 81003 URINALYSIS AUTO W/O SCOPE: CPT | Performed by: NURSE PRACTITIONER

## 2019-08-26 PROCEDURE — 99214 OFFICE O/P EST MOD 30 MIN: CPT | Performed by: NURSE PRACTITIONER

## 2019-08-26 PROCEDURE — 71275 CT ANGIOGRAPHY CHEST: CPT | Performed by: NURSE PRACTITIONER

## 2019-08-26 PROCEDURE — 82565 ASSAY OF CREATININE: CPT

## 2019-08-26 PROCEDURE — 93971 EXTREMITY STUDY: CPT | Performed by: NURSE PRACTITIONER

## 2019-08-26 PROCEDURE — 99223 1ST HOSP IP/OBS HIGH 75: CPT | Performed by: HOSPITALIST

## 2019-08-26 RX ORDER — HEPARIN SODIUM AND DEXTROSE 10000; 5 [USP'U]/100ML; G/100ML
18 INJECTION INTRAVENOUS ONCE
Status: COMPLETED | OUTPATIENT
Start: 2019-08-26 | End: 2019-08-28

## 2019-08-26 RX ORDER — ACETAMINOPHEN 325 MG/1
650 TABLET ORAL EVERY 6 HOURS PRN
Status: DISCONTINUED | OUTPATIENT
Start: 2019-08-26 | End: 2019-08-28

## 2019-08-26 RX ORDER — METOCLOPRAMIDE HYDROCHLORIDE 5 MG/ML
5 INJECTION INTRAMUSCULAR; INTRAVENOUS EVERY 8 HOURS PRN
Status: DISCONTINUED | OUTPATIENT
Start: 2019-08-26 | End: 2019-08-28

## 2019-08-26 RX ORDER — TRAZODONE HYDROCHLORIDE 50 MG/1
50 TABLET ORAL NIGHTLY PRN
Status: DISCONTINUED | OUTPATIENT
Start: 2019-08-26 | End: 2019-08-28

## 2019-08-26 RX ORDER — ONDANSETRON 2 MG/ML
4 INJECTION INTRAMUSCULAR; INTRAVENOUS EVERY 6 HOURS PRN
Status: DISCONTINUED | OUTPATIENT
Start: 2019-08-26 | End: 2019-08-28

## 2019-08-26 RX ORDER — HEPARIN SODIUM AND DEXTROSE 10000; 5 [USP'U]/100ML; G/100ML
INJECTION INTRAVENOUS CONTINUOUS
Status: DISCONTINUED | OUTPATIENT
Start: 2019-08-27 | End: 2019-08-28

## 2019-08-26 RX ORDER — HEPARIN SODIUM 5000 [USP'U]/ML
80 INJECTION INTRAVENOUS; SUBCUTANEOUS ONCE
Status: COMPLETED | OUTPATIENT
Start: 2019-08-26 | End: 2019-08-26

## 2019-08-26 NOTE — PROGRESS NOTES
HPI:     Patient presents with: Other: left foot swelling, x 2 weeks   Skin edema. This is a swelling problem. The current episode started in the past  2 weeks  The problem has been  Worsening  since the onset.  The affected locations include  Bilat aspiration of knee joint   • CERVICAL EPIDURAL STEROID INJECTION N/A 5/30/2018    Performed by Sindy Santiago MD at [facility] MAIN OR   • COLONOSCOPY     • CYSTO, WITH INSERTION OF STENT Right 9/2/2015    Performed by Robert Collado MD at [address] [place] [address] Rest. No wheezing, + cough. LYMPH: no enlargement of the lymph nodes. MUSC/SKEL: bilateral ankle swelling,no no joint stiffness.   CARDIOVASCULAR: positive for  Chest thinness, no pain on exertion or rest.  GI: no nausea, no vomiting or abdominal pain  NE extremity (HCC)  -     rivaroxaban (XARELTO) 15 MG Oral Tab; Take 1 tablet (15 mg total) by mouth daily with food. -     Cancel: CT ANGIOGRAPHY, CHEST (CPT=71275); Future  -     CT CHEST (CPT=71250); Future  US reviewed : 1. No sonographic evidence for de

## 2019-08-26 NOTE — TELEPHONE ENCOUNTER
I called [facility] Medicare Advantage and spoke to MARY KAY Moncada. [employer] Medicare advantage no longer requires pre certs for CT scans as of 1/1/18. Ref # [id number].

## 2019-08-26 NOTE — PATIENT INSTRUCTIONS
Deep Vein Thrombosis (DVT)    Deep vein thrombosis (DVT) occurs when a blood clot (thrombus) forms in a deep vein. This happens most often in the leg. It can also happen in the arms or other parts of the body.  A part of the clot called an embolus can alyssia · When sitting or lying down, move your ankles, toes and knees often. This may also help improve blood flow in the legs. Follow-up care  Follow up with your healthcare provider, or as advised.  If imaging tests were done, they may need further review by

## 2019-08-27 ENCOUNTER — APPOINTMENT (OUTPATIENT)
Dept: CT IMAGING | Facility: HOSPITAL | Age: 84
End: 2019-08-27
Attending: HOSPITALIST
Payer: MEDICARE

## 2019-08-27 ENCOUNTER — APPOINTMENT (OUTPATIENT)
Dept: CV DIAGNOSTICS | Facility: HOSPITAL | Age: 84
End: 2019-08-27
Attending: HOSPITALIST
Payer: MEDICARE

## 2019-08-27 LAB
APTT PPP: 165.2 SECONDS (ref 25.4–36.1)
APTT PPP: 219.1 SECONDS (ref 25.4–36.1)
APTT PPP: 65.9 SECONDS (ref 25.4–36.1)
APTT PPP: >300 SECONDS (ref 25.4–36.1)
ATRIAL RATE: 64 BPM
BETA 2 GLYCOPROTEIN 1 AB, IGG: <3.7 U/ML (ref ?–15)
BETA 2 GLYCOPROTEIN 1 AB, IGM: <3.7 U/ML (ref ?–15)
DEPRECATED HBV CORE AB SER IA-ACNC: 210.8 NG/ML (ref 18–340)
DRVVT LUPUS ANTICOAGULANT: NEGATIVE
DRVVT SCREEN RATIO: 0.77 (ref 0–1.29)
HCT VFR BLD AUTO: 30.7 % (ref 35–48)
IRON SATURATION: 28 % (ref 15–50)
IRON SERPL-MCNC: 89 UG/DL (ref 50–170)
P AXIS: 59 DEGREES
P-R INTERVAL: 122 MS
PHOSPHOLIPID AB, IGG: NEGATIVE
PHOSPHOLIPID AB, IGM: NEGATIVE
POTASSIUM SERPL-SCNC: 4.2 MMOL/L (ref 3.5–5.1)
PT(LUPUS): 18.2 SECONDS (ref 12.5–14.7)
PTT (HEPZYME): 33.7 SECONDS (ref 25.4–36.1)
Q-T INTERVAL: 406 MS
QRS DURATION: 90 MS
QTC CALCULATION (BEZET): 418 MS
R AXIS: 38 DEGREES
STACLOT LA DELTA: 6.5 SECONDS (ref ?–8)
STACLOT LA: NEGATIVE
T AXIS: 47 DEGREES
TOTAL IRON BINDING CAPACITY: 317 UG/DL (ref 240–450)
TRANSFERRIN SERPL-MCNC: 213 MG/DL (ref 200–360)
VENTRICULAR RATE: 64 BPM
VIT B12 SERPL-MCNC: 361 PG/ML (ref 193–986)

## 2019-08-27 PROCEDURE — 93306 TTE W/DOPPLER COMPLETE: CPT | Performed by: HOSPITALIST

## 2019-08-27 PROCEDURE — 99223 1ST HOSP IP/OBS HIGH 75: CPT | Performed by: SPECIALIST

## 2019-08-27 PROCEDURE — 74177 CT ABD & PELVIS W/CONTRAST: CPT | Performed by: HOSPITALIST

## 2019-08-27 RX ORDER — TRAMADOL HYDROCHLORIDE 50 MG/1
50 TABLET ORAL EVERY 6 HOURS PRN
Status: DISCONTINUED | OUTPATIENT
Start: 2019-08-27 | End: 2019-08-28

## 2019-08-27 RX ORDER — ENOXAPARIN SODIUM 100 MG/ML
90 INJECTION SUBCUTANEOUS 2 TIMES DAILY
Qty: 10 SYRINGE | Refills: 0 | Status: SHIPPED | OUTPATIENT
Start: 2019-08-27 | End: 2019-08-27

## 2019-08-27 RX ORDER — METOPROLOL SUCCINATE 50 MG/1
50 TABLET, EXTENDED RELEASE ORAL
Status: DISCONTINUED | OUTPATIENT
Start: 2019-08-27 | End: 2019-08-28

## 2019-08-27 RX ORDER — FAMOTIDINE 20 MG/1
20 TABLET ORAL DAILY
Status: DISCONTINUED | OUTPATIENT
Start: 2019-08-27 | End: 2019-08-28

## 2019-08-27 RX ORDER — VALSARTAN AND HYDROCHLOROTHIAZIDE 160; 25 MG/1; MG/1
1 TABLET ORAL
Status: DISCONTINUED | OUTPATIENT
Start: 2019-08-27 | End: 2019-08-27 | Stop reason: RX

## 2019-08-27 RX ORDER — FLUOXETINE HYDROCHLORIDE 20 MG/1
20 CAPSULE ORAL
Status: DISCONTINUED | OUTPATIENT
Start: 2019-08-27 | End: 2019-08-28

## 2019-08-27 RX ORDER — POTASSIUM CHLORIDE 20 MEQ/1
40 TABLET, EXTENDED RELEASE ORAL ONCE
Status: COMPLETED | OUTPATIENT
Start: 2019-08-27 | End: 2019-08-27

## 2019-08-27 RX ORDER — LEVOTHYROXINE SODIUM 0.05 MG/1
50 TABLET ORAL
Status: DISCONTINUED | OUTPATIENT
Start: 2019-08-27 | End: 2019-08-28

## 2019-08-27 RX ORDER — ATORVASTATIN CALCIUM 10 MG/1
10 TABLET, FILM COATED ORAL NIGHTLY
Status: DISCONTINUED | OUTPATIENT
Start: 2019-08-27 | End: 2019-08-28

## 2019-08-27 NOTE — PROGRESS NOTES
Pharmacy Note: Renal dose adjustment for Metoclopramide (Reglan)  Paula Rea has been prescribed Metoclopramide (Reglan) 10 mg every 8 hours as needed. Estimated Creatinine Clearance: 38.5 mL/min (based on SCr of 0.99 mg/dL).     Her calculated creatin

## 2019-08-27 NOTE — PROGRESS NOTES
[facility]  Progress Note     [place] Patient Status:  Inpatient    1935 MRN [id number]   [facility] 7NE-A Attending Dianh Heard MD   Hosp Day # 1 PCP Cleo Arora MD     Chief Complaint: LE edema    S: Patient with Imaging data reviewed in Epic.     Medications:   • FLUoxetine HCl  20 mg Oral Daily   • Metoprolol Succinate ER  50 mg Oral Daily Beta Blocker   • famoTIDine  20 mg Oral Daily   • atorvastatin  10 mg Oral Nightly   • Levothyroxine Sodium  50 mcg Oral QAM A

## 2019-08-27 NOTE — PROGRESS NOTES
[facility] Pharmacy Note: Renal dose adjustment for Famotidine (Pepcid)  Fatemeh Flores has been prescribed Famotidine (Pepcid) 40 mg every 24 hours. Estimated Creatinine Clearance: 38.5 mL/min (based on SCr of 0.99 mg/dL).     Her calculated creatinine cl

## 2019-08-27 NOTE — ED INITIAL ASSESSMENT (HPI)
Pt sent from CT scan after having a chest CT positive for bial PE. Pt states she's had swelling to her left lower leg for 2 wks. C/o exertional SOB fro 3 days. No pain.

## 2019-08-27 NOTE — PROGRESS NOTES
Patient states she was on an airplane for 4 hours in July. Patient rememders experiencing a few days later swelling and tightness in her left leg, but no pain. Patient states swelling has progressively worse since.  Patient states has history of swelling

## 2019-08-27 NOTE — CERTIFICATION
**Certification      PHYSICIAN Certification of Need for Inpatient Hospitalization - Initial Certification    Patient will require inpatient services that will reasonably be expected to span two midnight's based on the clinical documentation in H+P.    Base OOB in chair with no complaints  Patient with no anginal chest pain or shortness of breath      MEDICATIONS  (STANDING):  argatroban Infusion 2 MICROgram(s)/kG/Min (7.536 mL/Hr) IV Continuous <Continuous>  aspirin enteric coated 81 milliGRAM(s) Oral daily  atorvastatin 40 milliGRAM(s) Oral at bedtime  docusate sodium 100 milliGRAM(s) Oral three times a day  furosemide   Injectable 40 milliGRAM(s) IV Push every 12 hours  metoprolol succinate  milliGRAM(s) Oral every 12 hours  pantoprazole  Injectable 40 milliGRAM(s) IV Push daily  sodium chloride 0.9%. 1000 milliLiter(s) (10 mL/Hr) IV Continuous <Continuous>    MEDICATIONS  (PRN):  benzocaine 15 mG/menthol 3.6 mG (Sugar-Free) Lozenge 1 Lozenge Oral every 4 hours PRN Sore Throat      LABS:                        12.2   3.8   )-----------( 118      ( 29 Nov 2018 10:50 )             36.4     Hemoglobin: 12.2 g/dL (11-29 @ 10:50)  Hemoglobin: 11.6 g/dL (11-28 @ 10:27)  Hemoglobin: 12.2 g/dL (11-27 @ 10:18)  Hemoglobin: 12.2 g/dL (11-26 @ 01:29)  Hemoglobin: 13.2 g/dL (11-25 @ 09:50)    11-29    136  |  99  |  42<H>  ----------------------------<  116<H>  4.2   |  23  |  1.57<H>    Ca    9.6      29 Nov 2018 10:50      Creatinine Trend: 1.57<--, 1.47<--, 1.41<--, 1.36<--, 1.42<--, 1.37<--   PT/INR - ( 29 Nov 2018 10:50 )   PT: 25.7 sec;   INR: 2.20 ratio         PTT - ( 29 Nov 2018 10:50 )  PTT:54.5 sec        PHYSICAL EXAM  Vital Signs Last 24 Hrs  T(C): 36.8 (29 Nov 2018 13:58), Max: 36.8 (29 Nov 2018 13:58)  T(F): 98.3 (29 Nov 2018 13:58), Max: 98.3 (29 Nov 2018 13:58)  HR: 78 (29 Nov 2018 13:58) (78 - 102)  BP: 101/64 (29 Nov 2018 13:58) (101/64 - 116/79)  BP(mean): --  RR: 16 (29 Nov 2018 13:58) (16 - 18)  SpO2: 98% (29 Nov 2018 13:58) (97% - 100%)      HEENT: Normal Oral mucosa, PERRL, EOMI  Lymphatic: No obvious lymphadenopathy, No edema  Cardiovascular: Normal S1S2, IRRR, 1/6 CARL, Peripheral pulses palpable 2+ B/L  Respiratory: Lungs clear to auscultation, normal effort  Gastrointestinal: Abdomen soft, ND, NT, +BS  Skin: Warm, dry, intact. No cyanosis, No rash.  Musculoskeletal: Normal ROM, normal strength  Psychiatric: Appropriate Mood and Affect    DIAGNOSTIC DATA  TELEMETRY: AF   up to 130 o/n    < from: YENNY w/Doppler (09.24.18 @ 13:09) >  CONCLUSIONS:  1. Mild posterior mitral annular calcification. Severe,  predominantly centrally-directed mitral regurgitation with  multiple regurgitant jets. PISA could not be performed for  quantification of MR due to multiple regurgitant jets.  2. Mildly calcified trileaflet aortic valve with decreased  opening. Mild aortic stenosis. Trace aortic regurgitation.  Linear strands on the left ventricular side of the aortic  leaflets consistent with Lambl's excresences.  3. Normal aortic root. Simple atheroma noted in aortic  arch/descending aorta.  4. No left atrial or left atrial appendagethrombus.  5. Mild to moderate global left ventricular systolic  dysfunction.  6. No clot seen in right atrium.  7. Normal right ventricular size and function.  8. Normal tricuspid valve. Trace tricuspid regurgitation.  9. Normal pulmonic valve. Trace pulmonic insufficiency is  noted.  10. Atrial septum appears intact on 2D echo and color  Doppler.  11. No pericardial effusion.    *** Compared with echocardiogram of 9/22/2018, a YENNY was  performed and additional information provided.  < end of copied text >     < from: Intra-Operative Transesophageal Echo (11.21.18 @ 13:40) >  Conclusions:  1. Mitral annular calcification. Tethered mitral valve  leaflets with normal opening. Abnormal posterior mitral  valve leaflet, possible cleft leaflet. Severe mitral  regurgitation. Large lateral jet with other small jets  along the commissural line. Mean transmitral valve gradient  equals 1 mm Hg. HR 83bpm.  2. Normal right atrium. Large approx 4-5cm long, highly  mobile thrombus is seen swirling around in the right  atrium.  3. Normal right ventricular size with decreased right  ventricular systolic function.  Confirmedon  11/21/2018 - 14:37:52 by Manuel De León M.D.        < from: Transthoracic Echocardiogram (11.22.18 @ 09:42) >  Conclusions:  1. Mitral annular calcification,tethered mitral valve  leaflets. Severe mitral regurgitation.  2. Mildly dilated left atrium.  LA volume index = 38 cc/m2.  3. Low normal left ventricular systolic function. No  segmental wall motion abnormalities.  4. A linear structure compatible with a Chiari-network is  identified in the right atrium (normal variant). No  thrombus seen.  5. Normal right ventricular size and function.  6. Normal tricuspid valve. Moderate tricuspid  regurgitation.  7. Estimated pulmonary artery systolic pressure equals 35  mm Hg, assuming right atrial pressure equals 8 mm Hg,  consistent with borderline pulmonary pressures.  8. Left pleural effusion.    < end of copied text >          ASSESSMENT AND PLAN:     He is a pleasant 89 y/o male PMH persistent AF and severe MR who was scheduled to have a Lucy Clip, but the procedure was cancelled because he had a large RA thrombus on intra-op YENNY, which was then aspirated. He denies palpitations, syncope, nor angina.    -management of severe MR as per CT surgery  -anticoagulation as per vascular cardiology - on Argatroban given thrombocytopenia -however AVIVA negative        - follow up heme  -recommend lifelong ac for cva prevention   -continue with rate control with beta blockers  -repeat TTE PRN to assess for thrombus per CTS  - care per CTS  - f/u with Dr Valentine for cardiology upon DC

## 2019-08-27 NOTE — CONSULTS
THE [facility] Report of Consultation      Patient Name: Ida Short   YOB: 1935  Medical Record Number: [id number]  Consulting Physician: Mj Rich. Tato Her M.D.    Referring Physician: Dre Eisenberg MD    Date of Consultati High blood pressure    • High cholesterol    • Hypothyroid    • Incontinence of urine    • Osteoarthritis    • Other and unspecified hyperlipidemia    • Unspecified essential hypertension          Past Surgical History   Past Surgical History:   Procedure Former User        Quit date: 7/31/2005    Alcohol use:  Yes      Alcohol/week: 2.0 standard drinks      Types: 2 Glasses of wine per week      Comment: daily    Drug use: No          Current Medications   FLUoxetine HCl (PROZAC) cap 20 mg 20 mg Oral Daily orthopnea. Gastrointestinal No melena, hematochezia. Genitourinary No hematuria. Musculoskeletal As per HPI. Integumentary No acute or chronic rashes.   Neurologic No headache, blurred vision, areas of focal weakness or numbness, peripheral neuropathy, 3.80 - 5.30 x10(6)uL    HGB 11.1 (L) 12.0 - 16.0 g/dL    HCT 33.3 (L) 35.0 - 48.0 %    .0 150.0 - 450.0 10(3)uL    .3 (H) 80.0 - 100.0 fL    MCH 33.4 26.0 - 34.0 pg    MCHC 33.3 31.0 - 37.0 g/dL    RDW 14.7 11.0 - 15.0 %    RDW-SD 53.4 (H) 35 122 ms    QRS Duration 90 ms    Q-T Interval 406 ms    QTC Calculation (Bezet) 418 ms    P Axis 59 degrees    R Axis 38 degrees    T Axis 47 degrees   COMP METABOLIC PANEL (14)    Collection Time: 08/26/19  8:17 PM   Result Value Ref Range    Glucose 91 70 COMPARISON:  None.      INDICATIONS:  R06.02 Shortness of breath I82.4Z2 Acute embolism and thrombosis of unspecified deep veins of left distal lower extremity     TECHNIQUE:  IV contrast-enhanced multislice CT angiography is performed through the pulmona leg     TECHNIQUE:  Real time, grey scale, and duplex ultrasound was used to evaluate the lower extremity venous system. B-mode two-dimensional images of the vascular structures, Doppler spectral analysis, and color flow. Doppler imaging were performed. she is elderly, I would check anti-Xa levels. At the same time, warfarin should be started with goal INR 2-3. Patient is comfortable administering injections at home. If appropriate, she can be switched to [address] in the future.      2.   Macrocytic anemia: Anny Morrow

## 2019-08-27 NOTE — H&P
[facility]  History and Physical     [place] [address] Patient Status:  Inpatient    1935 MRN [id number]   [facility] 7NE-A Attending Ej King MD   Hosp Day # 0 PCP Cleo Arora MD     Chief Complaint: leg swelling    Hi OOPHORECTOMY  1/1/1974   • OTHER  1/1/99    gastrotomy with suture of bleeding ulcer   • OTHER SURGICAL HISTORY  12/1/15    [facility] Cysto - Dr. Louann Hatchet   • OTHER SURGICAL HISTORY  4/19/16    [facility] Cysto- Dr. Louann Hatchet   • OTHER SURGICAL HISTORY  7/12/16    [facility] Cysto- BY MOUTH EVERY DAY Disp: 90 tablet Rfl: 0   methotrexate 2.5 MG Oral Tab Take 8 tablets po once weekly Disp: 96 tablet Rfl: 3   ketoconazole 2 % External Cream Apply 1 Application topically 2 (two) times daily.  Disp: 60 g Rfl: 0   Clindamycin Phosphate 1 % mg/dL). Recent Labs   Lab 08/26/19 2017   PTP 21.4*   INR 1.79*       Recent Labs   Lab 08/26/19 2017   TROP <0.045       Imaging: Imaging data reviewed in Epic. ASSESSMENT / PLAN:     1.  Acute PE and DVT; heparin drip; oncology consult; check deja

## 2019-08-27 NOTE — ED PROVIDER NOTES
Patient Seen in: [address] [facility] In [place]    History   Patient presents with:  Pulmonary Embolism (PE): Confirmed via CT scan    Stated Complaint:     HPI    The patient is an 82-year-old female sent to the emergency department from [facility] HYSTERECTOMY  1/1/74   • OOPHORECTOMY  1/1/1974   • OTHER  1/1/99    gastrotomy with suture of bleeding ulcer   • OTHER SURGICAL HISTORY  12/1/15    [facility] Cysto - Dr. Marco Rangel   • OTHER SURGICAL HISTORY  4/19/16    [facility] Cysto- Dr. Marco Rangel   • [address] [place] [id number] or masses. Nontender throughout abdomen to superficial and deep palpation throughout all 4 quadrants, epigastrium and suprapubic regions. No CVA tenderness.   Extremities: No deformity, nontender throughout, and normal active range of motion of all 4 extrem immediately following interpretation at 7:34 p.m. on 8/26/2019 as a critical value, a read back was performed. Patient has bilateral pulmonary emboli-she is hemodynamically stable. Heparin was initiated.   She will be admitted for anticoagulation, a

## 2019-08-27 NOTE — PLAN OF CARE
NURSING ADMISSION NOTE      Patient admitted via ambulance from [facility] for DVT and PE  Heparin drip infusing at 16cc/hr  SOB on exertion. Chronic back pain and LLQ pain. Tylenol given  PCP saw patient. K+ replaced.   Plan:CT abd/Echo  [employer]

## 2019-08-28 ENCOUNTER — MOBILE ENCOUNTER (OUTPATIENT)
Dept: HEMATOLOGY/ONCOLOGY | Facility: HOSPITAL | Age: 84
End: 2019-08-28

## 2019-08-28 VITALS
WEIGHT: 196 LBS | OXYGEN SATURATION: 99 % | SYSTOLIC BLOOD PRESSURE: 125 MMHG | BODY MASS INDEX: 34 KG/M2 | RESPIRATION RATE: 16 BRPM | HEART RATE: 87 BPM | TEMPERATURE: 98 F | DIASTOLIC BLOOD PRESSURE: 47 MMHG

## 2019-08-28 LAB — APTT PPP: 82.5 SECONDS (ref 25.4–36.1)

## 2019-08-28 PROCEDURE — 99231 SBSQ HOSP IP/OBS SF/LOW 25: CPT | Performed by: SPECIALIST

## 2019-08-28 PROCEDURE — 99239 HOSP IP/OBS DSCHRG MGMT >30: CPT | Performed by: HOSPITALIST

## 2019-08-28 RX ORDER — COLCHICINE 0.6 MG/1
TABLET ORAL
COMMUNITY

## 2019-08-28 NOTE — PLAN OF CARE
Assumed care of patient at 0700. Pt A/O x4. VSS. SPO2 maintained on RA , Pt experiences SOB with exertion. Up w/ SB. Heparin gtt infusing per protocol. Last PTT therapeutic, next PTT scheduled tomorrow 5:11am  LLE swollen, larger than RLE.    Patient precautions as indicated by assessment.  - Educate pt/family on patient safety including physical limitations  - Instruct pt to call for assistance with activity based on assessment  - Modify environment to reduce risk of injury  - Provide assistive device Manage/alleviate anxiety  - Monitor for signs/symptoms of CO2 retention  Outcome: Progressing     Problem: METABOLIC/FLUID AND ELECTROLYTES - ADULT  Goal: Electrolytes maintained within normal limits  Description  INTERVENTIONS:  - Monitor labs and rhythm

## 2019-08-28 NOTE — PAYOR COMM NOTE
[facility]  OBS    PLACE IN OBSERVATION (Order #[id number]) on 8/28/19  --------------  ADMISSION REVIEW     Payor: [address] [place] [address] #:  [id number]  Authorization Number: [id number]

## 2019-08-28 NOTE — PROGRESS NOTES
[facility]  Progress Note     Yolie Chester Patient Status:  Inpatient    1935 MRN [id number]   [facility] 7NE-A Attending Maylin Caldwell MD   Hosp Day # 2 PCP Matt Green MD     Chief Complaint: LE edema    S: Patient with 08/26/19 2017   TROP <0.045            Imaging: Imaging data reviewed in Epic.     Medications:   • apixaban  10 mg Oral BID   • FLUoxetine HCl  20 mg Oral Daily   • Metoprolol Succinate ER  50 mg Oral Daily Beta Blocker   • famoTIDine  20 mg Oral Daily

## 2019-08-28 NOTE — CM/SW NOTE
Tara Linares, 08/28/19, 2:24 PM  Patient failed inpatient criteria. Second level of review completed and supports observation. UR committee in agreement. Discussed with Dr. Duane Masters who approves observation status.  Observation letter given to the patient

## 2019-08-28 NOTE — PLAN OF CARE
Assumed patient care 0700 per day shift. Patient alert/orientated x4. VSS during shift. Oxygen maintained on RA, some SOB with exertion. Heparin Drip discontinued per order, Eliquis PO started. Patient ambulated hallway x3, tolerated well.  C/o lower left b increased pain with activity and pre-medicate as appropriate  Outcome: Adequate for Discharge     Problem: SAFETY ADULT - FALL  Goal: Free from fall injury  Description  INTERVENTIONS:  - Assess pt frequently for physical needs  - Identify cognitive and ph on oxygen saturation or ABGs  - Provide Smoking Cessation handout, if applicable  - Encourage broncho-pulmonary hygiene including cough, deep breathe, Incentive Spirometry  - Assess the need for suctioning and perform as needed  - Assess and instruct to re

## 2019-08-28 NOTE — PLAN OF CARE
Assumed patient care at 1900  Patient alert and oriented X 4, patient c/o lower bacl pain with relief from tylenol and cold pack  Patient up with SB to bathroom  Heparin gtt infusing at 11 mL/Hr, next PTT in morning  Plan for possible discharge to home in transportation as appropriate  - Identify discharge learning needs (meds, wound care, etc)  - Arrange for interpreters to assist at discharge as needed  - Consider post-discharge preferences of patient/family/discharge partner  - Complete POLST form as marielle

## 2019-08-29 ENCOUNTER — PATIENT OUTREACH (OUTPATIENT)
Dept: CASE MANAGEMENT | Age: 84
End: 2019-08-29

## 2019-08-29 ENCOUNTER — TELEPHONE (OUTPATIENT)
Dept: FAMILY MEDICINE CLINIC | Facility: CLINIC | Age: 84
End: 2019-08-29

## 2019-08-29 DIAGNOSIS — I82.492 ACUTE DEEP VEIN THROMBOSIS (DVT) OF OTHER SPECIFIED VEIN OF LEFT LOWER EXTREMITY (HCC): ICD-10-CM

## 2019-08-29 DIAGNOSIS — Z02.9 ENCOUNTERS FOR UNSPECIFIED ADMINISTRATIVE PURPOSE: ICD-10-CM

## 2019-08-29 LAB
HEMATOCRIT (CLIENT SUPPLIED): 30.7 %
MMA: 0.4 UMOL/L

## 2019-08-29 PROCEDURE — 1111F DSCHRG MED/CURRENT MED MERGE: CPT

## 2019-08-29 NOTE — PROGRESS NOTES
Initial Post Discharge Follow Up   Discharge Date: 8/28/19  Contact Date: 8/29/2019    Consent Verification:  Assessment Completed With: Patient  HIPAA Verified?   Yes    Discharge Dx:  Acute DVT/PE    Was TCC ordered: yes    General:    • How have you b TABLET BY MOUTH EVERY DAY Disp: 90 tablet Rfl: 0   METOPROLOL SUCCINATE ER 50 MG Oral Tablet 24 Hr TAKE 1 TABLET(50 MG) BY MOUTH EVERY DAY Disp: 90 tablet Rfl: 0   folic acid 1 MG Oral Tab TAKE 1 TABLET(1 MG) BY MOUTH DAILY Disp: 90 tablet Rfl: 1   raNITId benefits of TCC appt. Pt verbalized understanding and still refuses to schedule TCC appt. Confirmed PCP and hematology appts as scheduled below.     Your appointments     Date & Time Appointment Department [facility])    Sep 03, 2019  9:30 AM CDT Follow up w concerns. BOOK BY DATE: 9/11/19    [x]  Discharge Summary, Discharge medications reviewed/discussed/and reconciled against outpatient medications with patient,  and orders reviewed and discussed.  Any changes or updates to medications and or orders sen

## 2019-08-29 NOTE — TELEPHONE ENCOUNTER
----- Message from Guille Santoyo MD sent at 8/29/2019 12:14 PM CDT -----  Ask pt how is she feeling, if on Coumadin, our hematology needs to f/u this, Coumadin clinic, ask if she has f/u with one of them, thanks.

## 2019-08-29 NOTE — DISCHARGE SUMMARY
[facility]YCHIATRIC [place] HOSPITALIST  DISCHARGE SUMMARY     Lynn Daniels Patient Status:  Observation    1935 MRN [id number]   [facility] 7NE-A Attending No att. providers found   [facility] Day # 2 PCP Sho Kwan MD     Date of Admission: 2019  Da thereafter (approx. 9 AM)      Take 2 tabs (10mg) by mouth twice daily for 7 days, then take 1 tab (5mg) by mouth twice daily thereafter.    Quantity:  74 tablet  Refills:  0        CHANGE how you take these medications      Instructions Prescription detail Refills:  0     Valsartan-hydroCHLOROthiazide 160-25 MG Tabs      TAKE 1 TABLET BY MOUTH EVERY DAY   Quantity:  90 tablet  Refills:  0        STOP taking these medications    rivaroxaban 15 MG Tabs  Commonly known as:  Garrett Breaux              Where to Get You extremities. Extremities: No edema.   -----------------------------------------------------------------------------------------------  PATIENT DISCHARGE INSTRUCTIONS: See electronic chart    Damaris Joe MD    Time spent:  > 30 minutes

## 2019-08-30 ENCOUNTER — HOSPITAL ENCOUNTER (EMERGENCY)
Age: 84
Discharge: HOME | End: 2019-08-30
Attending: EMERGENCY MEDICINE
Payer: MEDICARE

## 2019-08-30 ENCOUNTER — APPOINTMENT (OUTPATIENT)
Dept: CT IMAGING | Age: 84
End: 2019-08-30
Attending: EMERGENCY MEDICINE
Payer: MEDICARE

## 2019-08-30 VITALS
OXYGEN SATURATION: 100 % | HEART RATE: 68 BPM | TEMPERATURE: 98 F | BODY MASS INDEX: 33 KG/M2 | SYSTOLIC BLOOD PRESSURE: 110 MMHG | WEIGHT: 194 LBS | DIASTOLIC BLOOD PRESSURE: 43 MMHG | RESPIRATION RATE: 20 BRPM

## 2019-08-30 DIAGNOSIS — I26.99 ACUTE PULMONARY EMBOLISM WITHOUT ACUTE COR PULMONALE, UNSPECIFIED PULMONARY EMBOLISM TYPE (HCC): Primary | ICD-10-CM

## 2019-08-30 LAB
ALBUMIN SERPL-MCNC: 3.3 G/DL (ref 3.4–5)
ALBUMIN/GLOB SERPL: 1 {RATIO} (ref 1–2)
ALP LIVER SERPL-CCNC: 42 U/L (ref 55–142)
ALT SERPL-CCNC: 24 U/L (ref 13–56)
ANION GAP SERPL CALC-SCNC: 8 MMOL/L (ref 0–18)
APTT PPP: 37.3 SECONDS (ref 25.4–36.1)
AST SERPL-CCNC: 18 U/L (ref 15–37)
ATRIAL RATE: 66 BPM
BASOPHILS # BLD AUTO: 0.04 X10(3) UL (ref 0–0.2)
BASOPHILS NFR BLD AUTO: 0.6 %
BILIRUB SERPL-MCNC: 0.5 MG/DL (ref 0.1–2)
BUN BLD-MCNC: 21 MG/DL (ref 7–18)
BUN/CREAT SERPL: 14.1 (ref 10–20)
CALCIUM BLD-MCNC: 8.5 MG/DL (ref 8.5–10.1)
CHLORIDE SERPL-SCNC: 106 MMOL/L (ref 98–112)
CO2 SERPL-SCNC: 26 MMOL/L (ref 21–32)
CREAT BLD-MCNC: 1.49 MG/DL (ref 0.55–1.02)
DEPRECATED RDW RBC AUTO: 54.2 FL (ref 35.1–46.3)
EOSINOPHIL # BLD AUTO: 0.11 X10(3) UL (ref 0–0.7)
EOSINOPHIL NFR BLD AUTO: 1.6 %
ERYTHROCYTE [DISTWIDTH] IN BLOOD BY AUTOMATED COUNT: 14.6 % (ref 11–15)
GLOBULIN PLAS-MCNC: 3.2 G/DL (ref 2.8–4.4)
GLUCOSE BLD-MCNC: 119 MG/DL (ref 70–99)
HCT VFR BLD AUTO: 31.6 % (ref 35–48)
HGB BLD-MCNC: 10.5 G/DL (ref 12–16)
IMM GRANULOCYTES # BLD AUTO: 0.05 X10(3) UL (ref 0–1)
IMM GRANULOCYTES NFR BLD: 0.7 %
INR BLD: 1.49 (ref 0.9–1.1)
LYMPHOCYTES # BLD AUTO: 1.88 X10(3) UL (ref 1–4)
LYMPHOCYTES NFR BLD AUTO: 27.5 %
M PROTEIN MFR SERPL ELPH: 6.5 G/DL (ref 6.4–8.2)
MCH RBC QN AUTO: 33.7 PG (ref 26–34)
MCHC RBC AUTO-ENTMCNC: 33.2 G/DL (ref 31–37)
MCV RBC AUTO: 101.3 FL (ref 80–100)
MONOCYTES # BLD AUTO: 0.87 X10(3) UL (ref 0.1–1)
MONOCYTES NFR BLD AUTO: 12.7 %
NEUTROPHILS # BLD AUTO: 3.89 X10 (3) UL (ref 1.5–7.7)
NEUTROPHILS # BLD AUTO: 3.89 X10(3) UL (ref 1.5–7.7)
NEUTROPHILS NFR BLD AUTO: 56.9 %
NT-PROBNP SERPL-MCNC: 377 PG/ML (ref ?–450)
OSMOLALITY SERPL CALC.SUM OF ELEC: 294 MOSM/KG (ref 275–295)
P AXIS: 52 DEGREES
P-R INTERVAL: 138 MS
PLATELET # BLD AUTO: 195 10(3)UL (ref 150–450)
POTASSIUM SERPL-SCNC: 4 MMOL/L (ref 3.5–5.1)
PSA SERPL DL<=0.01 NG/ML-MCNC: 18.4 SECONDS (ref 12.2–14.4)
Q-T INTERVAL: 398 MS
QRS DURATION: 78 MS
QTC CALCULATION (BEZET): 417 MS
R AXIS: 40 DEGREES
RBC # BLD AUTO: 3.12 X10(6)UL (ref 3.8–5.3)
SODIUM SERPL-SCNC: 140 MMOL/L (ref 136–145)
T AXIS: 45 DEGREES
TROPONIN I SERPL-MCNC: <0.045 NG/ML (ref ?–0.04)
VENTRICULAR RATE: 66 BPM
WBC # BLD AUTO: 6.8 X10(3) UL (ref 4–11)

## 2019-08-30 PROCEDURE — 71275 CT ANGIOGRAPHY CHEST: CPT | Performed by: EMERGENCY MEDICINE

## 2019-08-30 PROCEDURE — 85730 THROMBOPLASTIN TIME PARTIAL: CPT | Performed by: EMERGENCY MEDICINE

## 2019-08-30 PROCEDURE — 93005 ELECTROCARDIOGRAM TRACING: CPT

## 2019-08-30 PROCEDURE — 84484 ASSAY OF TROPONIN QUANT: CPT | Performed by: EMERGENCY MEDICINE

## 2019-08-30 PROCEDURE — 80053 COMPREHEN METABOLIC PANEL: CPT | Performed by: EMERGENCY MEDICINE

## 2019-08-30 PROCEDURE — 99285 EMERGENCY DEPT VISIT HI MDM: CPT

## 2019-08-30 PROCEDURE — 96360 HYDRATION IV INFUSION INIT: CPT

## 2019-08-30 PROCEDURE — 83880 ASSAY OF NATRIURETIC PEPTIDE: CPT | Performed by: EMERGENCY MEDICINE

## 2019-08-30 PROCEDURE — 85025 COMPLETE CBC W/AUTO DIFF WBC: CPT | Performed by: EMERGENCY MEDICINE

## 2019-08-30 PROCEDURE — 85610 PROTHROMBIN TIME: CPT | Performed by: EMERGENCY MEDICINE

## 2019-08-30 PROCEDURE — 93010 ELECTROCARDIOGRAM REPORT: CPT

## 2019-08-30 RX ORDER — SODIUM CHLORIDE 9 MG/ML
125 INJECTION, SOLUTION INTRAVENOUS CONTINUOUS
Status: DISCONTINUED | OUTPATIENT
Start: 2019-08-30 | End: 2019-08-30

## 2019-08-30 NOTE — TELEPHONE ENCOUNTER
I spoke with patient and advised her to proceed to to the emergency room for these symptoms. She verbalized understanding and is agreeable.

## 2019-08-30 NOTE — ED PROVIDER NOTES
Patient Seen in: THE [facility] Emergency Department In [place]    History   Patient presents with:  Dyspnea SHARLENE SOB (respiratory)    Stated Complaint: [address]  SENT OVER BY HER PRIMARY FOR PE.    HPI    Patient is an 82-year-old female with a his UNILATERAL RETROGRADE PYELOGRAM Right 9/2/2015    Performed by Clark Logan MD at [facility]   • HYSTERECTOMY  1/1/74   • OOPHORECTOMY  1/1/1974   • OTHER  1/1/99    gastrotomy with suture of bleeding ulcer   • OTHER SURGICAL HISTORY  12/1 normal.   Musculoskeletal: Normal range of motion. Neurological: She is alert and oriented to person, place, and time. Skin: Skin is warm and dry. Psychiatric: She has a normal mood and affect. Nursing note and vitals reviewed.           ED Course 8/26/2019 at 16:23     Approved by: Kerline Lopes MD            Xr Ankle (min 3 Views), Left (cpt=73610)    Result Date: 8/26/2019  CONCLUSION:  Mild degenerative changes of the left ankle without acute bony injury.    Dictated by: Kerline Lopes MD on 8/26/ (cpt=71275)(cs)    Result Date: 8/26/2019  CONCLUSION:  1. Findings are consistent with bilateral pulmonary emboli.   Information was discussed with Michael Aguilar immediately following interpretation at 7:34 p.m. on 8/26/2019 as a critical value, a read corinne

## 2019-08-30 NOTE — TELEPHONE ENCOUNTER
I spoke with patient. She was discharged on Eliquis and has follow up with hemonc on 9/18. She has follow up with Abdi Whiting on 9/3.  Patient reports that since being home she feels her breathing has become more labored but is only exertional. Denies SOB during

## 2019-09-03 ENCOUNTER — OFFICE VISIT (OUTPATIENT)
Dept: FAMILY MEDICINE CLINIC | Facility: CLINIC | Age: 84
End: 2019-09-03
Payer: COMMERCIAL

## 2019-09-03 VITALS
HEART RATE: 88 BPM | RESPIRATION RATE: 16 BRPM | TEMPERATURE: 98 F | BODY MASS INDEX: 33.46 KG/M2 | OXYGEN SATURATION: 98 % | SYSTOLIC BLOOD PRESSURE: 110 MMHG | DIASTOLIC BLOOD PRESSURE: 60 MMHG | WEIGHT: 196 LBS | HEIGHT: 64 IN

## 2019-09-03 DIAGNOSIS — R21 RASH AND NONSPECIFIC SKIN ERUPTION: ICD-10-CM

## 2019-09-03 DIAGNOSIS — J30.9 ALLERGIC RHINITIS, UNSPECIFIED SEASONALITY, UNSPECIFIED TRIGGER: ICD-10-CM

## 2019-09-03 DIAGNOSIS — I82.4Z2 ACUTE DEEP VEIN THROMBOSIS (DVT) OF DISTAL VEIN OF LEFT LOWER EXTREMITY (HCC): Primary | ICD-10-CM

## 2019-09-03 DIAGNOSIS — I83.93 VARICOSE VEINS OF BOTH LOWER EXTREMITIES, UNSPECIFIED WHETHER COMPLICATED: ICD-10-CM

## 2019-09-03 PROCEDURE — 99214 OFFICE O/P EST MOD 30 MIN: CPT | Performed by: NURSE PRACTITIONER

## 2019-09-03 RX ORDER — NYSTATIN 100000 U/G
1 CREAM TOPICAL 2 TIMES DAILY
Qty: 30 G | Refills: 1 | Status: SHIPPED | OUTPATIENT
Start: 2019-09-03 | End: 2019-09-18 | Stop reason: ALTCHOICE

## 2019-09-03 RX ORDER — CETIRIZINE HYDROCHLORIDE 10 MG/1
10 TABLET, CHEWABLE ORAL DAILY
Qty: 30 TABLET | Refills: 3 | Status: SHIPPED | OUTPATIENT
Start: 2019-09-03 | End: 2019-10-03

## 2019-09-03 NOTE — PROGRESS NOTES
Patient presents with:  TCM (Transition Of Care Management)         HPI:         Patient is 82 female presents for hospital follow up. Admitted for acute DVT left leg and Bilateral PE. On 8/26/19  Discharged  on 8/28/19.  Seen in ER on 8/30/19 for frederick Gel Apply 1 Application topically nightly. (Patient taking differently: Apply 1 Application topically as needed.  ) Disp: 45 g Rfl: 3   TraMADol HCl 50 MG Oral Tab Take 50 mg by mouth every 8 (eight) hours as needed.    Disp:  Rfl: 0      Past Medical Histo neck surgery      Family History   Problem Relation Age of Onset   • Cancer Father         liver   • Diabetes Father    • Heart Disorder Mother    • Diabetes Mother    • Diabetes Sister    • Heart Disorder Sister    • Diabetes Brother       Social Hist deep vein thrombosis (DVT) of distal vein of left lower extremity (HCC)  -     Cancel: US VEIN MAP LOWER EXTREMITY BILAT (CPT=93970);  Future    Rash and nonspecific skin eruption  -     nystatin [id number] UNIT/GM External Cream; Apply 1 Application topically

## 2019-09-13 ENCOUNTER — TELEPHONE (OUTPATIENT)
Dept: FAMILY MEDICINE CLINIC | Facility: CLINIC | Age: 84
End: 2019-09-13

## 2019-09-13 NOTE — TELEPHONE ENCOUNTER
Pt is on Eliquis 5mg bid and has lost the samples the hospital gave her. Can you send an RX to her pharmacy?  (we have no samples)  Pt will f/up with Dr Ritchie Meza on 9/18/19.

## 2019-09-13 NOTE — TELEPHONE ENCOUNTER
Patient called and she said she can;t find her samples of Eliquis that she received them from hospital. Patient does not know what to do. Please call her at [phone].  Patient just got out of the hospital on 9/4/19 and she is missing week 3 and is not s

## 2019-09-15 RX ORDER — FLUOXETINE HYDROCHLORIDE 20 MG/1
CAPSULE ORAL
Qty: 90 CAPSULE | Refills: 0 | Status: SHIPPED | OUTPATIENT
Start: 2019-09-15 | End: 2019-12-26

## 2019-09-18 ENCOUNTER — OFFICE VISIT (OUTPATIENT)
Dept: HEMATOLOGY/ONCOLOGY | Age: 84
End: 2019-09-18
Attending: SPECIALIST
Payer: MEDICARE

## 2019-09-18 VITALS
WEIGHT: 197 LBS | TEMPERATURE: 99 F | SYSTOLIC BLOOD PRESSURE: 125 MMHG | OXYGEN SATURATION: 98 % | HEART RATE: 71 BPM | BODY MASS INDEX: 34 KG/M2 | DIASTOLIC BLOOD PRESSURE: 44 MMHG | RESPIRATION RATE: 20 BRPM

## 2019-09-18 DIAGNOSIS — I26.99 PULMONARY EMBOLISM, BILATERAL (HCC): Primary | ICD-10-CM

## 2019-09-18 DIAGNOSIS — I82.492 ACUTE DEEP VEIN THROMBOSIS (DVT) OF OTHER SPECIFIED VEIN OF LEFT LOWER EXTREMITY (HCC): ICD-10-CM

## 2019-09-18 DIAGNOSIS — I82.592 CHRONIC DEEP VEIN THROMBOSIS (DVT) OF OTHER VEIN OF LEFT LOWER EXTREMITY (HCC): ICD-10-CM

## 2019-09-18 PROCEDURE — 99214 OFFICE O/P EST MOD 30 MIN: CPT | Performed by: SPECIALIST

## 2019-09-18 NOTE — PROGRESS NOTES
[facility] Progress Note        Patient Name: Amy Friedman   YOB: 1935  Medical Record Number: [id number]  Attending Physician: Hugo Maxwell. Linda Morrison M.D.      Date of Visit: 9/18/2019       Chief Complaint  Left proximal gastrocnemius DV essential hypertension          Past Surgical History   Past Surgical History:   Procedure Laterality Date   • ANESTH,KNEE AREA SURGERY      arthrocentesis aspiration of knee joint   • CERVICAL EPIDURAL STEROID INJECTION N/A 5/30/2018    Performed by Moncho Marinelli, No          Current Medications   No current facility-administered medications on file as of 9/18/2019. Allergies   Ms. Nancy Martines is allergic to amoxil; augmentin, [amoxicillin-pot clavulanate]; cephalosporins; penicillins; and codeine.        Review of Syst chest and left lower extremity Doppler ultrasound at she near the end of three months of therapy. A d dimer will also be obtained. She will then return to make a final decision regarding therapy.      Planned Follow Up  Patient will return for follow up at

## 2019-09-18 NOTE — PROGRESS NOTES
Patient is here today for follow up with Sun Adame for DVT and PE. Patient denies pain today. Started Eliquis therapy on 8/26/2019. Medication list and medical history were reviewed and updated.      Education Record    Learner:  Patient     Disease / Lilia

## 2019-09-30 DIAGNOSIS — M17.11 PRIMARY OSTEOARTHRITIS OF RIGHT KNEE: ICD-10-CM

## 2019-09-30 DIAGNOSIS — I10 ESSENTIAL HYPERTENSION, BENIGN: ICD-10-CM

## 2019-09-30 RX ORDER — SIMVASTATIN 20 MG
TABLET ORAL
Qty: 90 TABLET | Refills: 0 | Status: SHIPPED | OUTPATIENT
Start: 2019-09-30 | End: 2019-12-30

## 2019-09-30 RX ORDER — METOPROLOL SUCCINATE 50 MG/1
TABLET, EXTENDED RELEASE ORAL
Qty: 90 TABLET | Refills: 0 | Status: SHIPPED | OUTPATIENT
Start: 2019-09-30 | End: 2020-01-07

## 2019-09-30 RX ORDER — LEVOTHYROXINE SODIUM 50 MCG
TABLET ORAL
Qty: 90 TABLET | Refills: 0 | Status: SHIPPED | OUTPATIENT
Start: 2019-09-30 | End: 2019-12-30

## 2019-09-30 RX ORDER — VALSARTAN AND HYDROCHLOROTHIAZIDE 160; 25 MG/1; MG/1
TABLET ORAL
Qty: 90 TABLET | Refills: 0 | Status: SHIPPED | OUTPATIENT
Start: 2019-09-30 | End: 2019-12-30

## 2019-09-30 NOTE — TELEPHONE ENCOUNTER
Medication(s) to Refill:   Requested Prescriptions     Pending Prescriptions Disp Refills   • SIMVASTATIN 20 MG Oral Tab [Pharmacy Med Name: SIMVASTATIN 20MG TABLETS] 90 tablet 0     Sig: TAKE 1 TABLET BY MOUTH EVERY EVENING   • VALSARTAN-HYDROCHLOROTHIAZI

## 2019-11-15 RX ORDER — FOLIC ACID 1 MG/1
TABLET ORAL
Qty: 90 TABLET | Refills: 0 | Status: SHIPPED | OUTPATIENT
Start: 2019-11-15 | End: 2020-04-24

## 2019-11-15 NOTE — TELEPHONE ENCOUNTER
Medication(s) to Refill:   Requested Prescriptions     Pending Prescriptions Disp Refills   • FOLIC ACID 1 MG Oral Tab [Pharmacy Med Name: FOLIC ACID 1MG TABLETS] 90 tablet 0     Sig: TAKE 1 TABLET(1 MG) BY MOUTH DAILY         Reason for Medication Refill

## 2019-11-27 ENCOUNTER — APPOINTMENT (OUTPATIENT)
Dept: HEMATOLOGY/ONCOLOGY | Age: 84
End: 2019-11-27
Attending: SPECIALIST
Payer: MEDICARE

## 2019-11-27 NOTE — PROGRESS NOTES
[facility] Progress Note        Patient Name: Bernardo Bueno   YOB: 1935  Medical Record Number: [id number]  Attending Physician: Fuad Porter. Ritchie Meza M.D.      Date of Visit: 9/18/2019       Chief Complaint  Left proximal gastrocnemius DV essential hypertension          Past Surgical History   Past Surgical History:   Procedure Laterality Date   • ANESTH,KNEE AREA SURGERY      arthrocentesis aspiration of knee joint   • CERVICAL EPIDURAL STEROID INJECTION N/A 5/30/2018    Performed by Tasia Degroot No          Current Medications No current facility-administered medications on file as of 11/27/2019. Allergies   Ms. Lupe Vigil is allergic to amoxil; augmentin, [amoxicillin-pot clavulanate]; cephalosporins; penicillins; and codeine.        Review of Sys to make a final decision regarding therapy. Planned Follow Up  Patient will return for follow up at the end of 11/2019. Risk level: DVT and PE. I spent 30 minutes face to face with the patient.   More than 50% of that time was spent counseling th

## 2019-12-11 ENCOUNTER — OFFICE VISIT (OUTPATIENT)
Dept: HEMATOLOGY/ONCOLOGY | Age: 84
End: 2019-12-11
Attending: SPECIALIST
Payer: MEDICARE

## 2019-12-11 VITALS
BODY MASS INDEX: 32 KG/M2 | HEART RATE: 99 BPM | SYSTOLIC BLOOD PRESSURE: 136 MMHG | DIASTOLIC BLOOD PRESSURE: 70 MMHG | TEMPERATURE: 99 F | RESPIRATION RATE: 20 BRPM | WEIGHT: 187.81 LBS | OXYGEN SATURATION: 98 %

## 2019-12-11 DIAGNOSIS — I26.99 OTHER ACUTE PULMONARY EMBOLISM WITHOUT ACUTE COR PULMONALE (HCC): ICD-10-CM

## 2019-12-11 DIAGNOSIS — D50.8 IRON DEFICIENCY ANEMIA REFRACTORY TO IRON THERAPY: ICD-10-CM

## 2019-12-11 DIAGNOSIS — D50.0 IRON DEFICIENCY ANEMIA DUE TO CHRONIC BLOOD LOSS: ICD-10-CM

## 2019-12-11 DIAGNOSIS — I82.592 CHRONIC DEEP VEIN THROMBOSIS (DVT) OF OTHER VEIN OF LEFT LOWER EXTREMITY (HCC): ICD-10-CM

## 2019-12-11 DIAGNOSIS — I26.99 PULMONARY EMBOLISM, BILATERAL (HCC): Primary | ICD-10-CM

## 2019-12-11 PROCEDURE — 99215 OFFICE O/P EST HI 40 MIN: CPT | Performed by: SPECIALIST

## 2019-12-11 NOTE — PROGRESS NOTES
Patient is here today for follow up  with Carlos Torre for Anticoagulation - DVT/PE - Currently on Eliquis therapy. Patient denies pain. Medication list and medical history were reviewed and updated.      Education Record    Learner:  Patient    Disease / Lilia

## 2019-12-15 NOTE — PROGRESS NOTES
[facility] Progress Note        Patient Name: Micaela Smith   YOB: 1935  Medical Record Number: [id number]  Attending Physician: Moris Hyatt M.D.      Date of Visit: 12/11/2019      Chief Complaint  Left proximal gastrocnemius DV Back problem    • [facility]    TURBT - Dr. Ilsa Manuel   • Disorder of thyroid    • High blood pressure    • High cholesterol    • Hypothyroid    • Incontinence of urine    • Osteoarthritis    • Other and unspecified hyperlipidemia    • Unspeci History Social History    Tobacco Use      Smoking status: Former Smoker      Smokeless tobacco: Former User        Quit date: 7/31/2005    Alcohol use:  Yes      Alcohol/week: 2.0 standard drinks      Types: 2 Glasses of wine per week      Comment: daily provoked due to plane flight from [place] to [facility] in late July. Lupus anticoagulant and antiphospholipid antibodies were negative. Recommend at least 3 months of therapeutic anticoagulation.  I explained that it is not clear that this was provoked

## 2019-12-18 ENCOUNTER — OFFICE VISIT (OUTPATIENT)
Dept: HEMATOLOGY/ONCOLOGY | Age: 84
End: 2019-12-18
Attending: SPECIALIST
Payer: MEDICARE

## 2019-12-18 VITALS
TEMPERATURE: 99 F | HEART RATE: 65 BPM | RESPIRATION RATE: 20 BRPM | DIASTOLIC BLOOD PRESSURE: 65 MMHG | SYSTOLIC BLOOD PRESSURE: 113 MMHG | OXYGEN SATURATION: 100 %

## 2019-12-18 DIAGNOSIS — D50.8 IRON DEFICIENCY ANEMIA REFRACTORY TO IRON THERAPY: ICD-10-CM

## 2019-12-18 DIAGNOSIS — D50.0 IRON DEFICIENCY ANEMIA SECONDARY TO BLOOD LOSS (CHRONIC): Primary | ICD-10-CM

## 2019-12-18 PROCEDURE — 96365 THER/PROPH/DIAG IV INF INIT: CPT

## 2019-12-18 PROCEDURE — 96376 TX/PRO/DX INJ SAME DRUG ADON: CPT

## 2019-12-18 NOTE — PROGRESS NOTES
Education Record    Learner:  Patient    Disease / Diagnosis: GEORGE-INFED    Barriers / Limitations:  None   Comments:    Method:  Discussion   Comments:    General Topics:  Medication, Side effects and symptom management and Plan of care reviewed   Comments

## 2019-12-22 ENCOUNTER — HOSPITAL ENCOUNTER (OUTPATIENT)
Dept: ULTRASOUND IMAGING | Age: 84
Discharge: HOME | End: 2019-12-22
Attending: SPECIALIST
Payer: MEDICARE

## 2019-12-22 ENCOUNTER — HOSPITAL ENCOUNTER (OUTPATIENT)
Dept: CT IMAGING | Age: 84
Discharge: HOME | End: 2019-12-22
Attending: SPECIALIST
Payer: MEDICARE

## 2019-12-22 DIAGNOSIS — I82.592 CHRONIC DEEP VEIN THROMBOSIS (DVT) OF OTHER VEIN OF LEFT LOWER EXTREMITY (HCC): ICD-10-CM

## 2019-12-22 DIAGNOSIS — I26.99 PULMONARY EMBOLISM, BILATERAL (HCC): ICD-10-CM

## 2019-12-22 PROCEDURE — 93971 EXTREMITY STUDY: CPT | Performed by: SPECIALIST

## 2019-12-22 PROCEDURE — 71275 CT ANGIOGRAPHY CHEST: CPT | Performed by: SPECIALIST

## 2019-12-22 PROCEDURE — 82565 ASSAY OF CREATININE: CPT

## 2019-12-24 ENCOUNTER — TELEPHONE (OUTPATIENT)
Dept: HEMATOLOGY/ONCOLOGY | Facility: HOSPITAL | Age: 84
End: 2019-12-24

## 2019-12-24 NOTE — TELEPHONE ENCOUNTER
Test(s) completed:CTA and left venous doppler   Results: per Dr Broussard Fill call patient. Let her know that CT chest shows complete disappearance of the clot in the lungs. Doppler ultrasound shows significant improvement in clot in the leg.  What is le

## 2019-12-26 RX ORDER — FLUOXETINE HYDROCHLORIDE 20 MG/1
CAPSULE ORAL
Qty: 90 CAPSULE | Refills: 0 | Status: SHIPPED | OUTPATIENT
Start: 2019-12-26 | End: 2020-03-23

## 2019-12-29 DIAGNOSIS — I10 ESSENTIAL HYPERTENSION, BENIGN: ICD-10-CM

## 2019-12-30 RX ORDER — SIMVASTATIN 20 MG
TABLET ORAL
Qty: 90 TABLET | Refills: 0 | Status: SHIPPED | OUTPATIENT
Start: 2019-12-30 | End: 2020-04-06

## 2019-12-30 RX ORDER — VALSARTAN AND HYDROCHLOROTHIAZIDE 160; 25 MG/1; MG/1
TABLET ORAL
Qty: 90 TABLET | Refills: 0 | Status: SHIPPED | OUTPATIENT
Start: 2019-12-30 | End: 2020-04-06

## 2019-12-30 RX ORDER — LEVOTHYROXINE SODIUM 50 MCG
TABLET ORAL
Qty: 90 TABLET | Refills: 0 | Status: SHIPPED | OUTPATIENT
Start: 2019-12-30 | End: 2020-03-30

## 2020-01-03 ENCOUNTER — OFFICE VISIT (OUTPATIENT)
Dept: FAMILY MEDICINE CLINIC | Facility: CLINIC | Age: 85
End: 2020-01-03
Payer: COMMERCIAL

## 2020-01-03 VITALS
RESPIRATION RATE: 18 BRPM | HEART RATE: 69 BPM | OXYGEN SATURATION: 98 % | HEIGHT: 66 IN | BODY MASS INDEX: 30.05 KG/M2 | SYSTOLIC BLOOD PRESSURE: 110 MMHG | TEMPERATURE: 98 F | WEIGHT: 187 LBS | DIASTOLIC BLOOD PRESSURE: 52 MMHG

## 2020-01-03 DIAGNOSIS — J20.9 ACUTE BRONCHITIS, UNSPECIFIED ORGANISM: ICD-10-CM

## 2020-01-03 DIAGNOSIS — J01.00 ACUTE MAXILLARY SINUSITIS, RECURRENCE NOT SPECIFIED: Primary | ICD-10-CM

## 2020-01-03 PROCEDURE — 99213 OFFICE O/P EST LOW 20 MIN: CPT | Performed by: NURSE PRACTITIONER

## 2020-01-03 RX ORDER — APREMILAST 30 MG/1
TABLET, FILM COATED ORAL
COMMUNITY
Start: 2019-12-17 | End: 2020-12-28 | Stop reason: ALTCHOICE

## 2020-01-03 RX ORDER — ALBUTEROL SULFATE 90 UG/1
AEROSOL, METERED RESPIRATORY (INHALATION)
Qty: 1 INHALER | Refills: 0 | Status: SHIPPED | OUTPATIENT
Start: 2020-01-03 | End: 2021-10-07 | Stop reason: ALTCHOICE

## 2020-01-03 RX ORDER — DOXYCYCLINE HYCLATE 100 MG/1
100 CAPSULE ORAL 2 TIMES DAILY
Qty: 14 CAPSULE | Refills: 0 | Status: SHIPPED | OUTPATIENT
Start: 2020-01-03 | End: 2020-01-10 | Stop reason: ALTCHOICE

## 2020-01-03 NOTE — PROGRESS NOTES
Patient presents with:  Cough: cough, congestion (2 weeks)       HPI:   Ebony Pleitez is a 82year old female who presents for sinus congestion and cough for 2 weeks. Cough is productive of green mucus and is worse at night.  Cough is interfering with slee • raNITIdine HCl 300 MG Oral Tab TAKE 1 TABLET(300 MG) BY MOUTH EVERY DAY 90 tablet 1   • ketoconazole 2 % External Cream Apply 1 Application topically 2 (two) times daily.  (Patient taking differently: Apply 1 Application topically as needed.  ) 60 g 0   • NECK: supple, non-tender  LUNGS: Normal respiratory rate. Normal effort. occas dry cough. Lungs are clear. CARDIO: RRR without murmur  EXTREMITIES: no cyanosis, clubbing or edema  LYMPH:  no cervical lymphadenopathy.     ASSESSMENT AND PLAN:   Jerad Alvarez Your healthcare provider has told you that you have acute bronchitis. Bronchitis is infection or inflammation of the bronchial tubes (airways in the lungs). Normally, air moves easily in and out of the airways.  Bronchitis narrows the airways, making it barry · Drink plenty of fluids, such as water, juice, or warm soup. Fluids loosen mucus so that you can cough it up. This helps you breathe more easily. Fluids also prevent dehydration. · Make sure you get plenty of rest.  · Do not smoke.  Do not allow anyone el

## 2020-01-03 NOTE — PATIENT INSTRUCTIONS
Follow up with Dr. Ferny Wood next week      Acute Bronchitis  Your healthcare provider has told you that you have acute bronchitis. Bronchitis is infection or inflammation of the bronchial tubes (airways in the lungs).  Normally, air moves easily in and out infections. · Drink plenty of fluids, such as water, juice, or warm soup. Fluids loosen mucus so that you can cough it up. This helps you breathe more easily. Fluids also prevent dehydration. · Make sure you get plenty of rest.  · Do not smoke.  Do not al

## 2020-01-07 DIAGNOSIS — M17.11 PRIMARY OSTEOARTHRITIS OF RIGHT KNEE: ICD-10-CM

## 2020-01-07 RX ORDER — METOPROLOL SUCCINATE 50 MG/1
TABLET, EXTENDED RELEASE ORAL
Qty: 90 TABLET | Refills: 0 | Status: SHIPPED | OUTPATIENT
Start: 2020-01-07 | End: 2020-04-06

## 2020-01-07 NOTE — TELEPHONE ENCOUNTER
Last Time Medication was Filled:  9/30/2019      Last Office Visit with PCP: 3/22/2019      Future Appointments:  Future Appointments   Date Time Provider Rosa Isela Mancilla   1/10/2020 10:20 AM Volney Epley, MD [facility]   1/10/2020  1:00

## 2020-01-08 ENCOUNTER — APPOINTMENT (OUTPATIENT)
Dept: HEMATOLOGY/ONCOLOGY | Age: 85
End: 2020-01-08
Attending: SPECIALIST
Payer: MEDICARE

## 2020-01-10 ENCOUNTER — OFFICE VISIT (OUTPATIENT)
Dept: FAMILY MEDICINE CLINIC | Facility: CLINIC | Age: 85
End: 2020-01-10
Payer: COMMERCIAL

## 2020-01-10 ENCOUNTER — HOSPITAL ENCOUNTER (OUTPATIENT)
Dept: GENERAL RADIOLOGY | Age: 85
Discharge: HOME | End: 2020-01-10
Attending: FAMILY MEDICINE
Payer: MEDICARE

## 2020-01-10 VITALS
SYSTOLIC BLOOD PRESSURE: 120 MMHG | WEIGHT: 182 LBS | HEART RATE: 105 BPM | HEIGHT: 66 IN | DIASTOLIC BLOOD PRESSURE: 70 MMHG | OXYGEN SATURATION: 99 % | RESPIRATION RATE: 16 BRPM | TEMPERATURE: 98 F | BODY MASS INDEX: 29.25 KG/M2

## 2020-01-10 DIAGNOSIS — J20.9 ACUTE BRONCHITIS, UNSPECIFIED ORGANISM: Primary | ICD-10-CM

## 2020-01-10 DIAGNOSIS — R05.9 COUGH: ICD-10-CM

## 2020-01-10 PROCEDURE — 99214 OFFICE O/P EST MOD 30 MIN: CPT | Performed by: FAMILY MEDICINE

## 2020-01-10 PROCEDURE — 71046 X-RAY EXAM CHEST 2 VIEWS: CPT | Performed by: FAMILY MEDICINE

## 2020-01-10 RX ORDER — DEXTROMETHORPHAN HYDROBROMIDE AND PROMETHAZINE HYDROCHLORIDE 15; 6.25 MG/5ML; MG/5ML
5 SYRUP ORAL 4 TIMES DAILY PRN
Qty: 120 ML | Refills: 0 | Status: SHIPPED | OUTPATIENT
Start: 2020-01-10 | End: 2020-01-17

## 2020-01-10 RX ORDER — PREDNISONE 20 MG/1
TABLET ORAL
Qty: 18 TABLET | Refills: 0 | Status: SHIPPED | OUTPATIENT
Start: 2020-01-10 | End: 2020-07-01 | Stop reason: ALTCHOICE

## 2020-01-10 NOTE — PROGRESS NOTES
Patient presents with: Follow - Up: 1 week follow up on cold,not feeling any better,  still coughing, not breathing well. HPI:   Erick Whitaker is a 82year old female who presents for cough  for  2  weeks.  Patient reports dry cough, cough is keeping p DAY 90 tablet 1   • ketoconazole 2 % External Cream Apply 1 Application topically 2 (two) times daily.  (Patient taking differently: Apply 1 Application topically as needed.  ) 60 g 0   • TraMADol HCl 50 MG Oral Tab Take 50 mg by mouth every 8 (eight) hours Williano- Dr. En Beltran   • OTHER SURGICAL HISTORY      neck surgery      Family History   Problem Relation Age of Onset   • Cancer Father         liver   • Diabetes Father    • Heart Disorder Mother    • Diabetes Mother    • Diabetes Sister    • Heart Disorder S 40mg for 3 days and 20mg for 3 days.  -     Promethazine-DM 6.25-15 MG/5ML Oral Syrup; Take 5 mL by mouth 4 (four) times daily as needed for cough. Increase fluids, rest.Meds as above.   The patient indicates understanding of these issues and agrees to

## 2020-01-15 ENCOUNTER — OFFICE VISIT (OUTPATIENT)
Dept: HEMATOLOGY/ONCOLOGY | Age: 85
End: 2020-01-15
Attending: SPECIALIST
Payer: MEDICARE

## 2020-01-15 VITALS
TEMPERATURE: 98 F | OXYGEN SATURATION: 98 % | WEIGHT: 184.38 LBS | BODY MASS INDEX: 30 KG/M2 | SYSTOLIC BLOOD PRESSURE: 122 MMHG | HEART RATE: 73 BPM | RESPIRATION RATE: 20 BRPM | DIASTOLIC BLOOD PRESSURE: 62 MMHG

## 2020-01-15 DIAGNOSIS — I82.492 ACUTE DEEP VEIN THROMBOSIS (DVT) OF OTHER SPECIFIED VEIN OF LEFT LOWER EXTREMITY (HCC): ICD-10-CM

## 2020-01-15 DIAGNOSIS — D50.0 IRON DEFICIENCY ANEMIA DUE TO CHRONIC BLOOD LOSS: ICD-10-CM

## 2020-01-15 DIAGNOSIS — I26.99 PULMONARY EMBOLISM, BILATERAL (HCC): ICD-10-CM

## 2020-01-15 DIAGNOSIS — I26.99 OTHER ACUTE PULMONARY EMBOLISM WITHOUT ACUTE COR PULMONALE (HCC): ICD-10-CM

## 2020-01-15 DIAGNOSIS — D64.9 NORMOCYTIC NORMOCHROMIC ANEMIA: ICD-10-CM

## 2020-01-15 DIAGNOSIS — D50.8 IRON DEFICIENCY ANEMIA REFRACTORY TO IRON THERAPY: ICD-10-CM

## 2020-01-15 DIAGNOSIS — D50.0 IRON DEFICIENCY ANEMIA SECONDARY TO BLOOD LOSS (CHRONIC): Primary | ICD-10-CM

## 2020-01-15 LAB
BASOPHILS # BLD AUTO: 0.01 X10(3) UL (ref 0–0.2)
BASOPHILS NFR BLD AUTO: 0.1 %
D-DIMER: <0.27 UG/ML FEU (ref ?–0.82)
DEPRECATED HBV CORE AB SER IA-ACNC: 193.2 NG/ML (ref 18–340)
DEPRECATED RDW RBC AUTO: 61 FL (ref 35.1–46.3)
EOSINOPHIL # BLD AUTO: 0.02 X10(3) UL (ref 0–0.7)
EOSINOPHIL NFR BLD AUTO: 0.2 %
ERYTHROCYTE [DISTWIDTH] IN BLOOD BY AUTOMATED COUNT: 17.5 % (ref 11–15)
HCT VFR BLD AUTO: 29.2 % (ref 35–48)
HGB BLD-MCNC: 9.3 G/DL (ref 12–16)
HGB RETIC QN AUTO: 36.5 PG (ref 28.2–36.6)
IMM GRANULOCYTES # BLD AUTO: 0.18 X10(3) UL (ref 0–1)
IMM GRANULOCYTES NFR BLD: 2.2 %
IMM RETICS NFR: 0.21 RATIO (ref 0.1–0.3)
IRON SATURATION: 24 % (ref 15–50)
IRON SERPL-MCNC: 72 UG/DL (ref 50–170)
LYMPHOCYTES # BLD AUTO: 1.63 X10(3) UL (ref 1–4)
LYMPHOCYTES NFR BLD AUTO: 19.9 %
MCH RBC QN AUTO: 30.4 PG (ref 26–34)
MCHC RBC AUTO-ENTMCNC: 31.8 G/DL (ref 31–37)
MCV RBC AUTO: 95.4 FL (ref 80–100)
MONOCYTES # BLD AUTO: 0.53 X10(3) UL (ref 0.1–1)
MONOCYTES NFR BLD AUTO: 6.5 %
NEUTROPHILS # BLD AUTO: 5.84 X10 (3) UL (ref 1.5–7.7)
NEUTROPHILS # BLD AUTO: 5.84 X10(3) UL (ref 1.5–7.7)
NEUTROPHILS NFR BLD AUTO: 71.1 %
PLATELET # BLD AUTO: 255 10(3)UL (ref 150–450)
RBC # BLD AUTO: 3.06 X10(6)UL (ref 3.8–5.3)
RETICS # AUTO: 119.9 X10(3) UL (ref 22.5–147.5)
RETICS/RBC NFR AUTO: 4 % (ref 0.5–2.5)
TOTAL IRON BINDING CAPACITY: 295 UG/DL (ref 240–450)
TRANSFERRIN SERPL-MCNC: 198 MG/DL (ref 200–360)
VIT B12 SERPL-MCNC: 448 PG/ML (ref 193–986)
WBC # BLD AUTO: 8.2 X10(3) UL (ref 4–11)

## 2020-01-15 PROCEDURE — 99214 OFFICE O/P EST MOD 30 MIN: CPT | Performed by: SPECIALIST

## 2020-01-15 NOTE — PROGRESS NOTES
Patient is here today for follow up with Jamshid Victoria for DVT/PE / Eliquis therapy and Iron deficiency anemia. Patient denies pain. Fatigued at times. Medication list and medical history were reviewed and updated.      Education Record    Learner:  Patient

## 2020-01-21 ENCOUNTER — TELEPHONE (OUTPATIENT)
Dept: HEMATOLOGY/ONCOLOGY | Age: 85
End: 2020-01-21

## 2020-01-21 DIAGNOSIS — E78.00 PURE HYPERCHOLESTEROLEMIA: ICD-10-CM

## 2020-01-21 DIAGNOSIS — E03.9 ACQUIRED HYPOTHYROIDISM: ICD-10-CM

## 2020-01-21 DIAGNOSIS — R79.89 ELEVATED SERUM CREATININE: Primary | ICD-10-CM

## 2020-01-21 RX ORDER — APIXABAN 5 MG/1
TABLET, FILM COATED ORAL
Qty: 60 TABLET | Refills: 0 | OUTPATIENT
Start: 2020-01-21

## 2020-01-27 NOTE — PROGRESS NOTES
[facility] Progress Note        Patient Name: Igor Rdz   YOB: 1935  Medical Record Number: [id number]  Attending Physician: Berta Villeda. Patricia Machado M.D.      Date of Visit: 1/15/2020      Chief Complaint  Left proximal gastrocnemius DVT Surgical History:   Procedure Laterality Date   • ANESTH,KNEE AREA SURGERY      arthrocentesis aspiration of knee joint   • CERVICAL EPIDURAL STEROID INJECTION N/A 5/30/2018    Performed by Derek Pineda MD at [facility] MAIN OR   • COLONOSCOPY     • CYSTO, WITH IN No          Current Medications No current facility-administered medications on file as of 1/15/2020. Allergies   Ms. Tonia Odell is allergic to amoxil; augmentin, [amoxicillin-pot clavulanate]; cephalosporins; penicillins; and codeine.        Review of Syst 4.0 - 11.0 x10(3) uL    RBC 3.06 (L) 3.80 - 5.30 x10(6)uL    HGB 9.3 (L) 12.0 - 16.0 g/dL    HCT 29.2 (L) 35.0 - 48.0 %    .0 150.0 - 450.0 10(3)uL    MCV 95.4 80.0 - 100.0 fL    MCH 30.4 26.0 - 34.0 pg    MCHC 31.8 31.0 - 37.0 g/dL    RDW 17.5 (H) liver and spleen. 2 mm nonobstructing calculus mid pole cortex right kidney. BONES: Stable marked multiple level degenerative disc disease in the dorsal spine and lower cervical spine and upper lumbar spine. OTHER:  Negative. CONCLUSION:  1.  No evidence of signed by:    Madyson Mae M.D.   [facility]  Director, Bone and Soft Tissue Sarcoma Program  Section of Hematology/Oncology, [employer]

## 2020-03-23 RX ORDER — FLUOXETINE HYDROCHLORIDE 20 MG/1
20 CAPSULE ORAL
Qty: 90 CAPSULE | Refills: 1 | Status: SHIPPED | OUTPATIENT
Start: 2020-03-23 | End: 2020-09-28

## 2020-03-23 NOTE — TELEPHONE ENCOUNTER
Medication(s) to Refill:   Requested Prescriptions     Pending Prescriptions Disp Refills   • FLUoxetine HCl 20 MG Oral Cap 90 capsule 1     Sig: Take 1 capsule (20 mg total) by mouth once daily.          Reason for Medication Refill being sent to Provider

## 2020-03-30 RX ORDER — LEVOTHYROXINE SODIUM 50 MCG
TABLET ORAL
Qty: 90 TABLET | Refills: 0 | Status: SHIPPED | OUTPATIENT
Start: 2020-03-30 | End: 2020-08-07

## 2020-04-06 DIAGNOSIS — M17.11 PRIMARY OSTEOARTHRITIS OF RIGHT KNEE: ICD-10-CM

## 2020-04-06 DIAGNOSIS — I10 ESSENTIAL HYPERTENSION, BENIGN: ICD-10-CM

## 2020-04-06 RX ORDER — SIMVASTATIN 20 MG
TABLET ORAL
Qty: 90 TABLET | Refills: 0 | Status: SHIPPED | OUTPATIENT
Start: 2020-04-06 | End: 2020-07-09

## 2020-04-06 RX ORDER — VALSARTAN AND HYDROCHLOROTHIAZIDE 160; 25 MG/1; MG/1
TABLET ORAL
Qty: 90 TABLET | Refills: 0 | Status: SHIPPED | OUTPATIENT
Start: 2020-04-06 | End: 2020-07-17

## 2020-04-06 RX ORDER — METOPROLOL SUCCINATE 50 MG/1
TABLET, EXTENDED RELEASE ORAL
Qty: 90 TABLET | Refills: 0 | Status: SHIPPED | OUTPATIENT
Start: 2020-04-06 | End: 2020-07-17

## 2020-04-06 NOTE — TELEPHONE ENCOUNTER
Medication(s) to Refill:   Requested Prescriptions     Pending Prescriptions Disp Refills   • SIMVASTATIN 20 MG Oral Tab [Pharmacy Med Name: SIMVASTATIN 20MG TABLETS] 90 tablet 0     Sig: TAKE 1 TABLET BY MOUTH EVERY EVENING         Reason for Medication R

## 2020-04-15 ENCOUNTER — APPOINTMENT (OUTPATIENT)
Dept: HEMATOLOGY/ONCOLOGY | Age: 85
End: 2020-04-15
Attending: SPECIALIST
Payer: MEDICARE

## 2020-04-24 RX ORDER — FOLIC ACID 1 MG/1
TABLET ORAL
Qty: 90 TABLET | Refills: 0 | Status: SHIPPED | OUTPATIENT
Start: 2020-04-24 | End: 2020-08-03

## 2020-06-24 ENCOUNTER — OFFICE VISIT (OUTPATIENT)
Dept: HEMATOLOGY/ONCOLOGY | Facility: HOSPITAL | Age: 85
End: 2020-06-24
Attending: SPECIALIST
Payer: MEDICARE

## 2020-06-24 ENCOUNTER — APPOINTMENT (OUTPATIENT)
Dept: HEMATOLOGY/ONCOLOGY | Age: 85
End: 2020-06-24
Attending: SPECIALIST
Payer: MEDICARE

## 2020-06-24 ENCOUNTER — TELEPHONE (OUTPATIENT)
Dept: FAMILY MEDICINE CLINIC | Facility: CLINIC | Age: 85
End: 2020-06-24

## 2020-06-24 DIAGNOSIS — D64.9 NORMOCYTIC NORMOCHROMIC ANEMIA: ICD-10-CM

## 2020-06-24 DIAGNOSIS — D50.0 IRON DEFICIENCY ANEMIA SECONDARY TO BLOOD LOSS (CHRONIC): Primary | ICD-10-CM

## 2020-06-24 DIAGNOSIS — D50.8 IRON DEFICIENCY ANEMIA REFRACTORY TO IRON THERAPY: ICD-10-CM

## 2020-06-28 NOTE — PROGRESS NOTES
[facility] Progress Note        Patient Name: Urbano Craig   YOB: 1935  Medical Record Number: [id number]  Attending Physician: Haydee Santillan. Shira Santos M.D.      Date of Visit: 7/8/2020      Chief Complaint  Left proximal gastro Disorder of thyroid    • High blood pressure    • High cholesterol    • Hypothyroid    • Incontinence of urine    • Osteoarthritis    • Other and unspecified hyperlipidemia    • Unspecified essential hypertension          Past Surgical History   Past Surgi History    Tobacco Use      Smoking status: Former Smoker      Smokeless tobacco: Former User        Quit date: 7/31/2005    Alcohol use: Yes      Alcohol/week: 2.0 standard drinks      Types: 2 Glasses of wine per week      Comment: daily    Drug use:  No hematochezia. Genitourinary No hematuria or vaginal bleeding.      Vital Signs   /50 (BP Location: Left arm, Patient Position: Sitting, Cuff Size: large)   Pulse 69   Temp 99.3 °F (37.4 °C) (Tympanic)   Resp 20   Wt 81.3 kg (179 lb 3.2 oz)   SpO2 99% DIFFERENTIAL    Collection Time: 07/08/20  1:04 PM   Result Value Ref Range    WBC 10.2 4.0 - 11.0 x10(3) uL    RBC 3.62 (L) 3.80 - 5.30 x10(6)uL    HGB 11.6 (L) 12.0 - 16.0 g/dL    HCT 35.1 35.0 - 48.0 %    .0 150.0 - 450.0 10(3)uL    MCV 97.0 80.0 return to see me PRN. Risk level: Iron deficiency anemia. Electronically signed by:    Dejuan Gonzalez M.D.   Associate Medical Director of Oncology [facility], [place], [place]

## 2020-07-01 ENCOUNTER — OFFICE VISIT (OUTPATIENT)
Dept: FAMILY MEDICINE CLINIC | Facility: CLINIC | Age: 85
End: 2020-07-01
Payer: COMMERCIAL

## 2020-07-01 VITALS
HEART RATE: 69 BPM | WEIGHT: 179 LBS | OXYGEN SATURATION: 96 % | TEMPERATURE: 99 F | HEIGHT: 66 IN | DIASTOLIC BLOOD PRESSURE: 62 MMHG | SYSTOLIC BLOOD PRESSURE: 132 MMHG | RESPIRATION RATE: 20 BRPM | BODY MASS INDEX: 28.77 KG/M2

## 2020-07-01 DIAGNOSIS — E78.00 PURE HYPERCHOLESTEROLEMIA: ICD-10-CM

## 2020-07-01 DIAGNOSIS — R20.0 NUMBNESS: ICD-10-CM

## 2020-07-01 DIAGNOSIS — J30.1 SEASONAL ALLERGIC RHINITIS DUE TO POLLEN: ICD-10-CM

## 2020-07-01 DIAGNOSIS — N18.30 CKD (CHRONIC KIDNEY DISEASE) STAGE 3, GFR 30-59 ML/MIN (HCC): ICD-10-CM

## 2020-07-01 DIAGNOSIS — D50.0 IRON DEFICIENCY ANEMIA SECONDARY TO BLOOD LOSS (CHRONIC): ICD-10-CM

## 2020-07-01 DIAGNOSIS — J41.0 SIMPLE CHRONIC BRONCHITIS (HCC): ICD-10-CM

## 2020-07-01 DIAGNOSIS — F33.8 SEASONAL DEPRESSION (HCC): ICD-10-CM

## 2020-07-01 DIAGNOSIS — I10 ESSENTIAL HYPERTENSION, BENIGN: ICD-10-CM

## 2020-07-01 DIAGNOSIS — L40.50 PSORIATIC ARTHRITIS (HCC): ICD-10-CM

## 2020-07-01 DIAGNOSIS — L40.9 PSORIASIS: ICD-10-CM

## 2020-07-01 DIAGNOSIS — K21.9 GASTROESOPHAGEAL REFLUX DISEASE WITHOUT ESOPHAGITIS: ICD-10-CM

## 2020-07-01 DIAGNOSIS — E03.9 ACQUIRED HYPOTHYROIDISM: ICD-10-CM

## 2020-07-01 DIAGNOSIS — M47.812 FACET ARTHRITIS OF CERVICAL REGION: ICD-10-CM

## 2020-07-01 DIAGNOSIS — M47.816 LUMBAR ARTHROPATHY: ICD-10-CM

## 2020-07-01 DIAGNOSIS — D50.8 IRON DEFICIENCY ANEMIA REFRACTORY TO IRON THERAPY: ICD-10-CM

## 2020-07-01 DIAGNOSIS — I82.592 CHRONIC DEEP VEIN THROMBOSIS (DVT) OF OTHER VEIN OF LEFT LOWER EXTREMITY (HCC): Primary | ICD-10-CM

## 2020-07-01 DIAGNOSIS — J41.0 SMOKERS' COUGH (HCC): ICD-10-CM

## 2020-07-01 PROBLEM — M11.261 PSEUDOGOUT OF RIGHT KNEE: Status: RESOLVED | Noted: 2018-05-16 | Resolved: 2020-07-01

## 2020-07-01 PROBLEM — I26.99 OTHER ACUTE PULMONARY EMBOLISM WITHOUT ACUTE COR PULMONALE (HCC): Status: RESOLVED | Noted: 2019-08-26 | Resolved: 2020-07-01

## 2020-07-01 PROBLEM — I26.99 PULMONARY EMBOLUS (HCC): Status: RESOLVED | Noted: 2019-08-26 | Resolved: 2020-07-01

## 2020-07-01 PROBLEM — M17.11 PRIMARY OSTEOARTHRITIS OF RIGHT KNEE: Status: RESOLVED | Noted: 2018-05-16 | Resolved: 2020-07-01

## 2020-07-01 PROCEDURE — 99397 PER PM REEVAL EST PAT 65+ YR: CPT | Performed by: FAMILY MEDICINE

## 2020-07-01 PROCEDURE — 96160 PT-FOCUSED HLTH RISK ASSMT: CPT | Performed by: FAMILY MEDICINE

## 2020-07-01 PROCEDURE — G0439 PPPS, SUBSEQ VISIT: HCPCS | Performed by: FAMILY MEDICINE

## 2020-07-01 NOTE — PROGRESS NOTES
REASON FOR VISIT:    Yolie Chester is a 82year old female who presents for a MA Supervisit.     HPI:     Patient Care Team: Patient Care Team:  Es Pruett MD as PCP - General (Family Practice)  Lindsay Borges MD as Consulting Physician (UROLOGY)  So by mouth at first sign of gout flare then take 0.6 mg (1 tablet) 1 hour later      • methotrexate 2.5 MG Oral Tab Take 15 mg by mouth once a week. • TraMADol HCl 50 MG Oral Tab Take 50 mg by mouth every 8 (eight) hours as needed.     0       Glucose and are a male age 38-65 or a female age 47-67, do you take aspirin?: No  Have you had any immunizations at another office such as Influenza, Hepatitis B, Tetanus, or Pneumococcal?: No     Functional Ability     Bathing or Showering: Able without help  Madelaine Kraus 07/29/2011 5.9 (H)     HGBA1C (%)   Date Value   04/17/2014 5.9 (H)     HgbA1C (%)   Date Value   04/15/2016 5.8 (H)       Fasting Blood Sugar (FSB)Annually Glucose (mg/dL)   Date Value   08/30/2019 119 (H)   12/01/2015 130 (H)     GLUCOSE (mg/dL)   Date knee joint   • CERVICAL EPIDURAL STEROID INJECTION N/A 5/30/2018    Performed by Bebeto Holman MD at [facility] MAIN OR   • COLONOSCOPY     • CYSTO, WITH INSERTION OF STENT Right 9/2/2015    Performed by Jillian Cartagena MD at [facility] 07/01/2011      HEP B                 07/01/2011      Influenza             10/15/2007  09/25/2008  09/25/2009                            09/27/2010  09/13/2011  09/16/2013      Pneumococcal (Prevnar 13)                          04/15/2016      Pneumovax 2 deferred  MUSCULOSKELETAL: back is not tender, FROM of the back  EXTREMITIES: no cyanosis, clubbing or edema  NEURO: Oriented times three, cranial nerves are intact, motor and sensory are grossly intact      ASSESSMENT AND PLAN OF RELEVANT CHRONIC CONDITIO patient. Pneumonia: There are no preventive care reminders to display for this patient.   Shingrix shingles vaccine is due

## 2020-07-01 NOTE — PATIENT INSTRUCTIONS
[facility]    [phone]      [address] [place] [address]  [address]  [place], [address] [place] [address]    CQADO:[phone]  MNT:[phone]      Dr. Deacon Sim for nerve study. Neurology  [employer]    Phone: [phone]

## 2020-07-08 ENCOUNTER — OFFICE VISIT (OUTPATIENT)
Dept: HEMATOLOGY/ONCOLOGY | Age: 85
End: 2020-07-08
Attending: SPECIALIST
Payer: MEDICARE

## 2020-07-08 VITALS
WEIGHT: 179.19 LBS | BODY MASS INDEX: 29 KG/M2 | SYSTOLIC BLOOD PRESSURE: 107 MMHG | DIASTOLIC BLOOD PRESSURE: 50 MMHG | RESPIRATION RATE: 20 BRPM | HEART RATE: 69 BPM | TEMPERATURE: 99 F | OXYGEN SATURATION: 99 %

## 2020-07-08 DIAGNOSIS — R79.89 ELEVATED SERUM CREATININE: Primary | ICD-10-CM

## 2020-07-08 DIAGNOSIS — D64.9 NORMOCYTIC NORMOCHROMIC ANEMIA: ICD-10-CM

## 2020-07-08 DIAGNOSIS — D50.0 IRON DEFICIENCY ANEMIA SECONDARY TO BLOOD LOSS (CHRONIC): ICD-10-CM

## 2020-07-08 DIAGNOSIS — I87.009 POST-PHLEBITIC SYNDROME: ICD-10-CM

## 2020-07-08 DIAGNOSIS — D50.8 IRON DEFICIENCY ANEMIA REFRACTORY TO IRON THERAPY: ICD-10-CM

## 2020-07-08 LAB
ANION GAP SERPL CALC-SCNC: 6 MMOL/L (ref 0–18)
BASOPHILS # BLD AUTO: 0.04 X10(3) UL (ref 0–0.2)
BASOPHILS NFR BLD AUTO: 0.4 %
BUN BLD-MCNC: 45 MG/DL (ref 7–18)
BUN/CREAT SERPL: 26.5 (ref 10–20)
CALCIUM BLD-MCNC: 8.8 MG/DL (ref 8.5–10.1)
CHLORIDE SERPL-SCNC: 108 MMOL/L (ref 98–112)
CO2 SERPL-SCNC: 26 MMOL/L (ref 21–32)
CREAT BLD-MCNC: 1.7 MG/DL (ref 0.55–1.02)
DEPRECATED HBV CORE AB SER IA-ACNC: 50.4 NG/ML (ref 18–340)
DEPRECATED RDW RBC AUTO: 53.2 FL (ref 35.1–46.3)
EOSINOPHIL # BLD AUTO: 0.12 X10(3) UL (ref 0–0.7)
EOSINOPHIL NFR BLD AUTO: 1.2 %
ERYTHROCYTE [DISTWIDTH] IN BLOOD BY AUTOMATED COUNT: 15 % (ref 11–15)
GLUCOSE BLD-MCNC: 107 MG/DL (ref 70–99)
HCT VFR BLD AUTO: 35.1 % (ref 35–48)
HGB BLD-MCNC: 11.6 G/DL (ref 12–16)
IMM GRANULOCYTES # BLD AUTO: 0.08 X10(3) UL (ref 0–1)
IMM GRANULOCYTES NFR BLD: 0.8 %
IRON SATURATION: 16 % (ref 15–50)
IRON SERPL-MCNC: 72 UG/DL (ref 50–170)
LYMPHOCYTES # BLD AUTO: 2.3 X10(3) UL (ref 1–4)
LYMPHOCYTES NFR BLD AUTO: 22.7 %
MCH RBC QN AUTO: 32 PG (ref 26–34)
MCHC RBC AUTO-ENTMCNC: 33 G/DL (ref 31–37)
MCV RBC AUTO: 97 FL (ref 80–100)
MONOCYTES # BLD AUTO: 1.26 X10(3) UL (ref 0.1–1)
MONOCYTES NFR BLD AUTO: 12.4 %
NEUTROPHILS # BLD AUTO: 6.35 X10 (3) UL (ref 1.5–7.7)
NEUTROPHILS # BLD AUTO: 6.35 X10(3) UL (ref 1.5–7.7)
NEUTROPHILS NFR BLD AUTO: 62.5 %
OSMOLALITY SERPL CALC.SUM OF ELEC: 302 MOSM/KG (ref 275–295)
PATIENT FASTING Y/N/NP: NO
PLATELET # BLD AUTO: 238 10(3)UL (ref 150–450)
POTASSIUM SERPL-SCNC: 4.4 MMOL/L (ref 3.5–5.1)
RBC # BLD AUTO: 3.62 X10(6)UL (ref 3.8–5.3)
SODIUM SERPL-SCNC: 140 MMOL/L (ref 136–145)
TOTAL IRON BINDING CAPACITY: 447 UG/DL (ref 240–450)
TRANSFERRIN SERPL-MCNC: 300 MG/DL (ref 200–360)
WBC # BLD AUTO: 10.2 X10(3) UL (ref 4–11)

## 2020-07-08 PROCEDURE — 99215 OFFICE O/P EST HI 40 MIN: CPT | Performed by: SPECIALIST

## 2020-07-08 NOTE — PROGRESS NOTES
Education Record    Learner:  Patient  Barriers / Limitations:  None  Method:  Discussion  Outcome:  Shows understanding  Comments: Pt reporting left calf pain, waiting to hear back from her PCP if a doppler has been scheduled for her.  Pt also noted to be

## 2020-07-09 ENCOUNTER — TELEPHONE (OUTPATIENT)
Dept: FAMILY MEDICINE CLINIC | Facility: CLINIC | Age: 85
End: 2020-07-09

## 2020-07-09 ENCOUNTER — TELEPHONE (OUTPATIENT)
Dept: HEMATOLOGY/ONCOLOGY | Facility: HOSPITAL | Age: 85
End: 2020-07-09

## 2020-07-09 RX ORDER — SIMVASTATIN 20 MG
TABLET ORAL
Qty: 90 TABLET | Refills: 0 | Status: SHIPPED | OUTPATIENT
Start: 2020-07-09 | End: 2020-10-01

## 2020-07-09 NOTE — TELEPHONE ENCOUNTER
Please see below message. Creat 1.70 on 7/8,  Last Creat on 8/30/19 was 1.49. LOV 7/1. Please advise on any orders for pt's elevated Creat.

## 2020-07-09 NOTE — TELEPHONE ENCOUNTER
Pt notified of below orders. Pt states she will be going to [facility]. Orders placed in Epic. All questions answered, pt expresses understanding.

## 2020-07-09 NOTE — TELEPHONE ENCOUNTER
Please call patient and remind her that she is due for labs - order is in system. Thank you!       Medication(s) to Refill:   Requested Prescriptions     Pending Prescriptions Disp Refills   • SIMVASTATIN 20 MG Oral Tab [Pharmacy Med Name: SIMVASTATIN 20MG

## 2020-07-09 NOTE — TELEPHONE ENCOUNTER
Porsche Kessler MD  [facility]             Let her know that her iron studies are normal. However, her kidney function is worse than it was the last time it was check in august 2019.  I recommend that she follow up with her primary care ph

## 2020-07-17 ENCOUNTER — APPOINTMENT (OUTPATIENT)
Dept: LAB | Age: 85
End: 2020-07-17
Attending: FAMILY MEDICINE
Payer: MEDICARE

## 2020-07-17 ENCOUNTER — HOSPITAL ENCOUNTER (OUTPATIENT)
Dept: ULTRASOUND IMAGING | Age: 85
Discharge: HOME | End: 2020-07-17
Attending: FAMILY MEDICINE
Payer: MEDICARE

## 2020-07-17 DIAGNOSIS — E03.9 ACQUIRED HYPOTHYROIDISM: ICD-10-CM

## 2020-07-17 DIAGNOSIS — I10 ESSENTIAL HYPERTENSION, BENIGN: ICD-10-CM

## 2020-07-17 DIAGNOSIS — E78.00 PURE HYPERCHOLESTEROLEMIA: ICD-10-CM

## 2020-07-17 DIAGNOSIS — M17.11 PRIMARY OSTEOARTHRITIS OF RIGHT KNEE: ICD-10-CM

## 2020-07-17 DIAGNOSIS — I82.592 CHRONIC DEEP VEIN THROMBOSIS (DVT) OF OTHER VEIN OF LEFT LOWER EXTREMITY (HCC): ICD-10-CM

## 2020-07-17 LAB
ALBUMIN SERPL-MCNC: 3.5 G/DL (ref 3.4–5)
ALBUMIN/GLOB SERPL: 1 {RATIO} (ref 1–2)
ALP LIVER SERPL-CCNC: 49 U/L (ref 55–142)
ALT SERPL-CCNC: 25 U/L (ref 13–56)
ANION GAP SERPL CALC-SCNC: 4 MMOL/L (ref 0–18)
AST SERPL-CCNC: 16 U/L (ref 15–37)
BILIRUB SERPL-MCNC: 0.6 MG/DL (ref 0.1–2)
BILIRUB UR QL STRIP.AUTO: NEGATIVE
BUN BLD-MCNC: 37 MG/DL (ref 7–18)
BUN/CREAT SERPL: 34.6 (ref 10–20)
CALCIUM BLD-MCNC: 9 MG/DL (ref 8.5–10.1)
CHLORIDE SERPL-SCNC: 109 MMOL/L (ref 98–112)
CHOLEST SMN-MCNC: 210 MG/DL (ref ?–200)
CLARITY UR REFRACT.AUTO: CLEAR
CO2 SERPL-SCNC: 24 MMOL/L (ref 21–32)
COLOR UR AUTO: YELLOW
CREAT BLD-MCNC: 1.07 MG/DL (ref 0.55–1.02)
GLOBULIN PLAS-MCNC: 3.5 G/DL (ref 2.8–4.4)
GLUCOSE BLD-MCNC: 96 MG/DL (ref 70–99)
GLUCOSE UR STRIP.AUTO-MCNC: NEGATIVE MG/DL
HDLC SERPL-MCNC: 88 MG/DL (ref 40–59)
HYALINE CASTS #/AREA URNS AUTO: PRESENT /LPF
KETONES UR STRIP.AUTO-MCNC: NEGATIVE MG/DL
LDLC SERPL CALC-MCNC: 88 MG/DL (ref ?–100)
M PROTEIN MFR SERPL ELPH: 7 G/DL (ref 6.4–8.2)
NITRITE UR QL STRIP.AUTO: NEGATIVE
NONHDLC SERPL-MCNC: 122 MG/DL (ref ?–130)
OSMOLALITY SERPL CALC.SUM OF ELEC: 293 MOSM/KG (ref 275–295)
PATIENT FASTING Y/N/NP: YES
PATIENT FASTING Y/N/NP: YES
PH UR STRIP.AUTO: 5 [PH] (ref 4.5–8)
POTASSIUM SERPL-SCNC: 4.1 MMOL/L (ref 3.5–5.1)
PROT UR STRIP.AUTO-MCNC: NEGATIVE MG/DL
SODIUM SERPL-SCNC: 137 MMOL/L (ref 136–145)
SP GR UR STRIP.AUTO: 1.01 (ref 1–1.03)
TRIGL SERPL-MCNC: 169 MG/DL (ref 30–149)
TSI SER-ACNC: 0.5 MIU/ML (ref 0.36–3.74)
UROBILINOGEN UR STRIP.AUTO-MCNC: <2 MG/DL
VLDLC SERPL CALC-MCNC: 34 MG/DL (ref 0–30)

## 2020-07-17 PROCEDURE — 84443 ASSAY THYROID STIM HORMONE: CPT

## 2020-07-17 PROCEDURE — 87086 URINE CULTURE/COLONY COUNT: CPT | Performed by: SPECIALIST

## 2020-07-17 PROCEDURE — 93971 EXTREMITY STUDY: CPT | Performed by: FAMILY MEDICINE

## 2020-07-17 PROCEDURE — 81001 URINALYSIS AUTO W/SCOPE: CPT | Performed by: SPECIALIST

## 2020-07-17 RX ORDER — VALSARTAN AND HYDROCHLOROTHIAZIDE 160; 25 MG/1; MG/1
1 TABLET ORAL DAILY
Qty: 90 TABLET | Refills: 0 | Status: SHIPPED | OUTPATIENT
Start: 2020-07-17 | End: 2020-10-01

## 2020-07-17 RX ORDER — METOPROLOL SUCCINATE 50 MG/1
50 TABLET, EXTENDED RELEASE ORAL DAILY
Qty: 90 TABLET | Refills: 0 | Status: SHIPPED | OUTPATIENT
Start: 2020-07-17 | End: 2020-10-01

## 2020-07-17 NOTE — TELEPHONE ENCOUNTER
Medication(s) to Refill:   Requested Prescriptions     Pending Prescriptions Disp Refills   • Valsartan-hydroCHLOROthiazide 160-25 MG Oral Tab 90 tablet 0     Sig: Take 1 tablet by mouth daily.          Reason for Medication Refill being sent to Provider /

## 2020-07-21 ENCOUNTER — TELEPHONE (OUTPATIENT)
Dept: FAMILY MEDICINE CLINIC | Facility: CLINIC | Age: 85
End: 2020-07-21

## 2020-07-21 NOTE — TELEPHONE ENCOUNTER
----- Message from Jenelle Matson MD sent at 7/20/2020  9:58 AM CDT -----  Normal results. ([address])  Alanis Falk MD sent to P [facility] Clinical Staff             Much better kidney function, good labs.       (lab results)  Alanis Falk MD sent to P

## 2020-07-27 ENCOUNTER — OFFICE VISIT (OUTPATIENT)
Dept: FAMILY MEDICINE CLINIC | Facility: CLINIC | Age: 85
End: 2020-07-27
Payer: COMMERCIAL

## 2020-07-27 VITALS
TEMPERATURE: 98 F | SYSTOLIC BLOOD PRESSURE: 124 MMHG | HEART RATE: 61 BPM | OXYGEN SATURATION: 97 % | DIASTOLIC BLOOD PRESSURE: 60 MMHG | RESPIRATION RATE: 16 BRPM

## 2020-07-27 DIAGNOSIS — H00.011 HORDEOLUM EXTERNUM OF RIGHT UPPER EYELID: Primary | ICD-10-CM

## 2020-07-27 PROCEDURE — 99213 OFFICE O/P EST LOW 20 MIN: CPT | Performed by: NURSE PRACTITIONER

## 2020-07-27 PROCEDURE — 3078F DIAST BP <80 MM HG: CPT | Performed by: NURSE PRACTITIONER

## 2020-07-27 PROCEDURE — 3074F SYST BP LT 130 MM HG: CPT | Performed by: NURSE PRACTITIONER

## 2020-07-27 RX ORDER — ERYTHROMYCIN 5 MG/G
1 OINTMENT OPHTHALMIC EVERY 6 HOURS
Qty: 3.5 G | Refills: 0 | Status: SHIPPED | OUTPATIENT
Start: 2020-07-27 | End: 2020-08-03

## 2020-07-27 NOTE — PATIENT INSTRUCTIONS
Scrub the eyelashes twice a day (you can try using a small amount of baby shampoo and water on a cotton ball to scrub the area. ). Apply a warm compress to the sty 3x daily. Use antibiotic ointment as prescribed.  Follow up with your eye doctor if not impr · Inability to open the eyelid due to swelling  · Fever of 100.4°F (38°C) or above, or as directed by your provider  · Vision changes  · Headache or stiff neck  · The sty comes back  Jeremias last reviewed this educational content on 8/1/2017  © 7848-2046

## 2020-07-27 NOTE — PROGRESS NOTES
CHIEF COMPLAINT:   No chief complaint on file. HPI:   Yolie Chester is a 82year old female who presents with chief complaint of bump on right eyelid. Symptoms began  7  days ago. Today looking red. Admits the eyelids have been somewhat itchy.    Denies • Other and unspecified hyperlipidemia    • Unspecified essential hypertension       Past Surgical History:   Procedure Laterality Date   • ANESTH,KNEE AREA SURGERY      arthrocentesis aspiration of knee joint   • CERVICAL EPIDURAL STEROID INJECTION N/A 5/ Types: 2 Glasses of wine per week      Comment: daily    Drug use: No        REVIEW OF SYSTEMS:   GENERAL: feels well otherwise  SKIN: no rashes  EYES:denies blurred vision or double vision.  See HPI  HENT: denies ear pain, congestion, sore throat  LUNG A sty is an infection of the oil gland of the eyelid. It may develop into a small pocket of pus (an abscess). This can cause pain, redness, and swelling.  In early stages, a sty is treated with antibiotic cream, eye drops, or a small towel soaked in warm wa © 4334-6494 [employer] [address]. [address] [facility], [address] [place]. All rights reserved. This information is not intended as a substitute for professional medical care. Always follow your healthcare professional's instructions.             Torres

## 2020-08-01 NOTE — TELEPHONE ENCOUNTER
Medication(s) to Refill:   Requested Prescriptions     Pending Prescriptions Disp Refills   • folic acid 1 MG Oral Tab [Pharmacy Med Name: FOLIC ACID 1MG TABLETS] 90 tablet 0     Sig: TAKE 1 TABLET BY MOUTH EVERY DAY         Reason for Medication Refill be

## 2020-08-03 RX ORDER — FOLIC ACID 1 MG/1
TABLET ORAL
Qty: 90 TABLET | Refills: 0 | Status: SHIPPED | OUTPATIENT
Start: 2020-08-03 | End: 2020-11-09

## 2020-08-07 RX ORDER — LEVOTHYROXINE SODIUM 50 MCG
TABLET ORAL
Qty: 90 TABLET | Refills: 0 | Status: SHIPPED | OUTPATIENT
Start: 2020-08-07 | End: 2020-11-09

## 2020-09-28 RX ORDER — FLUOXETINE HYDROCHLORIDE 20 MG/1
CAPSULE ORAL
Qty: 90 CAPSULE | Refills: 1 | Status: SHIPPED | OUTPATIENT
Start: 2020-09-28 | End: 2021-03-15

## 2020-10-01 DIAGNOSIS — M17.11 PRIMARY OSTEOARTHRITIS OF RIGHT KNEE: ICD-10-CM

## 2020-10-01 DIAGNOSIS — I10 ESSENTIAL HYPERTENSION, BENIGN: ICD-10-CM

## 2020-10-01 RX ORDER — SIMVASTATIN 20 MG
TABLET ORAL
Qty: 90 TABLET | Refills: 1 | Status: SHIPPED | OUTPATIENT
Start: 2020-10-01 | End: 2021-03-04

## 2020-10-01 RX ORDER — METOPROLOL SUCCINATE 50 MG/1
TABLET, EXTENDED RELEASE ORAL
Qty: 90 TABLET | Refills: 1 | Status: SHIPPED | OUTPATIENT
Start: 2020-10-01 | End: 2021-03-15

## 2020-10-01 RX ORDER — VALSARTAN AND HYDROCHLOROTHIAZIDE 160; 25 MG/1; MG/1
1 TABLET ORAL DAILY
Qty: 90 TABLET | Refills: 1 | Status: SHIPPED | OUTPATIENT
Start: 2020-10-01 | End: 2020-11-09

## 2020-10-01 NOTE — TELEPHONE ENCOUNTER
Medication(s) to Refill:   Requested Prescriptions     Pending Prescriptions Disp Refills   • VALSARTAN-HYDROCHLOROTHIAZIDE 160-25 MG Oral Tab [Pharmacy Med Name: VALSARTAN/HCTZ 160MG/25MG TABLETS] 90 tablet 0     Sig: TAKE 1 TABLET BY MOUTH DAILY   • ISIDRA

## 2020-10-07 ENCOUNTER — TELEPHONE (OUTPATIENT)
Dept: FAMILY MEDICINE CLINIC | Facility: CLINIC | Age: 85
End: 2020-10-07

## 2020-10-07 NOTE — TELEPHONE ENCOUNTER
Pt calling in, stating that she will call office back once all her tests are completed to schedule her pre op appointment.

## 2020-11-09 DIAGNOSIS — I10 ESSENTIAL HYPERTENSION, BENIGN: ICD-10-CM

## 2020-11-09 RX ORDER — VALSARTAN AND HYDROCHLOROTHIAZIDE 160; 25 MG/1; MG/1
1 TABLET ORAL DAILY
Qty: 90 TABLET | Refills: 1 | Status: SHIPPED | OUTPATIENT
Start: 2020-11-09 | End: 2021-06-14

## 2020-11-09 RX ORDER — LEVOTHYROXINE SODIUM 50 MCG
TABLET ORAL
Qty: 90 TABLET | Refills: 0 | Status: SHIPPED | OUTPATIENT
Start: 2020-11-09 | End: 2020-11-09

## 2020-11-09 RX ORDER — FOLIC ACID 1 MG/1
TABLET ORAL
Qty: 90 TABLET | Refills: 0 | Status: SHIPPED | OUTPATIENT
Start: 2020-11-09 | End: 2021-02-04

## 2020-11-09 RX ORDER — LEVOTHYROXINE SODIUM 50 MCG
TABLET ORAL
Qty: 90 TABLET | Refills: 0 | Status: SHIPPED | OUTPATIENT
Start: 2020-11-09 | End: 2021-03-15

## 2020-11-09 NOTE — TELEPHONE ENCOUNTER
Medication(s) to Refill:   Requested Prescriptions     Pending Prescriptions Disp Refills   • FOLIC ACID 1 MG Oral Tab [Pharmacy Med Name: FOLIC ACID 1MG TABLETS] 90 tablet 0     Sig: TAKE 1 TABLET BY MOUTH EVERY DAY   • SYNTHROID 50 MCG Oral Tab [Pharmacy

## 2020-12-04 ENCOUNTER — MED REC SCAN ONLY (OUTPATIENT)
Dept: FAMILY MEDICINE CLINIC | Facility: CLINIC | Age: 85
End: 2020-12-04

## 2020-12-16 ENCOUNTER — TELEPHONE (OUTPATIENT)
Dept: FAMILY MEDICINE CLINIC | Facility: CLINIC | Age: 85
End: 2020-12-16

## 2020-12-16 NOTE — TELEPHONE ENCOUNTER
Spoke to pt and she said she had an MRI done thru her rheumatologist     Dr. Shashi Flores she will call their office and have them fax copy of MRI to our office to review with Dr. Rachel Lomax

## 2020-12-16 NOTE — TELEPHONE ENCOUNTER
Pt calling in, wanting to know who Dr. Alina Perla recommends her to see for her head and her neck.  Please advise

## 2020-12-28 ENCOUNTER — OFFICE VISIT (OUTPATIENT)
Dept: FAMILY MEDICINE CLINIC | Facility: CLINIC | Age: 85
End: 2020-12-28
Payer: COMMERCIAL

## 2020-12-28 VITALS
BODY MASS INDEX: 29.97 KG/M2 | TEMPERATURE: 97 F | DIASTOLIC BLOOD PRESSURE: 72 MMHG | HEIGHT: 66 IN | SYSTOLIC BLOOD PRESSURE: 146 MMHG | OXYGEN SATURATION: 99 % | WEIGHT: 186.5 LBS | RESPIRATION RATE: 18 BRPM | HEART RATE: 71 BPM

## 2020-12-28 DIAGNOSIS — M51.36 DDD (DEGENERATIVE DISC DISEASE), LUMBAR: Primary | ICD-10-CM

## 2020-12-28 DIAGNOSIS — E04.1 THYROID NODULE: ICD-10-CM

## 2020-12-28 DIAGNOSIS — G89.29 CHRONIC NECK PAIN: ICD-10-CM

## 2020-12-28 DIAGNOSIS — M79.605 LEFT LEG PAIN: ICD-10-CM

## 2020-12-28 DIAGNOSIS — M54.2 CHRONIC NECK PAIN: ICD-10-CM

## 2020-12-28 PROCEDURE — 99214 OFFICE O/P EST MOD 30 MIN: CPT | Performed by: FAMILY MEDICINE

## 2020-12-28 PROCEDURE — 3077F SYST BP >= 140 MM HG: CPT | Performed by: FAMILY MEDICINE

## 2020-12-28 PROCEDURE — 3078F DIAST BP <80 MM HG: CPT | Performed by: FAMILY MEDICINE

## 2020-12-28 PROCEDURE — 3008F BODY MASS INDEX DOCD: CPT | Performed by: FAMILY MEDICINE

## 2020-12-28 RX ORDER — SODIUM FLUORIDE 5 MG/ML
PASTE, DENTIFRICE DENTAL
COMMUNITY
Start: 2020-09-17

## 2020-12-28 NOTE — PATIENT INSTRUCTIONS
1. Complete left leg ultrasound. 2. Schedule thyroid ultrasound     3. Arrange for care with physical therapy for chronic neck pain. 4. Start flonase and saline nasal spray (or jemal pot) for sinus congestion.  Also can use zyrtec or claritin as nee

## 2020-12-28 NOTE — PROGRESS NOTES
Avinash Carpenter is a 82year old female who presents for a pre-operative physical exam.   HPI related to surgery:   Avinash Carpenter is scheduled for a lumbar spinal fusion procedure L5-S1 at [facility] on 1/27/21 to be performed by Dr Teo Lemus.      Ind Performed by Camelia Negrete MD at [facility]   • COLONOSCOPY     • CYSTO, WITH INSERTION OF STENT Right 9/2/2015    Performed by Maryann Pritchard MD at [address] TUMOR W/ MITOMYCIN N/A 12/28/201 50 MG Oral Tablet 24 Hr TAKE 1 TABLET(50 MG) BY MOUTH DAILY 90 tablet 1   • SIMVASTATIN 20 MG Oral Tab TAKE 1 TABLET BY MOUTH EVERY EVENING 90 tablet 1   • FLUOXETINE HCL 20 MG Oral Cap TAKE 1 CAPSULE BY MOUTH ONCE DAILY 90 capsule 1   • Albuterol Sulfate 99 %.  General: No acute distress. Alert and oriented x 3. HEENT: Moist mucous membranes. PERRL  Neck: No lymphadenopathy. No JVD. No carotid bruits. Respiratory: Clear to auscultation bilaterally. No wheezes. No rhonchi.   Cardiovascular: S1, S2.  Regu

## 2020-12-30 ENCOUNTER — TELEPHONE (OUTPATIENT)
Dept: FAMILY MEDICINE CLINIC | Facility: CLINIC | Age: 85
End: 2020-12-30

## 2020-12-30 NOTE — TELEPHONE ENCOUNTER
Pre-Op clearance was faxed on 12/30 to [facility]. Success confirmation received. Nothing further/ closing encounter.

## 2021-01-06 ENCOUNTER — TELEPHONE (OUTPATIENT)
Dept: PHYSICAL THERAPY | Facility: HOSPITAL | Age: 86
End: 2021-01-06

## 2021-01-07 ENCOUNTER — OFFICE VISIT (OUTPATIENT)
Dept: PHYSICAL THERAPY | Age: 86
End: 2021-01-07
Attending: FAMILY MEDICINE
Payer: MEDICARE

## 2021-01-07 DIAGNOSIS — G89.29 CHRONIC NECK PAIN: ICD-10-CM

## 2021-01-07 DIAGNOSIS — M54.2 CHRONIC NECK PAIN: ICD-10-CM

## 2021-01-07 PROCEDURE — 97140 MANUAL THERAPY 1/> REGIONS: CPT

## 2021-01-07 PROCEDURE — 97110 THERAPEUTIC EXERCISES: CPT

## 2021-01-07 PROCEDURE — 97161 PT EVAL LOW COMPLEX 20 MIN: CPT

## 2021-01-07 NOTE — PROGRESS NOTES
SPINE EVALUATION:   Referring Physician: Dr. 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   Pt denies diplopia, dysarthria, dysphasia, dizziness, drop attack R side MOD tension but MIN tenderness.      Flexibility:   UE/Scapular   Upper Trap: R MAX; L MAX  Levator Scap: R MAX; L MAX  Pec Major: R MOD; L MOD  Scalenes: R MOD, L MAX     Special tests:   Cervical manual distraction: no change    Today’s Treatment a severity of impairments and chronicity of symptoms    FOTO: 28/100    Patient/Family/Caregiver was advised of these findings, precautions, and treatment options and has agreed to actively participate in planning and for this course of care.     Thank you fo

## 2021-01-09 ENCOUNTER — MED REC SCAN ONLY (OUTPATIENT)
Dept: FAMILY MEDICINE CLINIC | Facility: CLINIC | Age: 86
End: 2021-01-09

## 2021-01-12 ENCOUNTER — OFFICE VISIT (OUTPATIENT)
Dept: PHYSICAL THERAPY | Age: 86
End: 2021-01-12
Attending: FAMILY MEDICINE
Payer: MEDICARE

## 2021-01-12 ENCOUNTER — APPOINTMENT (OUTPATIENT)
Dept: PHYSICAL THERAPY | Age: 86
End: 2021-01-12
Attending: FAMILY MEDICINE
Payer: MEDICARE

## 2021-01-12 PROCEDURE — 97110 THERAPEUTIC EXERCISES: CPT

## 2021-01-12 PROCEDURE — 97140 MANUAL THERAPY 1/> REGIONS: CPT

## 2021-01-12 NOTE — PROGRESS NOTES
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 Davonna Schirmer Next MD visit: lumbar spinal fusion procedure L5-S1 at [facility] on 1/27/21  TX#: 3/ Date:                 TX#: 4/ Date:                 TX#: 5/ Date:    Tx#: 6/   Therex  UBE L1 x3 min  cervical retraction x10  scapular retraction x10  Cervical AROM ROT x5 ea  -flex x5  LS stretch 2x10s ea        Neuro Re-ed  Deep neck

## 2021-01-13 ENCOUNTER — OFFICE VISIT (OUTPATIENT)
Dept: PHYSICAL THERAPY | Age: 86
End: 2021-01-13
Attending: FAMILY MEDICINE
Payer: MEDICARE

## 2021-01-13 PROCEDURE — 97110 THERAPEUTIC EXERCISES: CPT

## 2021-01-13 PROCEDURE — 97140 MANUAL THERAPY 1/> REGIONS: CPT

## 2021-01-13 NOTE — PROGRESS NOTES
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 Jame Alvarado  Next MD visit: lumbar spinal fusion procedure L5-S1 at [facility] on 1/27/21  TX#: 4/ Date:                 TX#: 5/ Date:    Tx#: 6/   Therex  UBE L1 x3 min  cervical retraction x10  scapular retraction x10  Cervical AROM ROT x5 ea  -flex x5  LS stretch 2x10s ea  Therex  UBE L1 x3 min  cervical retraction x10  scapular retraction x

## 2021-01-14 ENCOUNTER — OFFICE VISIT (OUTPATIENT)
Dept: PHYSICAL THERAPY | Age: 86
End: 2021-01-14
Attending: FAMILY MEDICINE
Payer: MEDICARE

## 2021-01-14 PROCEDURE — 97140 MANUAL THERAPY 1/> REGIONS: CPT

## 2021-01-14 PROCEDURE — 97110 THERAPEUTIC EXERCISES: CPT

## 2021-01-14 NOTE — PROGRESS NOTES
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 Dinh Hightower MD visit: lumbar spinal fusion procedure L5-S1 at [facility] on 1/27/21  6/   Therex  UBE L1 x3 min  cervical retraction x10  scapular retraction x10  Cervical AROM ROT x5 ea  -flex x5  LS stretch 2x10s ea  Therex  UBE L1 x3 min  cervical retraction x10  scapular retraction x10  Cervical AROM ROT x5 ea  -flex x5  LS stretch 2x1

## 2021-01-18 ENCOUNTER — HOSPITAL ENCOUNTER (OUTPATIENT)
Dept: ULTRASOUND IMAGING | Facility: HOSPITAL | Age: 86
Discharge: HOME | End: 2021-01-18
Attending: FAMILY MEDICINE
Payer: MEDICARE

## 2021-01-18 ENCOUNTER — TELEPHONE (OUTPATIENT)
Dept: FAMILY MEDICINE CLINIC | Facility: CLINIC | Age: 86
End: 2021-01-18

## 2021-01-18 ENCOUNTER — OFFICE VISIT (OUTPATIENT)
Dept: PHYSICAL THERAPY | Age: 86
End: 2021-01-18
Attending: FAMILY MEDICINE
Payer: MEDICARE

## 2021-01-18 DIAGNOSIS — M79.89 PAIN AND SWELLING OF LEFT LOWER LEG: Primary | ICD-10-CM

## 2021-01-18 DIAGNOSIS — M79.662 PAIN AND SWELLING OF LEFT LOWER LEG: Primary | ICD-10-CM

## 2021-01-18 DIAGNOSIS — M79.662 PAIN AND SWELLING OF LEFT LOWER LEG: ICD-10-CM

## 2021-01-18 DIAGNOSIS — M79.89 PAIN AND SWELLING OF LEFT LOWER LEG: ICD-10-CM

## 2021-01-18 DIAGNOSIS — E04.1 THYROID NODULE: ICD-10-CM

## 2021-01-18 PROCEDURE — 97110 THERAPEUTIC EXERCISES: CPT

## 2021-01-18 PROCEDURE — 93971 EXTREMITY STUDY: CPT | Performed by: FAMILY MEDICINE

## 2021-01-18 PROCEDURE — 97140 MANUAL THERAPY 1/> REGIONS: CPT

## 2021-01-18 PROCEDURE — 76536 US EXAM OF HEAD AND NECK: CPT | Performed by: FAMILY MEDICINE

## 2021-01-18 NOTE — TELEPHONE ENCOUNTER
Pt is currently at [facility], called to inform you that pt's left calf is more swollen now, has 2+ edema & tenderness  Pt has her Venous Doppler scheduled for tomorrow, this was ordered at [address] [place] [address] 12/28. Pt has h/o DVT.   She is asking if y

## 2021-01-18 NOTE — TELEPHONE ENCOUNTER
LLE Doppler order placed for stat. Pt notified of recommendation to try to get US done today/stat order d/t worsening leg swelling. Pt states she will have to contact her dtr to drive her. # given for Central Scheduling to schedule US sooner.   VÍCTOR morley

## 2021-01-18 NOTE — PROGRESS NOTES
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 Zoila Mcclellan  Next MD visit: lumbar spinal fusion procedure L5-S1 at [facility] on 1/27/21  office) SANAZ Montelongo DVT.        Goals: (to be met in 10 visits)   · Pt will improve cervical AROM extension by 10 degrees to improve tolerance for putting dishes into overhead cabinets - MET  · Pt will improve cervical AROM rotation to >40 degrees to impr scar seated x15 min  Supine L>R scalenes, SCM STM x5 min   C2-7 Gr II rotation glides 2x20s ea R/L  Cervical distraction 2x20s   stretching to UT and LS 2x15s ea R/L  Manual Therapy  R LE LAD -HELD  R LE sciatic N flossing manual -HELD  STM ADDI UT, LS, cer

## 2021-01-20 ENCOUNTER — OFFICE VISIT (OUTPATIENT)
Dept: PHYSICAL THERAPY | Age: 86
End: 2021-01-20
Attending: FAMILY MEDICINE
Payer: MEDICARE

## 2021-01-20 PROCEDURE — 97110 THERAPEUTIC EXERCISES: CPT

## 2021-01-20 PROCEDURE — 97140 MANUAL THERAPY 1/> REGIONS: CPT

## 2021-01-20 NOTE — PROGRESS NOTES
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 David Piña  Next MD visit: lumbar spinal fusion procedure L5-S1 at [facility] on 1/27/21  headaches to <2x/week  · Pt will be independent and compliant with comprehensive HEP to maintain progress achieved in PT     Plan:  continue 1 more visit prior to low back surgery; hold while recovering at home; then resume as appropriate  Date: 1/12/2021 stretch UT and LS 2x15s ea R/L    C2-7 Gr II rotation glides 2x20s ea R/L  Cervical distraction 2x20s   C1-2 AP glides Gr II 3x20s ea Manual Therapy  R LE LAD -3x30s  R LE sciatic N flossing manual -x3 min  STM seated ADDI UT, LS, scalenes, scar seated x15

## 2021-01-21 ENCOUNTER — APPOINTMENT (OUTPATIENT)
Dept: PHYSICAL THERAPY | Age: 86
End: 2021-01-21
Payer: MEDICARE

## 2021-01-21 ENCOUNTER — OFFICE VISIT (OUTPATIENT)
Dept: PHYSICAL THERAPY | Age: 86
End: 2021-01-21
Attending: FAMILY MEDICINE
Payer: MEDICARE

## 2021-01-21 ENCOUNTER — TELEPHONE (OUTPATIENT)
Dept: FAMILY MEDICINE CLINIC | Facility: CLINIC | Age: 86
End: 2021-01-21

## 2021-01-21 DIAGNOSIS — E04.1 THYROID NODULE: Primary | ICD-10-CM

## 2021-01-21 PROCEDURE — 97140 MANUAL THERAPY 1/> REGIONS: CPT

## 2021-01-21 PROCEDURE — 97110 THERAPEUTIC EXERCISES: CPT

## 2021-01-21 NOTE — TELEPHONE ENCOUNTER
Pt notified that Dr. Sandra Wagner agreed to fill out her dtr's Int FMLA to care for her, she will let her dtr know & have her drop off forms.

## 2021-01-21 NOTE — PROGRESS NOTES
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 Krystal Bolaños  Next MD visit: lumbar spinal fusion procedure L5-S1 at [facility] on 1/27/21  TX#: 5/10 Date: 1/20/2021   Tx#: 6/10 Date: 1/21/2021  Tx#: 7/10   Therex  UBE L1 x3 min  cervical retraction x10  scapular retraction x10  Cervical AROM ROT x5 ea  -flex x5  LS stretch 2x10s ea  Therex  UBE L1 x3 min  cervical retraction x10  scapular scar seated x15 min  STM cervical paraspinals, suboccipitals (with TPR), SCM x9 min  Pin and stretch UT and LS 2x15s ea R/L    C2-7 Gr II rotation glides 2x20s ea R/L  Cervical distraction 2x20s   C1-2 AP glides Gr II 3x20s ea Manual Therapy  R LE LAD -3x3

## 2021-01-21 NOTE — TELEPHONE ENCOUNTER
Pt notified of below orders & US results. Pt is asking if you can complete her dtr's intermittent FMLA so she can help pt & take her to appts after her Spinal Fusion on 1/27.   She already asked the surgeon & was told they would only do it giving her 2 con

## 2021-01-21 NOTE — TELEPHONE ENCOUNTER
Pt calling in, stating that she is having neck surgery on Wednesday. Pt states that she has the opportunity to get the covid vaccine the morning of 01/22/2021. Pt is wanting to know if this is a good idea since she has pending surgery.  Please advise

## 2021-01-21 NOTE — TELEPHONE ENCOUNTER
----- Message from Gillian Moses MD sent at 1/20/2021  9:54 AM CST -----  Results reviewed. Small nodule, will repeat thyroid US in 6mo to monitor size.

## 2021-01-21 NOTE — TELEPHONE ENCOUNTER
Pt having Lumbar Fusion on 1/27 & has opportunity to receive COVID Vaccine on 1/22, she is asking if you think that is OK to get Vaccine.

## 2021-01-22 ENCOUNTER — APPOINTMENT (OUTPATIENT)
Dept: PHYSICAL THERAPY | Age: 86
End: 2021-01-22
Payer: MEDICARE

## 2021-02-01 ENCOUNTER — TELEPHONE (OUTPATIENT)
Dept: FAMILY MEDICINE CLINIC | Facility: CLINIC | Age: 86
End: 2021-02-01

## 2021-02-01 NOTE — TELEPHONE ENCOUNTER
Pt's son in law came in a dropped off la paperwork. Ok'd by triage. Copy made and placed in fax room. Original given to triage. VIKKI filled out.

## 2021-02-04 RX ORDER — FOLIC ACID 1 MG/1
TABLET ORAL
Qty: 90 TABLET | Refills: 0 | Status: SHIPPED | OUTPATIENT
Start: 2021-02-04 | End: 2021-08-24

## 2021-02-04 NOTE — TELEPHONE ENCOUNTER
Per Dr. Ferny Wood, for intermittent FMLA, she will only give dtr 2 hours daily for 5 days/week for 1 month to help care for pt.  8 hours for 5 days a week is continuous & she needs to get that from Surgeon.  Message left for dtr, Jayesh Florence to call office back t

## 2021-02-04 NOTE — TELEPHONE ENCOUNTER
Pts daughter would like phone call when fmla is ready, also wants paperwork to say up to 8 hrs a day and 5 days a week.

## 2021-02-05 NOTE — TELEPHONE ENCOUNTER
I spoke with Sharonda Yañez, she states she is working out of the home while she stays with pt after her lumbar spinal fusion procedure L5-S1 [facility] on 1/27/21.   She is working in between helping her Mom with ADL's & takes her to doctor's appts &

## 2021-02-05 NOTE — TELEPHONE ENCOUNTER
Daughter Bigg Ge called back to discuss the terms of the FMLA     Please call back     She mentioned she is close to being fired for being off

## 2021-02-06 ENCOUNTER — MED REC SCAN ONLY (OUTPATIENT)
Dept: FAMILY MEDICINE CLINIC | Facility: CLINIC | Age: 86
End: 2021-02-06

## 2021-02-09 NOTE — TELEPHONE ENCOUNTER
I informed dtr that FMLA is completed & she requests it be faxed back to [facility]. Paperwork faxed to [facility] at [phone], confirmation received. Pt is currently in [facility] with multiple PE's & bilat DVT's.   She is doing OK except for some SOB

## 2021-02-09 NOTE — TELEPHONE ENCOUNTER
Daughter called checking on [facility] paperwork. She also stated pt is in [facility] right now for blood clots. Daughter says pt feels she isn't getting the best care and would possibly want to be transferred to [facility]. Pls call daughter.

## 2021-02-10 ENCOUNTER — TELEPHONE (OUTPATIENT)
Dept: FAMILY MEDICINE CLINIC | Facility: CLINIC | Age: 86
End: 2021-02-10

## 2021-02-10 NOTE — TELEPHONE ENCOUNTER
Daughter Mansi Lozada lvm about fmla form. Mansi Lozada states that the form is missing 1 week being circled for 5 days a  Week up to 1 week at a time.  Pls call didier [phone]

## 2021-02-10 NOTE — TELEPHONE ENCOUNTER
Spoke to [facility] and she said there is one part that says 8 hours for 5 days/week.   Word week is missing  Found [facility] original forms and circled the word week and faxed back to [phone]      Sylvester   [phone]  #[phone]

## 2021-03-02 ENCOUNTER — OFFICE VISIT (OUTPATIENT)
Dept: FAMILY MEDICINE CLINIC | Facility: CLINIC | Age: 86
End: 2021-03-02
Payer: COMMERCIAL

## 2021-03-02 ENCOUNTER — LAB ENCOUNTER (OUTPATIENT)
Dept: LAB | Age: 86
End: 2021-03-02
Attending: NURSE PRACTITIONER
Payer: MEDICARE

## 2021-03-02 ENCOUNTER — MED REC SCAN ONLY (OUTPATIENT)
Dept: FAMILY MEDICINE CLINIC | Facility: CLINIC | Age: 86
End: 2021-03-02

## 2021-03-02 VITALS
RESPIRATION RATE: 20 BRPM | WEIGHT: 193 LBS | DIASTOLIC BLOOD PRESSURE: 38 MMHG | BODY MASS INDEX: 31.02 KG/M2 | OXYGEN SATURATION: 98 % | HEIGHT: 66 IN | SYSTOLIC BLOOD PRESSURE: 112 MMHG | TEMPERATURE: 97 F | HEART RATE: 80 BPM

## 2021-03-02 DIAGNOSIS — I10 ESSENTIAL HYPERTENSION, BENIGN: ICD-10-CM

## 2021-03-02 DIAGNOSIS — D64.9 ANEMIA, UNSPECIFIED TYPE: Primary | ICD-10-CM

## 2021-03-02 DIAGNOSIS — D50.0 IRON DEFICIENCY ANEMIA SECONDARY TO BLOOD LOSS (CHRONIC): ICD-10-CM

## 2021-03-02 DIAGNOSIS — D64.9 ANEMIA, UNSPECIFIED TYPE: ICD-10-CM

## 2021-03-02 DIAGNOSIS — R73.09 ABNORMAL GLUCOSE: ICD-10-CM

## 2021-03-02 DIAGNOSIS — I26.94 MULTIPLE SUBSEGMENTAL PULMONARY EMBOLI WITHOUT ACUTE COR PULMONALE (HCC): ICD-10-CM

## 2021-03-02 DIAGNOSIS — R73.09 ABNORMAL GLUCOSE: Primary | ICD-10-CM

## 2021-03-02 DIAGNOSIS — I27.82 CHRONIC PULMONARY EMBOLISM, UNSPECIFIED PULMONARY EMBOLISM TYPE, UNSPECIFIED WHETHER ACUTE COR PULMONALE PRESENT (HCC): ICD-10-CM

## 2021-03-02 DIAGNOSIS — I95.9 HYPOTENSION, UNSPECIFIED HYPOTENSION TYPE: ICD-10-CM

## 2021-03-02 DIAGNOSIS — I82.403 ACUTE DEEP VEIN THROMBOSIS (DVT) OF BOTH LOWER EXTREMITIES, UNSPECIFIED VEIN (HCC): ICD-10-CM

## 2021-03-02 LAB
ALBUMIN SERPL-MCNC: 3 G/DL (ref 3.4–5)
ALBUMIN/GLOB SERPL: 0.9 {RATIO} (ref 1–2)
ALP LIVER SERPL-CCNC: 81 U/L
ALT SERPL-CCNC: 14 U/L
ANION GAP SERPL CALC-SCNC: 5 MMOL/L (ref 0–18)
AST SERPL-CCNC: 15 U/L (ref 15–37)
BASOPHILS # BLD AUTO: 0.05 X10(3) UL (ref 0–0.2)
BASOPHILS NFR BLD AUTO: 0.6 %
BILIRUB SERPL-MCNC: 0.3 MG/DL (ref 0.1–2)
BUN BLD-MCNC: 24 MG/DL (ref 7–18)
BUN/CREAT SERPL: 20.7 (ref 10–20)
CALCIUM BLD-MCNC: 8.9 MG/DL (ref 8.5–10.1)
CHLORIDE SERPL-SCNC: 112 MMOL/L (ref 98–112)
CO2 SERPL-SCNC: 26 MMOL/L (ref 21–32)
CREAT BLD-MCNC: 1.16 MG/DL
DEPRECATED HBV CORE AB SER IA-ACNC: 516.7 NG/ML
DEPRECATED RDW RBC AUTO: 54.2 FL (ref 35.1–46.3)
EOSINOPHIL # BLD AUTO: 0.35 X10(3) UL (ref 0–0.7)
EOSINOPHIL NFR BLD AUTO: 4 %
ERYTHROCYTE [DISTWIDTH] IN BLOOD BY AUTOMATED COUNT: 15.1 % (ref 11–15)
EST. AVERAGE GLUCOSE BLD GHB EST-MCNC: 114 MG/DL (ref 68–126)
GLOBULIN PLAS-MCNC: 3.4 G/DL (ref 2.8–4.4)
GLUCOSE BLD-MCNC: 126 MG/DL (ref 70–99)
HBA1C MFR BLD HPLC: 5.6 % (ref ?–5.7)
HCT VFR BLD AUTO: 30 %
HGB BLD-MCNC: 9.2 G/DL
IMM GRANULOCYTES # BLD AUTO: 0.06 X10(3) UL (ref 0–1)
IMM GRANULOCYTES NFR BLD: 0.7 %
IRON SATURATION: 20 %
IRON SERPL-MCNC: 72 UG/DL
LYMPHOCYTES # BLD AUTO: 1.59 X10(3) UL (ref 1–4)
LYMPHOCYTES NFR BLD AUTO: 18.2 %
M PROTEIN MFR SERPL ELPH: 6.4 G/DL (ref 6.4–8.2)
MCH RBC QN AUTO: 30.6 PG (ref 26–34)
MCHC RBC AUTO-ENTMCNC: 30.7 G/DL (ref 31–37)
MCV RBC AUTO: 99.7 FL
MONOCYTES # BLD AUTO: 0.81 X10(3) UL (ref 0.1–1)
MONOCYTES NFR BLD AUTO: 9.2 %
NEUTROPHILS # BLD AUTO: 5.9 X10 (3) UL (ref 1.5–7.7)
NEUTROPHILS # BLD AUTO: 5.9 X10(3) UL (ref 1.5–7.7)
NEUTROPHILS NFR BLD AUTO: 67.3 %
OSMOLALITY SERPL CALC.SUM OF ELEC: 302 MOSM/KG (ref 275–295)
PATIENT FASTING Y/N/NP: YES
PLATELET # BLD AUTO: 248 10(3)UL (ref 150–450)
POTASSIUM SERPL-SCNC: 3.9 MMOL/L (ref 3.5–5.1)
RBC # BLD AUTO: 3.01 X10(6)UL
SODIUM SERPL-SCNC: 143 MMOL/L (ref 136–145)
TOTAL IRON BINDING CAPACITY: 368 UG/DL (ref 240–450)
TRANSFERRIN SERPL-MCNC: 247 MG/DL (ref 200–360)
WBC # BLD AUTO: 8.8 X10(3) UL (ref 4–11)

## 2021-03-02 PROCEDURE — 80053 COMPREHEN METABOLIC PANEL: CPT

## 2021-03-02 PROCEDURE — 83540 ASSAY OF IRON: CPT

## 2021-03-02 PROCEDURE — 85025 COMPLETE CBC W/AUTO DIFF WBC: CPT

## 2021-03-02 PROCEDURE — 3074F SYST BP LT 130 MM HG: CPT | Performed by: NURSE PRACTITIONER

## 2021-03-02 PROCEDURE — 36415 COLL VENOUS BLD VENIPUNCTURE: CPT

## 2021-03-02 PROCEDURE — 82728 ASSAY OF FERRITIN: CPT

## 2021-03-02 PROCEDURE — 3078F DIAST BP <80 MM HG: CPT | Performed by: NURSE PRACTITIONER

## 2021-03-02 PROCEDURE — 83550 IRON BINDING TEST: CPT

## 2021-03-02 PROCEDURE — 83036 HEMOGLOBIN GLYCOSYLATED A1C: CPT

## 2021-03-02 PROCEDURE — 99214 OFFICE O/P EST MOD 30 MIN: CPT | Performed by: NURSE PRACTITIONER

## 2021-03-02 PROCEDURE — 3008F BODY MASS INDEX DOCD: CPT | Performed by: NURSE PRACTITIONER

## 2021-03-02 NOTE — PROGRESS NOTES
Patient presents with:  Hospital F/U         HPI:         Hospital follow up :  Back surgery at [facility] on 1/27/21 -Discharged 1/30/21 on [place]. On  2/7/21 was taken to [facility] for dyspnea and lightheadedness.  Admitted for P MCG Oral Tab TAKE 1 TABLET BY MOUTH DAILY 90 tablet 0   • VALSARTAN-HYDROCHLOROTHIAZIDE 160-25 MG Oral Tab TAKE 1 TABLET BY MOUTH DAILY 90 tablet 1   • METOPROLOL SUCCINATE ER 50 MG Oral Tablet 24 Hr TAKE 1 TABLET(50 MG) BY MOUTH DAILY 90 tablet 1   • SIMV TUMOR W/ MITOMYCIN Right 9/2/2015    Performed by Malu Braswell MD at [address] [place] [address] 110 Right 12/28/2015    Performed by Malu Braswell MD at . Yael Nails  nodes.  MUSC/SKEL: no joint swelling,no no joint stiffness. CARDIOVASCULAR: denies chest pain on exertion or rest.  GI: no nausea, no vomiting or abdominal pain  NEURO: no abnormal sensation, no tingling of the skin or numbness.       EXAM:   Pulse 80   Te within 2 business days of discharge on date above      Medical Decision Making- Based on service period of discharge to 30 days:   · Number of Possible Diagnoses and/or Management Options: moderate  · Amount and/or Complexity of Data to Be Reviewed: Mumtaz Santiago

## 2021-03-02 NOTE — PATIENT INSTRUCTIONS
Anemia  Anemia is a condition that occurs when your body does not have enough healthy red blood cells (RBCs). RBCs are the parts of your blood that carry oxygen all over your body.  A protein called hemoglobin allows your RBCs to absorb and release oxygen · Blood smear. This test checks the size and shape of your blood cells. To do the test, a drop of your blood is looked at under a microscope. A stain is used to make the blood cells easier to see. · Iron studies.  These tests measure the amount of iron in © 7915-3008 [employer] [address]. All rights reserved. This information is not intended as a substitute for professional medical care. Always follow your healthcare professional's instructions.         Checking Your Blood Pressure  Step-by-Step    Stay Take your blood thinner exactly as directed. If you miss a dose, call your healthcare provider to find out what you should do. These medicines increase the chance of bleeding.  So it's very important to take them correctly. Be sure to tell all of your healt · Asparagus  · Egg yolks  · Oils like canola, olive, and soybean  When taking warfarin, don't change your diet without first checking with your healthcare provider.   The other blood thinners don't have the same interaction with vitamin K that warfarin does · Rest and put your legs up whenever they feel swollen or heavy. · Raise the foot of your mattress 5 to 6 inches, using a foam wedge.   Lifestyle changes  To help you stay healthy, especially your heart and blood vessels, you should:  · Start an exercise p

## 2021-03-03 ENCOUNTER — TELEPHONE (OUTPATIENT)
Dept: FAMILY MEDICINE CLINIC | Facility: CLINIC | Age: 86
End: 2021-03-03

## 2021-03-03 NOTE — TELEPHONE ENCOUNTER
----- Message from LADONNA Herron sent at 3/3/2021  9:43 AM CST -----  Hemoglobin improved    Spoke to pt with results. She also said that she was told to stop the blood pressure med at yesterday's visit?   She bought a BP cuff and has been monitoring

## 2021-03-03 NOTE — TELEPHONE ENCOUNTER
Spoke to pt about the BP med instructions. Eulis Nissen,  What about the celebrex? Pt wanted to know if ok to continue because of her hemoglobin?

## 2021-03-04 RX ORDER — SIMVASTATIN 20 MG
20 TABLET ORAL EVERY EVENING
Qty: 90 TABLET | Refills: 1 | Status: SHIPPED | OUTPATIENT
Start: 2021-03-04 | End: 2021-08-31

## 2021-03-04 NOTE — TELEPHONE ENCOUNTER
Celebrex and Eliquis increases risk for GI bleeding -  She should wait after GI appointment  and r/o any GI bleeds

## 2021-03-04 NOTE — TELEPHONE ENCOUNTER
Medication(s) to Refill:   Requested Prescriptions     Pending Prescriptions Disp Refills   • simvastatin 20 MG Oral Tab 90 tablet 1     Sig: Take 1 tablet (20 mg total) by mouth every evening.          Reason for Medication Refill being sent to Provider /

## 2021-03-09 DIAGNOSIS — Z23 NEED FOR VACCINATION: ICD-10-CM

## 2021-03-10 ENCOUNTER — APPOINTMENT (OUTPATIENT)
Dept: HEMATOLOGY/ONCOLOGY | Age: 86
End: 2021-03-10
Attending: SPECIALIST
Payer: MEDICARE

## 2021-03-11 PROBLEM — I82.451 ACUTE DEEP VEIN THROMBOSIS (DVT) OF RIGHT PERONEAL VEIN (HCC): Status: ACTIVE | Noted: 2021-03-11

## 2021-03-11 PROBLEM — I26.92 ACUTE SADDLE PULMONARY EMBOLISM WITHOUT ACUTE COR PULMONALE (HCC): Status: ACTIVE | Noted: 2021-03-11

## 2021-03-11 PROBLEM — I82.442 ACUTE DEEP VEIN THROMBOSIS (DVT) OF LEFT TIBIAL VEIN (HCC): Status: ACTIVE | Noted: 2021-03-11

## 2021-03-15 ENCOUNTER — TELEPHONE (OUTPATIENT)
Dept: FAMILY MEDICINE CLINIC | Facility: CLINIC | Age: 86
End: 2021-03-15

## 2021-03-15 ENCOUNTER — OFFICE VISIT (OUTPATIENT)
Dept: FAMILY MEDICINE CLINIC | Facility: CLINIC | Age: 86
End: 2021-03-15
Payer: COMMERCIAL

## 2021-03-15 VITALS
OXYGEN SATURATION: 100 % | RESPIRATION RATE: 16 BRPM | BODY MASS INDEX: 31.87 KG/M2 | HEART RATE: 58 BPM | TEMPERATURE: 98 F | SYSTOLIC BLOOD PRESSURE: 124 MMHG | DIASTOLIC BLOOD PRESSURE: 60 MMHG | WEIGHT: 189 LBS | HEIGHT: 64.5 IN

## 2021-03-15 DIAGNOSIS — E03.9 ACQUIRED HYPOTHYROIDISM: ICD-10-CM

## 2021-03-15 DIAGNOSIS — M17.11 PRIMARY OSTEOARTHRITIS OF RIGHT KNEE: ICD-10-CM

## 2021-03-15 DIAGNOSIS — I27.82 CHRONIC PULMONARY EMBOLISM, UNSPECIFIED PULMONARY EMBOLISM TYPE, UNSPECIFIED WHETHER ACUTE COR PULMONALE PRESENT (HCC): ICD-10-CM

## 2021-03-15 DIAGNOSIS — F33.8 SEASONAL DEPRESSION (HCC): ICD-10-CM

## 2021-03-15 DIAGNOSIS — Z01.818 PREOP GENERAL PHYSICAL EXAM: Primary | ICD-10-CM

## 2021-03-15 PROCEDURE — 3074F SYST BP LT 130 MM HG: CPT | Performed by: NURSE PRACTITIONER

## 2021-03-15 PROCEDURE — 3078F DIAST BP <80 MM HG: CPT | Performed by: NURSE PRACTITIONER

## 2021-03-15 PROCEDURE — 3008F BODY MASS INDEX DOCD: CPT | Performed by: NURSE PRACTITIONER

## 2021-03-15 PROCEDURE — 99214 OFFICE O/P EST MOD 30 MIN: CPT | Performed by: NURSE PRACTITIONER

## 2021-03-15 RX ORDER — LEVOTHYROXINE SODIUM 50 MCG
50 TABLET ORAL DAILY
Qty: 90 TABLET | Refills: 0 | Status: SHIPPED | OUTPATIENT
Start: 2021-03-15 | End: 2021-06-22

## 2021-03-15 RX ORDER — MELATONIN
COMMUNITY
Start: 2021-03-12

## 2021-03-15 RX ORDER — METOPROLOL SUCCINATE 50 MG/1
50 TABLET, EXTENDED RELEASE ORAL DAILY
Qty: 90 TABLET | Refills: 1 | Status: SHIPPED | OUTPATIENT
Start: 2021-03-15 | End: 2021-11-01

## 2021-03-15 RX ORDER — FLUOXETINE HYDROCHLORIDE 20 MG/1
20 CAPSULE ORAL DAILY
Qty: 90 CAPSULE | Refills: 1 | Status: SHIPPED | OUTPATIENT
Start: 2021-03-15 | End: 2021-10-18

## 2021-03-15 NOTE — PATIENT INSTRUCTIONS
Depression  Depression is one of the most common mental health problems today. It is not just a state of unhappiness or sadness. It is a true disease. The cause seems to be linked to a drop in chemicals that transmit signals in the brain.  Having a family (low in saturated fat and high in fruits and vegetables). Exercise at least 3 times a week for 30 minutes. Even mild to moderate exercise (like brisk walking) can make you feel better.   · Don't make major decisions, such as a job change, a divorce, or a ma hear  · Seeing things that others do not see  · Not sleeping or eating for 3 days in a row  · Having friends or family express concern over your behavior and ask you to seek help  Jeremias last reviewed this educational content on 7/1/2020 © 2000-2020 The

## 2021-03-15 NOTE — TELEPHONE ENCOUNTER
MANJINDER updated & extended 1 more month through 4/27/21. I notified Betsy Ugalde of this, she requested I fax updated form back to [facility] Stephanie Zavalad. Forms faxed back to Yasmine at [phone]. Confirmation received.

## 2021-03-15 NOTE — TELEPHONE ENCOUNTER
Please see below message, did pt's dtr bring new FMLA paperwork at [address] today? Previous FMLA is scanned in Media on 2/23 if it just needs updating. Thank you.

## 2021-03-15 NOTE — TELEPHONE ENCOUNTER
Pt's daughter, Sedrick Pickard, called regarding the [place] paperwork. Pt had an appointment with Sharma Severin today and was told that her FMLA would be extended. She spoke with [facility] and was told that the end date could just be changed.  Please contact Sedrick Pickard and let her kno

## 2021-03-15 NOTE — PROGRESS NOTES
CC: Preop exam.    Fatemeh Flores is a 82year old female who presents for a pre-operative physical exam  By Dr. Guillory Last  request. Patient is to have Colonoscopy . No prior anaesthesia problem. Pt complains of intermittent shortness of breath.     Austin Samaniego Comment:hives  Codeine                 RASH   Past Medical History:   Diagnosis Date   • Arthritis     psoriatic   • Back problem    • [facility]    TURBT - Dr. Vazquez Lee   • Disorder of thyroid    • High blood pressure    • High Father    • Heart Disorder Mother    • Diabetes Mother    • Diabetes Sister    • Heart Disorder Sister    • Diabetes Brother       Social History:   Social History    Tobacco Use      Smoking status: Former Smoker        Packs/day: 1.00        Years: 44.00 intact    ASSESSMENT AND PLAN:   Ab Nicholas is a 82year old female who presents for a pre-operative physical exam. Labs stable,  patient is approved for Colonoscopy procedure .   Diagnoses and all orders for this visit:    Diagnoses and all orders for th

## 2021-04-15 ENCOUNTER — TELEPHONE (OUTPATIENT)
Dept: FAMILY MEDICINE CLINIC | Facility: CLINIC | Age: 86
End: 2021-04-15

## 2021-04-15 NOTE — TELEPHONE ENCOUNTER
Pt's daughter called and asked for [place] extension, please see TE 3/15/21. Pt still has appointments that she cannot drive to, Pt's daughter is asking for another month extension to be faxed to [facility].

## 2021-04-15 NOTE — TELEPHONE ENCOUNTER
Dtr is asking for 1 month extension of her FMLA to take care of pt. Pt still needs assistance & has OV. LOV 3/15. Ok to extend [employer]?

## 2021-04-16 RX ORDER — FUROSEMIDE 20 MG/1
20 TABLET ORAL DAILY
Qty: 30 TABLET | Refills: 3 | Status: SHIPPED | OUTPATIENT
Start: 2021-04-16 | End: 2021-05-27

## 2021-04-16 NOTE — TELEPHONE ENCOUNTER
Message left for Lela Campbell to call office back. FMLA extended through 5/27, FMLA faxed back to [address] at [phone], confirmation received.

## 2021-05-17 ENCOUNTER — OFFICE VISIT (OUTPATIENT)
Dept: FAMILY MEDICINE CLINIC | Facility: CLINIC | Age: 86
End: 2021-05-17
Payer: COMMERCIAL

## 2021-05-17 VITALS
SYSTOLIC BLOOD PRESSURE: 108 MMHG | RESPIRATION RATE: 18 BRPM | TEMPERATURE: 98 F | HEIGHT: 65 IN | HEART RATE: 82 BPM | OXYGEN SATURATION: 98 % | BODY MASS INDEX: 31.32 KG/M2 | WEIGHT: 188 LBS | DIASTOLIC BLOOD PRESSURE: 62 MMHG

## 2021-05-17 DIAGNOSIS — J41.0 SIMPLE CHRONIC BRONCHITIS (HCC): ICD-10-CM

## 2021-05-17 DIAGNOSIS — M47.816 LUMBAR ARTHROPATHY: ICD-10-CM

## 2021-05-17 DIAGNOSIS — E78.00 PURE HYPERCHOLESTEROLEMIA: ICD-10-CM

## 2021-05-17 DIAGNOSIS — J30.1 SEASONAL ALLERGIC RHINITIS DUE TO POLLEN: ICD-10-CM

## 2021-05-17 DIAGNOSIS — I82.451 ACUTE DEEP VEIN THROMBOSIS (DVT) OF RIGHT PERONEAL VEIN (HCC): ICD-10-CM

## 2021-05-17 DIAGNOSIS — F33.8 SEASONAL DEPRESSION (HCC): ICD-10-CM

## 2021-05-17 DIAGNOSIS — N18.30 STAGE 3 CHRONIC KIDNEY DISEASE, UNSPECIFIED WHETHER STAGE 3A OR 3B CKD (HCC): ICD-10-CM

## 2021-05-17 DIAGNOSIS — M17.0 PRIMARY OSTEOARTHRITIS OF BOTH KNEES: ICD-10-CM

## 2021-05-17 DIAGNOSIS — L40.9 PSORIASIS: ICD-10-CM

## 2021-05-17 DIAGNOSIS — E03.9 ACQUIRED HYPOTHYROIDISM: ICD-10-CM

## 2021-05-17 DIAGNOSIS — E55.9 VITAMIN D DEFICIENCY: Primary | ICD-10-CM

## 2021-05-17 DIAGNOSIS — L40.50 PSORIATIC ARTHRITIS (HCC): ICD-10-CM

## 2021-05-17 DIAGNOSIS — K21.9 GASTROESOPHAGEAL REFLUX DISEASE WITHOUT ESOPHAGITIS: ICD-10-CM

## 2021-05-17 DIAGNOSIS — M47.812 FACET ARTHRITIS OF CERVICAL REGION: ICD-10-CM

## 2021-05-17 DIAGNOSIS — I26.92 ACUTE SADDLE PULMONARY EMBOLISM WITHOUT ACUTE COR PULMONALE (HCC): ICD-10-CM

## 2021-05-17 DIAGNOSIS — I82.442 ACUTE DEEP VEIN THROMBOSIS (DVT) OF LEFT TIBIAL VEIN (HCC): ICD-10-CM

## 2021-05-17 DIAGNOSIS — I10 ESSENTIAL HYPERTENSION, BENIGN: ICD-10-CM

## 2021-05-17 PROBLEM — D50.8 IRON DEFICIENCY ANEMIA REFRACTORY TO IRON THERAPY: Status: RESOLVED | Noted: 2019-12-11 | Resolved: 2021-05-17

## 2021-05-17 PROBLEM — D50.0 IRON DEFICIENCY ANEMIA SECONDARY TO BLOOD LOSS (CHRONIC): Status: RESOLVED | Noted: 2019-12-11 | Resolved: 2021-05-17

## 2021-05-17 PROCEDURE — 99397 PER PM REEVAL EST PAT 65+ YR: CPT | Performed by: FAMILY MEDICINE

## 2021-05-17 PROCEDURE — 3008F BODY MASS INDEX DOCD: CPT | Performed by: FAMILY MEDICINE

## 2021-05-17 PROCEDURE — 3074F SYST BP LT 130 MM HG: CPT | Performed by: FAMILY MEDICINE

## 2021-05-17 PROCEDURE — 96160 PT-FOCUSED HLTH RISK ASSMT: CPT | Performed by: FAMILY MEDICINE

## 2021-05-17 PROCEDURE — 3078F DIAST BP <80 MM HG: CPT | Performed by: FAMILY MEDICINE

## 2021-05-17 PROCEDURE — G0439 PPPS, SUBSEQ VISIT: HCPCS | Performed by: FAMILY MEDICINE

## 2021-05-17 RX ORDER — ACETAMINOPHEN AND CODEINE PHOSPHATE 300; 30 MG/1; MG/1
1-2 TABLET ORAL NIGHTLY PRN
Qty: 30 TABLET | Refills: 0 | Status: SHIPPED | OUTPATIENT
Start: 2021-05-17 | End: 2021-05-27

## 2021-05-17 RX ORDER — PREDNISONE 20 MG/1
TABLET ORAL
Qty: 10 TABLET | Refills: 0 | Status: SHIPPED | OUTPATIENT
Start: 2021-05-17 | End: 2021-10-07 | Stop reason: ALTCHOICE

## 2021-05-17 NOTE — PROGRESS NOTES
REASON FOR VISIT:    Jeet Pearson is a 82year old female who presents for a MA Supervisit.     HPI:     Patient Care Team: Patient Care Team:  Peyton Orozco MD as PCP - General (Family Practice)  Isabella Gee MD as Consulting Physician (UROLOGY)  So (ELIQUIS) 5 MG Oral Tab Take 1 tablet (5 mg total) by mouth 2 (two) times daily.  708 tablet 1   • FOLIC ACID 1 MG Oral Tab TAKE 1 TABLET BY MOUTH EVERY DAY 90 tablet 0   • Secukinumab 150 MG/ML Subcutaneous Solution Prefilled Syringe Inject 300 mg (two syr might be hidden     AST and ALT Latest Ref Rng & Units 3/2/2021 2/22/2021 7/17/2020 8/30/2019 8/26/2019 6/20/2019 10/26/2018   AST 15 - 37 U/L 15 13 16 18 17 13(L) 16   ALT 13 - 56 U/L 14 7 25 24 23 21 20   Some recent data might be hidden     TSH and Free 6  Scoring Interpretation: 4+ At Risk     Depression Screening (PHQ-2/PHQ-9): Over the LAST 2 WEEKS         Depression Screening (PHQ-2/PHQ-9): Over the LAST 2 WEEKS   Little interest or pleasure in doing things: Not at all    Feeling down, depressed, or h Medications  (ACE/ARB, Digoxin, Diuretics)        Potassium  Annually Potassium (mmol/L)   Date Value   04/15/2021 4.41   12/01/2015 4.2       Creatinine  Annually Creatinine, Serum (mg/dL)   Date Value   12/01/2015 0.95     Creatinine (mg/dL)   Date Value OOPHORECTOMY  1/1/1974   • OTHER  1/1/99    gastrotomy with suture of bleeding ulcer   • OTHER SURGICAL HISTORY  12/1/15    [facility] Cysto James Medina   • OTHER SURGICAL HISTORY  4/19/16    [facility] Hector Medina   • OTHER SURGICAL HISTORY  7/12/16    [facility] Williano- 07/11/2011        SOCIAL HISTORY:   Social History    Tobacco Use      Smoking status: Former Smoker        Packs/day: 1.00        Years: 44.00        Pack years: 40        Types: Cigarettes        Start date: 1/22/1957        Quit date: 1/22/2001 cyanosis, clubbing or edema  NEURO: Oriented times three, cranial nerves are intact, motor and sensory are grossly intact      ASSESSMENT AND PLAN OF CHRONIC CONDITIONS:   Gagan Lazcano is a 82year old female who presents for a Medicare Assessment.      Sravan

## 2021-05-18 ENCOUNTER — MED REC SCAN ONLY (OUTPATIENT)
Dept: FAMILY MEDICINE CLINIC | Facility: CLINIC | Age: 86
End: 2021-05-18

## 2021-05-20 ENCOUNTER — TELEPHONE (OUTPATIENT)
Dept: ORTHOPEDICS CLINIC | Facility: CLINIC | Age: 86
End: 2021-05-20

## 2021-05-20 DIAGNOSIS — M25.561 RIGHT KNEE PAIN, UNSPECIFIED CHRONICITY: Primary | ICD-10-CM

## 2021-05-20 DIAGNOSIS — M25.562 LEFT KNEE PAIN, UNSPECIFIED CHRONICITY: ICD-10-CM

## 2021-05-20 NOTE — TELEPHONE ENCOUNTER
Patient is scheduled on 06/10 at 10am for ADDI Knees. No imaging done yet. Please create an xray order for patient's appt. Advised patient to come in early prior to appt. Thanks.     Patient can be reached at [phone]

## 2021-05-20 NOTE — TELEPHONE ENCOUNTER
Reviewed patients chart, xray orders are required. Order placed.   Future Appointments   Date Time Provider Rosa Isela Mancilla   5/20/2021 11:30 AM [facility]STEN ECHO [facility]STEN   5/25/2021  9:30 AM REF SO PFLD REF [facility]17 Ref PFLD 17   5/27/2021  4:40 PM

## 2021-05-25 ENCOUNTER — LAB ENCOUNTER (OUTPATIENT)
Dept: LAB | Age: 86
End: 2021-05-25
Attending: FAMILY MEDICINE
Payer: MEDICARE

## 2021-05-25 ENCOUNTER — TELEPHONE (OUTPATIENT)
Dept: FAMILY MEDICINE CLINIC | Facility: CLINIC | Age: 86
End: 2021-05-25

## 2021-05-25 DIAGNOSIS — E55.9 VITAMIN D DEFICIENCY: ICD-10-CM

## 2021-05-25 DIAGNOSIS — E03.9 ACQUIRED HYPOTHYROIDISM: ICD-10-CM

## 2021-05-25 DIAGNOSIS — E78.00 PURE HYPERCHOLESTEROLEMIA: ICD-10-CM

## 2021-05-25 PROCEDURE — 36415 COLL VENOUS BLD VENIPUNCTURE: CPT

## 2021-05-25 PROCEDURE — 84443 ASSAY THYROID STIM HORMONE: CPT

## 2021-05-25 PROCEDURE — 80053 COMPREHEN METABOLIC PANEL: CPT

## 2021-05-25 PROCEDURE — 82306 VITAMIN D 25 HYDROXY: CPT

## 2021-05-25 PROCEDURE — 80061 LIPID PANEL: CPT

## 2021-05-25 NOTE — TELEPHONE ENCOUNTER
Pt' daughter, Brittney Birmingham, called asking for one more month FMLA extension, same form and also fax to [address] to [phone] (please see TE 4/15/21).  Thank you

## 2021-05-25 NOTE — TELEPHONE ENCOUNTER
Pt's dtr is requesting her Int FMLA to care for pt be extended 1 more month, pt is still having medica issues & has many upcoming appt: OK to extend FMLA 1 more month?   Date Time Provider Rosa Isela Mancilla   5/20/2021 11:30 AM KRISTEN ECHO MAD C[facility] MAD

## 2021-05-26 ENCOUNTER — TELEPHONE (OUTPATIENT)
Dept: FAMILY MEDICINE CLINIC | Facility: CLINIC | Age: 86
End: 2021-05-26

## 2021-05-26 DIAGNOSIS — R79.89 ELEVATED SERUM CREATININE: Primary | ICD-10-CM

## 2021-05-26 DIAGNOSIS — Z78.0 ASYMPTOMATIC MENOPAUSAL STATE: ICD-10-CM

## 2021-05-26 NOTE — TELEPHONE ENCOUNTER
----- Message from Trell Conley MD sent at 5/26/2021  9:31 AM CDT -----  Keep good hydration and recheck BMP in two weeks.

## 2021-05-26 NOTE — TELEPHONE ENCOUNTER
Called pt and informed her of lab results and below orders. Lab ordered, all questions answered and pt verbalized understanding. Pt then asking if there is a medication that she can take to help make her \"bones stronger\".  Pt states she was informed by

## 2021-05-27 NOTE — TELEPHONE ENCOUNTER
Pt eligible to received FMLA through work until 7/26/21. FMLA form updated & faxed back to [facility]. Confirmation received. Tonny Dunbar notified of this. All questions answered, she expresses understanding.

## 2021-06-01 NOTE — TELEPHONE ENCOUNTER
Called pt but no answer. VM left to call office when available. This is the third attempt. Pt not active on [employer] and VIKKI is not updated. Will close encounter.

## 2021-06-02 NOTE — TELEPHONE ENCOUNTER
Returned pt's phone call and informed her of dexa bone density order. Central scheduling phone number provided. Pt verbalized understanding.

## 2021-06-04 ENCOUNTER — MED REC SCAN ONLY (OUTPATIENT)
Dept: FAMILY MEDICINE CLINIC | Facility: CLINIC | Age: 86
End: 2021-06-04

## 2021-06-08 ENCOUNTER — HOSPITAL ENCOUNTER (OUTPATIENT)
Dept: BONE DENSITY | Age: 86
Discharge: HOME | End: 2021-06-08
Attending: FAMILY MEDICINE
Payer: MEDICARE

## 2021-06-08 DIAGNOSIS — Z78.0 ASYMPTOMATIC MENOPAUSAL STATE: ICD-10-CM

## 2021-06-08 PROCEDURE — 77080 DXA BONE DENSITY AXIAL: CPT | Performed by: FAMILY MEDICINE

## 2021-06-09 ENCOUNTER — TELEPHONE (OUTPATIENT)
Dept: FAMILY MEDICINE CLINIC | Facility: CLINIC | Age: 86
End: 2021-06-09

## 2021-06-09 NOTE — TELEPHONE ENCOUNTER
----- Message from Milagros Lawler MD sent at 6/8/2021  4:21 PM CDT -----  Cont. Vit. D, staying active, high yogurt, milk diet. Overall food for pt's age.

## 2021-06-09 NOTE — TELEPHONE ENCOUNTER
Notified the patient of the below results regarding the dexa bone scan. Patient verbalized understanding. Answered all questions at this time.

## 2021-06-10 ENCOUNTER — OFFICE VISIT (OUTPATIENT)
Dept: ORTHOPEDICS CLINIC | Facility: CLINIC | Age: 86
End: 2021-06-10
Payer: COMMERCIAL

## 2021-06-10 ENCOUNTER — HOSPITAL ENCOUNTER (OUTPATIENT)
Dept: GENERAL RADIOLOGY | Age: 86
Discharge: HOME | End: 2021-06-10
Attending: ORTHOPAEDIC SURGERY
Payer: MEDICARE

## 2021-06-10 VITALS — HEART RATE: 66 BPM | HEIGHT: 64.75 IN | OXYGEN SATURATION: 98 % | WEIGHT: 189.19 LBS | BODY MASS INDEX: 31.91 KG/M2

## 2021-06-10 DIAGNOSIS — M17.0 PRIMARY OSTEOARTHRITIS OF BOTH KNEES: Primary | ICD-10-CM

## 2021-06-10 DIAGNOSIS — M25.561 RIGHT KNEE PAIN, UNSPECIFIED CHRONICITY: ICD-10-CM

## 2021-06-10 DIAGNOSIS — M25.562 LEFT KNEE PAIN, UNSPECIFIED CHRONICITY: ICD-10-CM

## 2021-06-10 PROCEDURE — 20610 DRAIN/INJ JOINT/BURSA W/O US: CPT | Performed by: ORTHOPAEDIC SURGERY

## 2021-06-10 PROCEDURE — 73564 X-RAY EXAM KNEE 4 OR MORE: CPT | Performed by: ORTHOPAEDIC SURGERY

## 2021-06-10 PROCEDURE — 3008F BODY MASS INDEX DOCD: CPT | Performed by: ORTHOPAEDIC SURGERY

## 2021-06-10 PROCEDURE — 99203 OFFICE O/P NEW LOW 30 MIN: CPT | Performed by: ORTHOPAEDIC SURGERY

## 2021-06-10 RX ORDER — TRIAMCINOLONE ACETONIDE 40 MG/ML
80 INJECTION, SUSPENSION INTRA-ARTICULAR; INTRAMUSCULAR ONCE
Status: COMPLETED | OUTPATIENT
Start: 2021-06-10 | End: 2021-06-10

## 2021-06-10 RX ADMIN — TRIAMCINOLONE ACETONIDE 80 MG: 40 INJECTION, SUSPENSION INTRA-ARTICULAR; INTRAMUSCULAR at 10:38:00

## 2021-06-10 NOTE — H&P
[facility] New Patient Note    CC: Patient presents with:  Knee Pain: bilateral knee pain       HPI: This 82year old female presents today with complaints of bilateral knee pain. Symptoms have been ongoing for a long time, years.   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 Nathalie Mosher   • [address] [place] [address] colchicine 0.6 MG Oral Tab Take 1.2 mg (2 tablets) by mouth at first sign of gout flare then take 0.6 mg (1 tablet) 1 hour later      • methotrexate 2.5 MG Oral Tab Take 15 mg by mouth once a week.        • TraMADol HCl 50 MG Oral Tab Take 50 mg by mouth ev full  • Knee stable to varus and valgus stress  • Neuromuscular: 5 out of 5 quad strength, sensation intact light touch  • Vascular: Warm well perfused, chronic vascular changes to lower extremities      Imaging: X-rays of both knees personally viewed, ind [phone]

## 2021-06-10 NOTE — PATIENT INSTRUCTIONS
What Is Arthritis? Arthritis is a disease that affects the joints. Joints are the parts where bones meet and move. It can affect any joint in your body. There are many types of arthritis.  They include:   · Osteoarthritis  · Rheumatoid arthritis  · Gout  · The joint's range of motion can become limited. Symptoms  Arthritis can affect any joint. Weight-bearing joints, such as the hips and knees, are often affected. Common symptoms are joint pain and stiffness.  Pain and stiffness may get worse with inactivi pounds. Losing one single pound takes multiple pounds of pressure off the joints. Losing 10 pounds can take 50 pounds of pressure off the joints. The tips below may help:  · Start a weight-loss program with the help of your healthcare provider.   · Ask your exercise part of your life. Talk with your healthcare provider about what is safe for you. Make sure you:  · Choose exercises that improve joint motion and make your muscles stronger. Learn how to do exercises correctly and safely.  Consider talking with a Isometric exercises are done by tightening the muscles without moving the joint. This may be a good way to strengthen the muscles around a stiff joint. · Avoid deep flexion or deep squatting (do not bend the knee more than 90 degrees).   · Focus on closed- flexibility activities such as yoga and mushtaq chi may improve pain and joint motion.  You might try:  · Yoga, including chair yoga, helps to keep your joints strong and flexible  · Mushtaq Chi, an ancient type of exercise with slow, gentle movements  · Water exer your wrists or other joints  · A brace to support a weak knee joint  · Orthotics for toe and foot involvement    Medical and surgical treatments  Discuss medical treatments with your healthcare provider to help reduce your pain and improve joint mobility. The cartilage is smoothed. Any pieces of cartilage that have broken off are removed. · Total joint replacement. The entire joint is taken out and replaced with a manmade joint using metal, ceramic, or plastic.  This is most often done with the knee or hip

## 2021-06-14 DIAGNOSIS — I10 ESSENTIAL HYPERTENSION, BENIGN: ICD-10-CM

## 2021-06-14 RX ORDER — VALSARTAN AND HYDROCHLOROTHIAZIDE 160; 25 MG/1; MG/1
1 TABLET ORAL DAILY
Qty: 90 TABLET | Refills: 1 | Status: SHIPPED | OUTPATIENT
Start: 2021-06-14 | End: 2022-01-28

## 2021-06-22 DIAGNOSIS — E03.9 ACQUIRED HYPOTHYROIDISM: ICD-10-CM

## 2021-06-22 RX ORDER — LEVOTHYROXINE SODIUM 50 MCG
TABLET ORAL
Qty: 90 TABLET | Refills: 0 | Status: SHIPPED | OUTPATIENT
Start: 2021-06-22 | End: 2021-09-27

## 2021-07-01 ENCOUNTER — OFFICE VISIT (OUTPATIENT)
Dept: PHYSICAL THERAPY | Age: 86
End: 2021-07-01
Attending: ORTHOPAEDIC SURGERY
Payer: MEDICARE

## 2021-07-01 ENCOUNTER — TELEPHONE (OUTPATIENT)
Dept: PHYSICAL THERAPY | Facility: HOSPITAL | Age: 86
End: 2021-07-01

## 2021-07-01 ENCOUNTER — ORDER TRANSCRIPTION (OUTPATIENT)
Dept: PHYSICAL THERAPY | Facility: HOSPITAL | Age: 86
End: 2021-07-01

## 2021-07-01 DIAGNOSIS — M41.9 SCOLIOSIS OF LUMBAR SPINE: Primary | ICD-10-CM

## 2021-07-01 DIAGNOSIS — M51.37 DDD (DEGENERATIVE DISC DISEASE), LUMBOSACRAL: ICD-10-CM

## 2021-07-01 DIAGNOSIS — M43.06 ACQUIRED SPONDYLOLYSIS OF LUMBAR SPINE: ICD-10-CM

## 2021-07-01 DIAGNOSIS — M41.9 SCOLIOSIS OF LUMBAR SPINE: ICD-10-CM

## 2021-07-01 PROCEDURE — 97161 PT EVAL LOW COMPLEX 20 MIN: CPT

## 2021-07-01 PROCEDURE — 97110 THERAPEUTIC EXERCISES: CPT

## 2021-07-01 NOTE — PROGRESS NOTES
SPINE EVALUATION:   Referring Physician: Dr. Ang Baker  Diagnosis:  s/p L4-S1 ALIF on 1/27/21     Date of Service: 7/1/2021     PATIENT SUMMARY   Fatemeh Flores is a 82year old female who was seen in Jan 2021 for neck pain, which did improve with therap as limited with walking and standing tolerance pre-surgery because of the pain in her back (used a walker and needed frequent seated rests).  Pt goals include to improve her standing tolerance and be able to bend to water the flowers  Past medical history w paraspinals.  MIN tension ADDI sacral fascia; nontender piriformis    Strength: (* denotes performed with pain)  LE   Hip flexion (L2): R 4+/5; L 4/5  Hip abduction: R 4+/5; L 4+/5  Hip ER: R 4+/5; L 4+/5  Knee Flexion: R 5/5; L 5/5   Knee extension (L3): R total of 16 visits over a 90 day period.  Treatment will include: Gait training, Manual Therapy, Neuromuscular Re-education, Therapeutic Exercise and Home Exercise Program instruction    Education or treatment limitation: None  Rehab Potential:good due to g

## 2021-07-07 ENCOUNTER — OFFICE VISIT (OUTPATIENT)
Dept: PHYSICAL THERAPY | Age: 86
End: 2021-07-07
Attending: ORTHOPAEDIC SURGERY
Payer: MEDICARE

## 2021-07-07 PROCEDURE — 97140 MANUAL THERAPY 1/> REGIONS: CPT

## 2021-07-07 PROCEDURE — 97110 THERAPEUTIC EXERCISES: CPT

## 2021-07-07 PROCEDURE — 97112 NEUROMUSCULAR REEDUCATION: CPT

## 2021-07-07 NOTE — PROGRESS NOTES
Dx:  s/p L4-S1 ALIF on 1/27/21           Insurance (Authorized # of Visits):  [facility] Medicare           Authorizing Physician: Dr. Teo Lemus  Next MD visit: End of July  Fall Risk: High         Precautions: avoid excessive flexion/extension/isometrics maintain progress achieved in PT -progress    Plan: add gait training if knees are not too sore  Date: 7/7/2021  TX#: 2/14 Date:                 TX#: 3/ Date:                 TX#: 4/ Date:                 TX#: 5/ Date:    Tx#: 6/   Therex  Nustep L3 x5 min

## 2021-07-13 ENCOUNTER — OFFICE VISIT (OUTPATIENT)
Dept: PHYSICAL THERAPY | Age: 86
End: 2021-07-13
Attending: ORTHOPAEDIC SURGERY
Payer: MEDICARE

## 2021-07-13 PROCEDURE — 97140 MANUAL THERAPY 1/> REGIONS: CPT

## 2021-07-13 PROCEDURE — 97112 NEUROMUSCULAR REEDUCATION: CPT

## 2021-07-13 NOTE — PROGRESS NOTES
Dx:  s/p L4-S1 ALIF on 1/27/21           Insurance (Authorized # of Visits):  [facility] Medicare           Authorizing Physician: Dr. Aide Gunter  Next MD visit: End of July  Fall Risk: High         Precautions: avoid excessive flexion/extension/isometrics progress standing stability training   Date: 7/7/2021  TX#: 2/14 Date: 7/13/2021                 TX#: 3/14 Date:                 TX#: 4/ Date:                 TX#: 5/ Date:    Tx#: 6/   Therex  Nustep L3 x5 min    floor to ceiling stretch x5  LTR x10  SKTC

## 2021-07-15 ENCOUNTER — OFFICE VISIT (OUTPATIENT)
Dept: PHYSICAL THERAPY | Age: 86
End: 2021-07-15
Attending: FAMILY MEDICINE
Payer: MEDICARE

## 2021-07-15 PROCEDURE — 97140 MANUAL THERAPY 1/> REGIONS: CPT

## 2021-07-15 PROCEDURE — 97112 NEUROMUSCULAR REEDUCATION: CPT

## 2021-07-15 NOTE — PROGRESS NOTES
Dx:  s/p L4-S1 ALIF on 1/27/21           Insurance (Authorized # of Visits):  [facility] Medicare           Next MD visit: End of July  Fall Risk: High         Precautions: avoid excessive flexion/extension/isometrics           Subjective: Her back felt a little -progress    Plan: reassess lumbar AROM flex; attempt squats to  cones from step to simulate watering the garden  Date: 7/7/2021  TX#: 2/14 Date: 7/13/2021                 TX#: 3/14 Date: 7/15/2021                TX#: 4/14 Date:                 TX#: ea, hooklying TA 5s x5, TA with march x15, TA with BKFO x15, floor to ceiling stretch x5    Charges: neuro re-ed x2, manual x1       Total Timed Treatment: 45 min  Total Treatment Time: 55 min

## 2021-07-20 ENCOUNTER — OFFICE VISIT (OUTPATIENT)
Dept: PHYSICAL THERAPY | Age: 86
End: 2021-07-20
Attending: ORTHOPAEDIC SURGERY
Payer: MEDICARE

## 2021-07-20 PROCEDURE — 97110 THERAPEUTIC EXERCISES: CPT

## 2021-07-20 PROCEDURE — 97140 MANUAL THERAPY 1/> REGIONS: CPT

## 2021-07-20 NOTE — PROGRESS NOTES
Dx:  s/p L4-S1 ALIF on 1/27/21           Insurance (Authorized # of Visits):  [facility] Medicare           Next MD visit: 7/27/21  Fall Risk: High         Precautions: avoid excessive flexion/extension/isometrics           Subjective: She has been feeling sore l demonstrate improved functional core strength to be able to water her garden with <4/10 pain   · Pt will ambulate with erect posture and appropriate step length up to 200 ft with SPC to ambulate in the community with improved ease and comfort  · Pt will be Therapy  STM seated cervical paraspinals and UT x5 min  C2-7 Gr II rotation glides x20s ea  Supported sitting: STM ADDI thoracolumbar paraspinals and scar x10 min  Manual Therapy  STM seated cervical paraspinals and UT x5 min  C2-7 Gr II rotation glides x20

## 2021-07-22 ENCOUNTER — OFFICE VISIT (OUTPATIENT)
Dept: PHYSICAL THERAPY | Age: 86
End: 2021-07-22
Attending: ORTHOPAEDIC SURGERY
Payer: MEDICARE

## 2021-07-22 PROCEDURE — 97140 MANUAL THERAPY 1/> REGIONS: CPT

## 2021-07-22 PROCEDURE — 97110 THERAPEUTIC EXERCISES: CPT

## 2021-07-22 NOTE — PROGRESS NOTES
ProgressSummary  Pt has attended 6 visits in Physical Therapy.   Dx:  s/p L4-S1 ALIF on 1/27/21           Insurance (Authorized # of Visits):  [facility] Medicare           Next MD visit: 7/27/21  Fall Risk: High         Precautions: avoid excessive flexion/exte step length.   Flexibility:   LE   Hip Flexor: R MOD, L MOD  Hamstrings: R WNL; L WNL  Piriformis: R WNL; L WNL      Goals: (to be met in 14 visits)   · Pt will improve transversus abdominis recruitment to perform proper isometric contraction without requir 7/15/2021                TX#: 4/14 Date: 7/20/2021               TX#: 5/14 Date: 7/22/2021  Tx#: 6/14   Therex  Nustep L3 x5 min    floor to ceiling stretch x5  LTR x10  SKTC with strap 2x20s ea    Therex  Nustep L3 x5 min    LTR x10 Therex  Nustep L3 x5 m and scar x8 min  Manual Therapy  Supine L cervical paraspinal, UT, scalene STM x12 min  UT stretch x2 L  Cervical distraction with ROT x5 ea  C2-4 Gr III L rotation glides 4x20s  -translation glides R 2x20s ea  -upslope x3 ea  sidelying LTR Gr II x2 min ea

## 2021-07-26 ENCOUNTER — OFFICE VISIT (OUTPATIENT)
Dept: PHYSICAL THERAPY | Age: 86
End: 2021-07-26
Attending: FAMILY MEDICINE
Payer: MEDICARE

## 2021-07-26 PROCEDURE — 97140 MANUAL THERAPY 1/> REGIONS: CPT

## 2021-07-26 PROCEDURE — 97110 THERAPEUTIC EXERCISES: CPT

## 2021-07-26 NOTE — PROGRESS NOTES
Dx:  s/p L4-S1 ALIF on 1/27/21           Insurance (Authorized # of Visits):  [facility] Medicare           Next MD visit: 7/27/21  Fall Risk: High         Precautions: avoid excessive flexion/extension/isometrics           Subjective Her back and neck have been contraction without requiring verbal or tactile cuing to promote advancement of therex -MET  · Pt will improve lumbar spine AROM flexion to >85 deg to allow increase ease with bending forward to don shoes -MET  · Pt will have decreased paraspinal mm tensio SLR 1x10 ea    Seated on SB   -gentle bounce x20  -pelvic tilts x10 A/P, M/L  --scap retraction x15  -alt UE raise x12  -march x15 (CGA)    Modified tandem 2x20s ea (fair>good)  Rock and reach [employer] ea Neuro Re-ed  hooklying TA   TA with BKFO x20  TA with SL Modalities  MHP cervical and lumbar spine x10 min Modalities  MHP cervical and lumbar spine x10 min Modalities  MHP cervical and lumbar spine x10 min   HEP: LTR x10, SKTC with strap 2x20s ea, hooklying TA 5s x5, TA with march x15, TA with BKFO x15, floor t

## 2021-07-28 ENCOUNTER — OFFICE VISIT (OUTPATIENT)
Dept: FAMILY MEDICINE CLINIC | Facility: CLINIC | Age: 86
End: 2021-07-28
Payer: COMMERCIAL

## 2021-07-28 VITALS
SYSTOLIC BLOOD PRESSURE: 112 MMHG | HEIGHT: 65 IN | DIASTOLIC BLOOD PRESSURE: 62 MMHG | WEIGHT: 173 LBS | RESPIRATION RATE: 20 BRPM | BODY MASS INDEX: 28.82 KG/M2 | OXYGEN SATURATION: 98 % | HEART RATE: 82 BPM

## 2021-07-28 DIAGNOSIS — M47.812 CERVICAL ARTHRITIS: Primary | ICD-10-CM

## 2021-07-28 DIAGNOSIS — G44.209 TENSION HEADACHE: ICD-10-CM

## 2021-07-28 PROCEDURE — 3008F BODY MASS INDEX DOCD: CPT | Performed by: FAMILY MEDICINE

## 2021-07-28 PROCEDURE — 3078F DIAST BP <80 MM HG: CPT | Performed by: FAMILY MEDICINE

## 2021-07-28 PROCEDURE — 99214 OFFICE O/P EST MOD 30 MIN: CPT | Performed by: FAMILY MEDICINE

## 2021-07-28 PROCEDURE — 3074F SYST BP LT 130 MM HG: CPT | Performed by: FAMILY MEDICINE

## 2021-07-28 RX ORDER — CYCLOBENZAPRINE HCL 5 MG
5 TABLET ORAL 3 TIMES DAILY PRN
Qty: 30 TABLET | Refills: 0 | Status: SHIPPED | OUTPATIENT
Start: 2021-07-28

## 2021-07-28 RX ORDER — METHYLPREDNISOLONE 4 MG/1
TABLET ORAL
Qty: 1 EACH | Refills: 0 | Status: SHIPPED | OUTPATIENT
Start: 2021-07-28 | End: 2021-10-07 | Stop reason: ALTCHOICE

## 2021-07-28 RX ORDER — FLUCONAZOLE 150 MG/1
150 TABLET ORAL DAILY
Qty: 5 TABLET | Refills: 5 | Status: SHIPPED | OUTPATIENT
Start: 2021-07-28 | End: 2021-10-07 | Stop reason: ALTCHOICE

## 2021-07-28 NOTE — PROGRESS NOTES
Patient presents with:  Neck Pain: pt reports right side pain from head through her neck and down to her arm, getting worse over the last week     HPI:  Patient complains of headaches. Has had for one week.   Description of pain: throbbing, dull, one si 1 tablet by mouth daily. • colchicine 0.6 MG Oral Tab Take 1.2 mg (2 tablets) by mouth at first sign of gout flare then take 0.6 mg (1 tablet) 1 hour later      • methotrexate 2.5 MG Oral Tab Take 15 mg by mouth once a week.        • TraMADol HCl 50 MG 01/08/2019    BTS Cysto- Dr. Jeferson Lagos   • OTHER SURGICAL HISTORY      neck surgery   • OTHER SURGICAL HISTORY  01/21/2020    Cysto Dr. Jeferson Lagos       Family History   Problem Relation Age of Onset   • Cancer Father         liver   • Diabetes Father    • Heart [employer] negative bilaterally. Romberg negative. Neck: supple, limited ROM to 10 degrees. ASSESSMENT / PLAN:  .Dwight White was seen today for neck pain.     Diagnoses and all orders for this visit:    Tension headache    Other orders  -     methylPREDNISolone (MEDROL

## 2021-07-29 ENCOUNTER — OFFICE VISIT (OUTPATIENT)
Dept: PHYSICAL THERAPY | Age: 86
End: 2021-07-29
Attending: FAMILY MEDICINE
Payer: MEDICARE

## 2021-07-29 PROCEDURE — 97110 THERAPEUTIC EXERCISES: CPT

## 2021-07-29 PROCEDURE — 97140 MANUAL THERAPY 1/> REGIONS: CPT

## 2021-07-29 PROCEDURE — 97112 NEUROMUSCULAR REEDUCATION: CPT

## 2021-07-29 NOTE — PROGRESS NOTES
Dx:  s/p L4-S1 ALIF on 1/27/21           Insurance (Authorized # of Visits):  [facility] Medicare           Next MD visit: Jan 2021  Fall Risk: High         Precautions: avoid excessive flexion/extension/isometrics           Subjective Saw Dr. Isaac Briggs yesterday a met in 14 visits)   · Pt will improve transversus abdominis recruitment to perform proper isometric contraction without requiring verbal or tactile cuing to promote advancement of therex -MET  · Pt will improve lumbar spine AROM flexion to >85 deg to allow 1x10 ea  Dead bug 1x25     Seated on SB   -gentle bounce x20  -pelvic tilts x10 A/P, M/L  --scap retraction x15  -alt UE raise x16  -march 2x15 (CGA)    Modified tandem 2x30s ea (good)  Rock and reach [employer] march x15  Side step //bars x2 laps - - - x15, TA with BKFO x15, floor to ceiling stretch x5    Charges: therex x2, neuro re-ed x1, manual x1      Total Timed Treatment: 60 min  Total Treatment Time: 70 min

## 2021-08-02 ENCOUNTER — ORDER TRANSCRIPTION (OUTPATIENT)
Dept: PHYSICAL THERAPY | Facility: HOSPITAL | Age: 86
End: 2021-08-02

## 2021-08-02 DIAGNOSIS — M47.22 CERVICAL SPONDYLOSIS WITH RADICULOPATHY: Primary | ICD-10-CM

## 2021-08-03 ENCOUNTER — OFFICE VISIT (OUTPATIENT)
Dept: PHYSICAL THERAPY | Age: 86
End: 2021-08-03
Attending: FAMILY MEDICINE
Payer: MEDICARE

## 2021-08-03 DIAGNOSIS — M47.22 CERVICAL SPONDYLOSIS WITH RADICULOPATHY: ICD-10-CM

## 2021-08-03 PROCEDURE — 97161 PT EVAL LOW COMPLEX 20 MIN: CPT

## 2021-08-03 PROCEDURE — 97110 THERAPEUTIC EXERCISES: CPT

## 2021-08-03 PROCEDURE — 97140 MANUAL THERAPY 1/> REGIONS: CPT

## 2021-08-05 ENCOUNTER — OFFICE VISIT (OUTPATIENT)
Dept: PHYSICAL THERAPY | Age: 86
End: 2021-08-05
Attending: ORTHOPAEDIC SURGERY
Payer: MEDICARE

## 2021-08-05 PROCEDURE — 97112 NEUROMUSCULAR REEDUCATION: CPT

## 2021-08-05 PROCEDURE — 97140 MANUAL THERAPY 1/> REGIONS: CPT

## 2021-08-05 PROCEDURE — 97110 THERAPEUTIC EXERCISES: CPT

## 2021-08-05 NOTE — PROGRESS NOTES
Dx:  s/p L4-S1 ALIF on 1/27/21; cervical spondylosis          Insurance (Authorized # of Visits):  [facility] Medicare           Next MD visit: Jan 2021  Fall Risk: High         Precautions: none          Subjective Still taking the mm relaxor, but does not think and step length with gait training in //bars; still requires occasional cuing, but able to maintain good form for short distance. Good stability and posture with YTB rows.  Good tolerance for thoracic roll outs and mobilizations with foam roll; resumed lumb cervicothoracic ROT x5 ea  scap retract 2x10  -cervical retract 2x8  UT and LS stretch 2x10s ea R/L  Hip flexor stretch -3x10s ea  Sit<>stands from elevated plinth with red med ball 2x5 (fatigue>pain) Therex  floor to ceiling stretch x3  Seated cervicothor cervical and lumbar spine x10 min declined   Lumbar HEP: LTR x10, SKTC with strap 2x20s ea, hooklying TA 5s x5, TA with march x15, TA with BKFO x15, floor to ceiling stretch x5  New Cervical HEP: cervical retraction, scapular retraction, scapular elevation

## 2021-08-10 ENCOUNTER — OFFICE VISIT (OUTPATIENT)
Dept: PHYSICAL THERAPY | Age: 86
End: 2021-08-10
Attending: ORTHOPAEDIC SURGERY
Payer: MEDICARE

## 2021-08-10 PROCEDURE — 97112 NEUROMUSCULAR REEDUCATION: CPT

## 2021-08-10 PROCEDURE — 97140 MANUAL THERAPY 1/> REGIONS: CPT

## 2021-08-10 PROCEDURE — 97110 THERAPEUTIC EXERCISES: CPT

## 2021-08-10 NOTE — PROGRESS NOTES
Dx:  s/p L4-S1 ALIF on 1/27/21; cervical spondylosis          Insurance (Authorized # of Visits):  [facility] Medicare           Next MD visit: Jan 2021  Fall Risk: High         Precautions: none          Subjective Her legs are feeling a little better and more s improvement in stability with tandem gait and gait training with cuing for TA engagement/postural correction. Improving scar mobility in lumbar spine. Still very hypomobile in cervical spine and swollen facets R>L.       Goals: (to be met in 14 visits)   · 2x10  -cervical retract 2x8  UT and LS stretch 2x10s ea R/L  Hip flexor stretch -3x10s ea  Sit<>stands from elevated plinth with red med ball 2x5 (fatigue>pain) Therex  floor to ceiling stretch x3  Seated cervicothoracic ROT x5 ea  scap retract 2x10  -cerv glides Gr III 2x15s ea  -ROT glides Gr II 2x10s ea  STM ADDI cervical paraspinals, UT, scalenes, SCM L>R x6 min  Manual stretch L scalenes 2x15s; ADDI UT and LS x10s ea Manual Therapy  C2-7 L PA glides in sidelying Gr III x10s ea  STM L cervical paraspinals,

## 2021-08-12 ENCOUNTER — OFFICE VISIT (OUTPATIENT)
Dept: PHYSICAL THERAPY | Age: 86
End: 2021-08-12
Attending: ORTHOPAEDIC SURGERY
Payer: MEDICARE

## 2021-08-12 PROCEDURE — 97140 MANUAL THERAPY 1/> REGIONS: CPT

## 2021-08-12 PROCEDURE — 97112 NEUROMUSCULAR REEDUCATION: CPT

## 2021-08-12 PROCEDURE — 97110 THERAPEUTIC EXERCISES: CPT

## 2021-08-12 NOTE — PROGRESS NOTES
Dx:  s/p L4-S1 ALIF on 1/27/21; cervical spondylosis          Insurance (Authorized # of Visits):  [facility] Medicare           Next MD visit: Jan 2021  Fall Risk: High         Precautions: none          Subjective Back was really feeling better until last night STM and heat at end of session.      Goals: (to be met in 14 visits)   · Pt will improve transversus abdominis recruitment to perform proper isometric contraction without requiring verbal or tactile cuing to promote advancement of therex -MET  · Pt will imp (fatigue>pain) Therex  floor to ceiling stretch x3  Seated cervicothoracic ROT x5 ea  scap retract 2x10  -cervical retract 2x10  Scapular elevation/depression 2x10   UT and LS stretch 3x10s ea R/L Therex  NuStep L1 x5 min   Floor to ceiling (C/L flex and e Therapy  Supine L cervical paraspinal, UT, scalene STM x8 min  L C3-6 sidelying unilateral PA Gr II-III 2x20s ea  sidelying LTR Gr III x3 min ea  Manual Therapy  Manual cervical distraction 3x30s  C2-7 R/L translation glides Gr III 2x15s ea  -ROT glides Gr

## 2021-08-16 ENCOUNTER — OFFICE VISIT (OUTPATIENT)
Dept: PHYSICAL THERAPY | Age: 86
End: 2021-08-16
Attending: ORTHOPAEDIC SURGERY
Payer: MEDICARE

## 2021-08-16 PROCEDURE — 97116 GAIT TRAINING THERAPY: CPT

## 2021-08-16 PROCEDURE — 97110 THERAPEUTIC EXERCISES: CPT

## 2021-08-16 PROCEDURE — 97140 MANUAL THERAPY 1/> REGIONS: CPT

## 2021-08-16 RX ORDER — FUROSEMIDE 20 MG/1
TABLET ORAL
Qty: 30 TABLET | Refills: 0 | OUTPATIENT
Start: 2021-08-16

## 2021-08-16 NOTE — TELEPHONE ENCOUNTER
Patient states she only takes this medications as needed. This medication is managed by Dr Celestina Diggs, Pulmonologist. Patient has an appt next and will discuss medication at that time.

## 2021-08-16 NOTE — PROGRESS NOTES
Dx:  s/p L4-S1 ALIF on 1/27/21; cervical spondylosis          Insurance (Authorized # of Visits):  [facility] Medicare           Next MD visit: Jan 2021  Fall Risk: High         Precautions: none          Subjective Neck has felt noticabely better lately.  She sti neural tension with position watering plants.      Goals: (to be met in 14 visits)   · Pt will improve transversus abdominis recruitment to perform proper isometric contraction without requiring verbal or tactile cuing to promote advancement of therex -MET med ball 2x5 (fatigue>pain) Therex  floor to ceiling stretch x3  Seated cervicothoracic ROT x5 ea  scap retract 2x10  -cervical retract 2x10  Scapular elevation/depression 2x10   UT and LS stretch 3x10s ea R/L Therex  NuStep L1 x5 min   Floor to ceiling (C ROT x10 (good) Neuro Re-ed  Standing slump flossing on step x8 ea         - - Gait Training  Heel>toe BIG step length in //bars x2 laps  Gait Training  Heel>toe BIG step length in //bars x2 laps - Gait Training  Hurdles in //bars 2 UE> PRN (70%) x3 Laps  T elevation/depression, UT and LS stretch    Charges: therex x1, gait x1, manual x1      Total Timed Treatment: 45 min  Total Treatment Time: 55 min

## 2021-08-18 ENCOUNTER — OFFICE VISIT (OUTPATIENT)
Dept: PHYSICAL THERAPY | Age: 86
End: 2021-08-18
Attending: ORTHOPAEDIC SURGERY
Payer: MEDICARE

## 2021-08-18 PROCEDURE — 97140 MANUAL THERAPY 1/> REGIONS: CPT

## 2021-08-18 NOTE — PROGRESS NOTES
Dx:  s/p L4-S1 ALIF on 1/27/21; cervical spondylosis          Insurance (Authorized # of Visits):  [facility] Medicare           Next MD visit: Jan 2021  Fall Risk: High         Precautions: none          Subjective Brinda Guerra reports that she is definitely feeling be Goals: (to be met in 14 visits)   · Pt will improve transversus abdominis recruitment to perform proper isometric contraction without requiring verbal or tactile cuing to promote advancement of therex -MET  · Pt will improve lumbar spine AROM flexion t table with foam roll x5  -with OP T2-L3 x10s ea  Therex  Nustep L3 x5 min    Floor to ceiling (C/L flex and ext only x8)  Cervical AROM with laser feedback ROT 2x5 ea (poor L)  -flex/ext x5 ea    Calf stretch at wall 2x30s ea    Thoracic roll outs on table scalenes, SCM x5 min  STM lumbar paraspinals, QL, scar mobilizations x5 min ea Manual Therapy  C2-7 rotation and translation glides R/L Gr III 2x20s ea   Cervical distract with PROM ROT x5 ea  STM L cervical paraspinals, UT, scalenes, SCM x5 min  sidelying

## 2021-08-24 ENCOUNTER — OFFICE VISIT (OUTPATIENT)
Dept: PHYSICAL THERAPY | Age: 86
End: 2021-08-24
Attending: ORTHOPAEDIC SURGERY
Payer: MEDICARE

## 2021-08-24 PROCEDURE — 97110 THERAPEUTIC EXERCISES: CPT

## 2021-08-24 PROCEDURE — 97112 NEUROMUSCULAR REEDUCATION: CPT

## 2021-08-24 PROCEDURE — 97140 MANUAL THERAPY 1/> REGIONS: CPT

## 2021-08-24 RX ORDER — FOLIC ACID 1 MG/1
TABLET ORAL
Qty: 90 TABLET | Refills: 0 | Status: SHIPPED | OUTPATIENT
Start: 2021-08-24 | End: 2021-11-30

## 2021-08-24 RX ORDER — FOLIC ACID 1 MG/1
TABLET ORAL
Qty: 90 TABLET | Refills: 0 | OUTPATIENT
Start: 2021-08-24

## 2021-08-24 NOTE — PROGRESS NOTES
Dx:  s/p L4-S1 ALIF on 1/27/21; cervical spondylosis          Insurance (Authorized # of Visits):  [facility] Medicare           Next MD visit: Jan 2021  Fall Risk: High         Precautions: none          Subjective Hiraperi Edwardo notes that she had an injection in her lo improvement in standing core stability; able to complete tandem gait without UE assist and no LOB.  Progressed squat training with cuing for wide SHAHEED and lumbar posturing to help with watering flowers and reaching for things in low shelves and drawers; good in PT -progress    Plan: assess gait up to 200 ft; continue cervical rotation glides and distraction with PROM ROT   Date: 8/5/2021  Tx#: 9/19 Date: 8/10/2021  Tx#: 10/19 Date: 8/12/2021  Tx#: 11/19 Date: 8/16/2021  Tx#: 12/19 Date: 8/18/2021  Tx#: 13/19 D step x8 ea   Neuro Re-ed  Static slow march x10 (good)  Tandem gait in //bars x2 laps (good)  Slow march x1 lap (good)  Standing slump flossing on step x8 ea     Gait Training  Heel>toe BIG step length in //bars x2 laps  Gait Training  Heel>toe BIG step le ceiling stretch x5  New Cervical HEP: cervical retraction, scapular retraction, scapular elevation/depression, UT and LS stretch    Charges: therex x1, neuro re-ed x1, manual x1     Total Timed Treatment: 45 min  Total Treatment Time: 55 min

## 2021-08-26 ENCOUNTER — OFFICE VISIT (OUTPATIENT)
Dept: PHYSICAL THERAPY | Age: 86
End: 2021-08-26
Attending: FAMILY MEDICINE
Payer: MEDICARE

## 2021-08-26 PROCEDURE — 97110 THERAPEUTIC EXERCISES: CPT

## 2021-08-26 PROCEDURE — 97140 MANUAL THERAPY 1/> REGIONS: CPT

## 2021-08-26 NOTE — PROGRESS NOTES
Dx:  s/p L4-S1 ALIF on 1/27/21; cervical spondylosis          Insurance (Authorized # of Visits):  [facility] Medicare           Next MD visit: Jan 2021  Fall Risk: High         Precautions: none          Subjective Woke up sore this morning in her back; not sure deg to allow increase ease with bending forward to don shoes -MET  · Pt will have decreased paraspinal mm tension to tolerate standing >25 minutes for cooking and laundry tasks -MET  · Pt will demonstrate improved functional core strength to be able to leroy x10  Seated trunk and cervical ROT x8 ea    YTB rows seated 2x10  -with ROT unilateral x10 ea   Neuro Re-ed  airex stance with YTB rows x10 (SBA)  -ext x10   -with unilateral trunk ROT x8 ea    Airex Slow march x20 (good)    airex stance with red med ball lumbar spine x10 min   Lumbar HEP: LTR x10, SKTC with strap 2x20s ea, hooklying TA 5s x5, TA with march x15, TA with BKFO x15, floor to ceiling stretch x5  New Cervical HEP: cervical retraction, scapular retraction, scapular elevation/depression, UT and LS

## 2021-08-30 ENCOUNTER — OFFICE VISIT (OUTPATIENT)
Dept: PHYSICAL THERAPY | Age: 86
End: 2021-08-30
Attending: ORTHOPAEDIC SURGERY
Payer: MEDICARE

## 2021-08-30 PROCEDURE — 97110 THERAPEUTIC EXERCISES: CPT

## 2021-08-30 PROCEDURE — 97140 MANUAL THERAPY 1/> REGIONS: CPT

## 2021-08-30 NOTE — PROGRESS NOTES
Dx:  s/p L4-S1 ALIF on 1/27/21; cervical spondylosis          Insurance (Authorized # of Visits):  [facility] Medicare           Next MD visit: Jan 2021  Fall Risk: High         Precautions: none          Subjective Feeling ok today; started the day without takin flexion to >85 deg to allow increase ease with bending forward to don shoes -MET  · Pt will have decreased paraspinal mm tension to tolerate standing >25 minutes for cooking and laundry tasks -MET  · Pt will demonstrate improved functional core strength to min    Squats to  cones from 10 inch step 2x9   Seated cat/camel x10  Seated trunk and cervical ROT x8 ea    YTB rows seated 2x10  -with ROT unilateral x10 ea Therex  Nustep L4 x5 min  Cervical AROM ROT x5 ea after manual  stance with ball reach gorge gr II x2 min  -central PA Gr II C2-6 x2 ea Manual Therapy  Seated STM  L cervical paraspinals, UT, scalenes, SCM x5 min  Seated C2-7 unilateral PA with rotation MWM x20s ea R/L  -central PA Gr II C2-6 x2 ea Manual Therapy  Seated STM  L cervical paraspinal

## 2021-08-31 ENCOUNTER — APPOINTMENT (OUTPATIENT)
Dept: PHYSICAL THERAPY | Age: 86
End: 2021-08-31
Payer: MEDICARE

## 2021-08-31 RX ORDER — SIMVASTATIN 20 MG
TABLET ORAL
Qty: 90 TABLET | Refills: 0 | Status: SHIPPED | OUTPATIENT
Start: 2021-08-31 | End: 2021-11-30

## 2021-09-02 ENCOUNTER — TELEPHONE (OUTPATIENT)
Dept: PHYSICAL THERAPY | Facility: HOSPITAL | Age: 86
End: 2021-09-02

## 2021-09-02 ENCOUNTER — APPOINTMENT (OUTPATIENT)
Dept: PHYSICAL THERAPY | Age: 86
End: 2021-09-02
Payer: MEDICARE

## 2021-09-07 ENCOUNTER — OFFICE VISIT (OUTPATIENT)
Dept: PHYSICAL THERAPY | Age: 86
End: 2021-09-07
Attending: ORTHOPAEDIC SURGERY
Payer: MEDICARE

## 2021-09-07 PROCEDURE — 97112 NEUROMUSCULAR REEDUCATION: CPT

## 2021-09-07 PROCEDURE — 97110 THERAPEUTIC EXERCISES: CPT

## 2021-09-07 PROCEDURE — 97140 MANUAL THERAPY 1/> REGIONS: CPT

## 2021-09-07 NOTE — PROGRESS NOTES
Dx:  s/p L4-S1 ALIF on 1/27/21; cervical spondylosis          Insurance (Authorized # of Visits):  [facility] Medicare           Next MD visit: Jan 2022  Fall Risk: High         Precautions: none          Subjective Farhana Covington reports that her neck and back have been proper isometric contraction without requiring verbal or tactile cuing to promote advancement of therex -MET  · Pt will improve lumbar spine AROM flexion to >85 deg to allow increase ease with bending forward to don shoes -MET  · Pt will have decreased par ea after manual  stance with ball reach floor to return x10  -trunk ROT x10 (good)      YTB rows standing 2x10  -with ROT unilateral 1x10 ea  -multifidi punch 1x10 ea  Strap SKTC stretch 2x20s ea  Therex  Nustep L4 x5 min    YTB rows standing 2x10  -with R ea  -inferior glides 2x30s  -MWM ER x2 min  sidelying LTR lumbar gapping Gr III x3 min ea    - Modalities  MHP cervical and lumbar spine x10 min Modalities  MHP cervical and lumbar spine x10 min Modalities  MHP cervical and lumbar spine x10 min -   Lumbar

## 2021-09-08 ENCOUNTER — OFFICE VISIT (OUTPATIENT)
Dept: PHYSICAL THERAPY | Age: 86
End: 2021-09-08
Attending: ORTHOPAEDIC SURGERY
Payer: MEDICARE

## 2021-09-08 PROCEDURE — 97110 THERAPEUTIC EXERCISES: CPT

## 2021-09-08 PROCEDURE — 97140 MANUAL THERAPY 1/> REGIONS: CPT

## 2021-09-08 PROCEDURE — 97112 NEUROMUSCULAR REEDUCATION: CPT

## 2021-09-08 NOTE — PROGRESS NOTES
Dx:  s/p L4-S1 ALIF on 1/27/21; cervical spondylosis          Insurance (Authorized # of Visits):  [facility] Medicare           Next MD visit: Jan 2022  Fall Risk: High         Precautions: none          Subjective Fell last night getting up from her dinner tabl speed; also limited by knee pain.       Goals: (to be met in 14 visits)   · Pt will improve transversus abdominis recruitment to perform proper isometric contraction without requiring verbal or tactile cuing to promote advancement of therex -MET  · Pt will 10 inch step 2x9   Seated cat/camel x10  Seated trunk and cervical ROT x8 ea    YTB rows seated 2x10  -with ROT unilateral x10 ea Therex  Nustep L4 x5 min  Cervical AROM ROT x5 ea after manual  stance with ball reach floor to return x10  -trunk ROT x10 (go x20s ea R/L  -central PA Gr II C2-6 x2 ea Manual Therapy  Seated STM  L cervical paraspinals, UT, scalenes, SCM x5 min  Seated C2-7 unilateral PA with rotation MWM x20s ea R/L  -central PA Gr II C2-6 x2 ea  Hip lateral glides Gr II 3x30s ea  -inferior glid

## 2021-09-09 NOTE — TELEPHONE ENCOUNTER
Medication(s) to Refill:   Requested Prescriptions     Pending Prescriptions Disp Refills   • FUROSEMIDE 20 MG Oral Tab [Pharmacy Med Name: FUROSEMIDE 20MG TABLETS] 30 tablet 3     Sig: TAKE 1 TABLET(20 MG) BY MOUTH DAILY         Reason for Medication Refi

## 2021-09-10 RX ORDER — FUROSEMIDE 20 MG/1
TABLET ORAL
Qty: 30 TABLET | Refills: 3 | Status: SHIPPED | OUTPATIENT
Start: 2021-09-10 | End: 2022-01-31

## 2021-09-13 ENCOUNTER — OFFICE VISIT (OUTPATIENT)
Dept: PHYSICAL THERAPY | Age: 86
End: 2021-09-13
Attending: ORTHOPAEDIC SURGERY
Payer: MEDICARE

## 2021-09-13 PROCEDURE — 97140 MANUAL THERAPY 1/> REGIONS: CPT

## 2021-09-13 NOTE — PROGRESS NOTES
Ashly Perez has attended 19 visits in Physical Therapy.    Dx:  s/p L4-S1 ALIF on 1/27/21; cervical spondylosis          Insurance (Authorized # of Visits):  [facility] Medicare           Next MD visit: Jan 2022  Fall Risk: High         Precautions: non Scap: R MIN; L MIN  Pec Major: R MIN; L MIN  Scalenes: R MIN, L MIN     neck FOTO 40/100 on 9/1/21 (35 at IE)  Low back FOTO: 40/100 (36 at IE)    Goals: (to be met in 14 visits)   · Pt will improve transversus abdominis recruitment to perform proper isome Date: 8/18/2021  Tx#: 13/19 Date: 8/24/2021  Tx#: 14/19 Date: 8/26/2021  Tx#: 15/19 Date: 8/30/2021  Tx#: 16/19 Date: 9/7/2021  Tx#: 17/19 Date: 9/8/2021  Tx#: 18/19 Date: 9/13/2021  Tx#: 19/19   Therex  Nustep L4 x5 min Therex  Nustep L4 x5 min    Squat and translation glides R/L Gr III 2x20s ea   Cervical distract with PROM ROT x5 ea  STM L cervical paraspinals, UT, scalenes, SCM x3 min  sidelying LTR Gr III x3 min ea   STM lumbar paraspinals, QL, scar mobilizations x4 min ea Manual Therapy  Seated STM tandem stance x30s ea  Cervical HEP: YTB rows x10, YTB trunk ROT with head turn x10 ea, UT and LS stretch    Charges:  manual x3   Total Timed Treatment: 40 min  Total Treatment Time: 40 min

## 2021-09-16 ENCOUNTER — APPOINTMENT (OUTPATIENT)
Dept: PHYSICAL THERAPY | Age: 86
End: 2021-09-16
Payer: MEDICARE

## 2021-09-27 DIAGNOSIS — E03.9 ACQUIRED HYPOTHYROIDISM: ICD-10-CM

## 2021-09-27 RX ORDER — LEVOTHYROXINE SODIUM 50 MCG
TABLET ORAL
Qty: 90 TABLET | Refills: 1 | Status: SHIPPED | OUTPATIENT
Start: 2021-09-27 | End: 2022-01-10

## 2021-10-05 ENCOUNTER — TELEPHONE (OUTPATIENT)
Dept: FAMILY MEDICINE CLINIC | Facility: CLINIC | Age: 86
End: 2021-10-05

## 2021-10-05 NOTE — TELEPHONE ENCOUNTER
Pt is having surgery on 11/01 with Dr. Kely Arreaga at [facility]. PT'S pre op appt is 10/15.

## 2021-10-07 ENCOUNTER — OFFICE VISIT (OUTPATIENT)
Dept: FAMILY MEDICINE CLINIC | Facility: CLINIC | Age: 86
End: 2021-10-07
Payer: COMMERCIAL

## 2021-10-07 ENCOUNTER — LAB ENCOUNTER (OUTPATIENT)
Dept: LAB | Age: 86
End: 2021-10-07
Attending: NURSE PRACTITIONER
Payer: MEDICARE

## 2021-10-07 VITALS
HEART RATE: 84 BPM | RESPIRATION RATE: 16 BRPM | DIASTOLIC BLOOD PRESSURE: 54 MMHG | OXYGEN SATURATION: 98 % | SYSTOLIC BLOOD PRESSURE: 100 MMHG | HEIGHT: 65 IN | WEIGHT: 188 LBS | BODY MASS INDEX: 31.32 KG/M2

## 2021-10-07 DIAGNOSIS — N76.0 ACUTE VAGINITIS: Primary | ICD-10-CM

## 2021-10-07 DIAGNOSIS — N89.8 VAGINAL IRRITATION: ICD-10-CM

## 2021-10-07 DIAGNOSIS — D64.9 ANEMIA, UNSPECIFIED TYPE: ICD-10-CM

## 2021-10-07 DIAGNOSIS — R73.09 ABNORMAL GLUCOSE: ICD-10-CM

## 2021-10-07 DIAGNOSIS — R31.1 BENIGN ESSENTIAL MICROSCOPIC HEMATURIA: ICD-10-CM

## 2021-10-07 PROCEDURE — 83540 ASSAY OF IRON: CPT

## 2021-10-07 PROCEDURE — 82728 ASSAY OF FERRITIN: CPT

## 2021-10-07 PROCEDURE — 80053 COMPREHEN METABOLIC PANEL: CPT

## 2021-10-07 PROCEDURE — 87086 URINE CULTURE/COLONY COUNT: CPT | Performed by: NURSE PRACTITIONER

## 2021-10-07 PROCEDURE — 3078F DIAST BP <80 MM HG: CPT | Performed by: NURSE PRACTITIONER

## 2021-10-07 PROCEDURE — 3008F BODY MASS INDEX DOCD: CPT | Performed by: NURSE PRACTITIONER

## 2021-10-07 PROCEDURE — 36415 COLL VENOUS BLD VENIPUNCTURE: CPT

## 2021-10-07 PROCEDURE — 99214 OFFICE O/P EST MOD 30 MIN: CPT | Performed by: NURSE PRACTITIONER

## 2021-10-07 PROCEDURE — 85025 COMPLETE CBC W/AUTO DIFF WBC: CPT

## 2021-10-07 PROCEDURE — 83550 IRON BINDING TEST: CPT

## 2021-10-07 PROCEDURE — 3074F SYST BP LT 130 MM HG: CPT | Performed by: NURSE PRACTITIONER

## 2021-10-07 PROCEDURE — 81003 URINALYSIS AUTO W/O SCOPE: CPT | Performed by: NURSE PRACTITIONER

## 2021-10-07 PROCEDURE — 83036 HEMOGLOBIN GLYCOSYLATED A1C: CPT

## 2021-10-07 RX ORDER — FLUCONAZOLE 150 MG/1
150 TABLET ORAL ONCE
Qty: 2 TABLET | Refills: 1 | Status: SHIPPED | OUTPATIENT
Start: 2021-10-07 | End: 2021-10-07

## 2021-10-07 RX ORDER — CLOTRIMAZOLE 1 %
1 CREAM (GRAM) TOPICAL 2 TIMES DAILY
Qty: 60 G | Refills: 1 | Status: SHIPPED | OUTPATIENT
Start: 2021-10-07 | End: 2021-10-17

## 2021-10-07 NOTE — PROGRESS NOTES
Doreen Walters is a 82year old female who presents for vaginal discharge. The patient complains of vaginal irritation . Errol Ramirez Pt has had the sx's for 2 weeks . Pt has itching. There is no unusual odor. Denies dysuria. There is a whitish thick discharge.  Lesly Waller DAY 90 tablet 0   • apixaban (ELIQUIS) 5 MG Oral Tab Take 1 tablet (5 mg total) by mouth 2 (two) times daily. 180 tablet 1   • cyclobenzaprine 5 MG Oral Tab Take 1 tablet (5 mg total) by mouth 3 (three) times daily as needed for Muscle spasms.  30 tablet 0 1/1/74   • INSTILL ANTICANCER AGENT IN BLADDER Right 12/28/2015    Procedure: CYSTOSCOPY WITH RETROGRADE PYELOGRAM;  Surgeon: Solomon Harrison MD;  Location: [address] [place] [address]   • MITOMYCIN 5 MG INJ N/A 12/28/2015    Procedure: Idalia Swanson 84   Resp 16   Ht 5' 5\" (1.651 m)   Wt 188 lb (85.3 kg)   SpO2 98%   BMI 31.28 kg/m²   Body mass index is 31.28 kg/m². GENERAL: well developed, in no apparent distress  SKIN: no rashes,no suspicious lesions  HEENT: moist oral mucosa.   EYES:PERRLA, EOMI,

## 2021-10-11 ENCOUNTER — TELEPHONE (OUTPATIENT)
Dept: FAMILY MEDICINE CLINIC | Facility: CLINIC | Age: 86
End: 2021-10-11

## 2021-10-11 NOTE — TELEPHONE ENCOUNTER
Patient seen on 10/7 for acue vaginitis, microscopic hematuria, and vaginal irritation. Ordered labs and US. Labs resulted: Negative for UTI. Patient wondering if she still needs an US. Soonest US 10/29. Please advise. Thank you.

## 2021-10-11 NOTE — TELEPHONE ENCOUNTER
Pt got results from her urine stating she doesn't have a uti. Pt wondering if she still needs to have an ultrasound? Pt states soonest at any location is 10/29.

## 2021-10-11 NOTE — TELEPHONE ENCOUNTER
Called pt and was made aware of below response from [facility]. Pt states she has an appt with PCP on 10/15 and will discuss this further with her. Questions answered and pt verbalized understanding.

## 2021-10-11 NOTE — TELEPHONE ENCOUNTER
----- Message from LADONNA Sutton sent at 10/11/2021  8:31 AM CDT -----  Anemic - ordered couple labs and stool for occult blood -can  card at the office

## 2021-10-11 NOTE — TELEPHONE ENCOUNTER
Notified the patient of the below lab results and recommendation. Patient verbalized understanding. Patient states that she will come into the office to complete the tests. Answered all questions at this time.

## 2021-10-12 ENCOUNTER — LAB ENCOUNTER (OUTPATIENT)
Dept: LAB | Age: 86
End: 2021-10-12
Attending: NURSE PRACTITIONER
Payer: MEDICARE

## 2021-10-12 DIAGNOSIS — D64.9 ANEMIA, UNSPECIFIED TYPE: ICD-10-CM

## 2021-10-12 PROCEDURE — 82607 VITAMIN B-12: CPT

## 2021-10-12 PROCEDURE — 82746 ASSAY OF FOLIC ACID SERUM: CPT

## 2021-10-12 PROCEDURE — 36415 COLL VENOUS BLD VENIPUNCTURE: CPT

## 2021-10-13 ENCOUNTER — TELEPHONE (OUTPATIENT)
Dept: FAMILY MEDICINE CLINIC | Facility: CLINIC | Age: 86
End: 2021-10-13

## 2021-10-13 DIAGNOSIS — E53.8 B12 DEFICIENCY: Primary | ICD-10-CM

## 2021-10-13 NOTE — TELEPHONE ENCOUNTER
----- Message from Ettie Siemens, APRN sent at 10/13/2021  8:22 AM CDT -----  Vitamin B12 is low normal can have B12 injections weekly x4 weeks , then once per month x 2 months -will recheck it in three months       lmom for pt with results/instructions.

## 2021-10-13 NOTE — TELEPHONE ENCOUNTER
LVM for pt to CB. She is coming for pre-op tomorrow with Dr. Henna Craig. Need to know what kind of surgery and if she has orders.

## 2021-10-13 NOTE — TELEPHONE ENCOUNTER
Called Dr. Madsen Level office, pt having pulmonary wedge resection. Labs are being done at hospital, no EKG.  They are only requesting H&P.

## 2021-10-14 ENCOUNTER — TELEPHONE (OUTPATIENT)
Dept: FAMILY MEDICINE CLINIC | Facility: CLINIC | Age: 86
End: 2021-10-14

## 2021-10-14 NOTE — TELEPHONE ENCOUNTER
Pt called to see if Dr. Alina Perla can see her CT of the chest ordered by Dr. Scarlet Galeazzi.  I informed pt test results are in Epic, she can review & discuss with pt during her 10/15 OV. All questions answered, pt expresses understanding.      Pre-op orders with

## 2021-10-14 NOTE — TELEPHONE ENCOUNTER
Amelia Raphael from [facility] calling to make sure pre-op orders were received yesterday patient has pre op appointment Friday 10/15  Amelia Raphael  Ph# [phone]

## 2021-10-15 ENCOUNTER — OFFICE VISIT (OUTPATIENT)
Dept: FAMILY MEDICINE CLINIC | Facility: CLINIC | Age: 86
End: 2021-10-15
Payer: COMMERCIAL

## 2021-10-15 VITALS
SYSTOLIC BLOOD PRESSURE: 118 MMHG | HEART RATE: 85 BPM | HEIGHT: 65 IN | RESPIRATION RATE: 18 BRPM | WEIGHT: 190 LBS | BODY MASS INDEX: 31.65 KG/M2 | DIASTOLIC BLOOD PRESSURE: 60 MMHG | OXYGEN SATURATION: 95 %

## 2021-10-15 DIAGNOSIS — R91.8 LUNG NODULES: ICD-10-CM

## 2021-10-15 DIAGNOSIS — E53.8 B12 DEFICIENCY: Primary | ICD-10-CM

## 2021-10-15 PROCEDURE — 3008F BODY MASS INDEX DOCD: CPT | Performed by: FAMILY MEDICINE

## 2021-10-15 PROCEDURE — 96372 THER/PROPH/DIAG INJ SC/IM: CPT | Performed by: FAMILY MEDICINE

## 2021-10-15 PROCEDURE — 3074F SYST BP LT 130 MM HG: CPT | Performed by: FAMILY MEDICINE

## 2021-10-15 PROCEDURE — 3078F DIAST BP <80 MM HG: CPT | Performed by: FAMILY MEDICINE

## 2021-10-15 PROCEDURE — 99214 OFFICE O/P EST MOD 30 MIN: CPT | Performed by: FAMILY MEDICINE

## 2021-10-15 RX ORDER — CYANOCOBALAMIN 1000 UG/ML
1000 INJECTION INTRAMUSCULAR; SUBCUTANEOUS ONCE
Status: COMPLETED | OUTPATIENT
Start: 2021-10-15 | End: 2021-10-15

## 2021-10-15 RX ADMIN — CYANOCOBALAMIN 1000 MCG: 1000 INJECTION INTRAMUSCULAR; SUBCUTANEOUS at 14:42:00

## 2021-10-15 NOTE — PROGRESS NOTES
Patient presents with:  Pre-Op: flexible bronchoscopy, left robotic assisted lung resention, possible thoracotomy on 11/1/2021 with Dr. Bernadine [facility].         HPI:   Prop: pt will have lung resection surgery, secondary to large lung nodule do mouth daily. • colchicine 0.6 MG Oral Tab Take 1.2 mg (2 tablets) by mouth at first sign of gout flare then take 0.6 mg (1 tablet) 1 hour later      • TraMADol HCl 50 MG Oral Tab Take 50 mg by mouth every 8 (eight) hours as needed.     0      Past Medic • OTHER SURGICAL HISTORY  01/21/2020    Cysto Dr. En Beltran       Family History   Problem Relation Age of Onset   • Cancer Father         liver   • Diabetes Father    • Heart Disorder Mother    • Diabetes Mother    • Diabetes Sister    • Heart Disorder Sist

## 2021-10-16 DIAGNOSIS — F33.8 SEASONAL DEPRESSION (HCC): ICD-10-CM

## 2021-10-18 ENCOUNTER — TELEPHONE (OUTPATIENT)
Dept: FAMILY MEDICINE CLINIC | Facility: CLINIC | Age: 86
End: 2021-10-18

## 2021-10-18 DIAGNOSIS — R91.1 LUNG NODULE: Primary | ICD-10-CM

## 2021-10-18 RX ORDER — FLUOXETINE HYDROCHLORIDE 20 MG/1
CAPSULE ORAL
Qty: 90 CAPSULE | Refills: 1 | Status: SHIPPED | OUTPATIENT
Start: 2021-10-18 | End: 2022-07-27

## 2021-10-18 NOTE — TELEPHONE ENCOUNTER
Pt very confused. States  wanted her to make an apt with , but pt was told that she already seen [facility] for same issue and they cx'd her apt with . Pt wants to know why she thought  was spking to these providers.

## 2021-10-18 NOTE — TELEPHONE ENCOUNTER
Please see below message. Dr. Barr Shows are with [facility]. OK for pt to continue seeing Dr. Norma Tenorio? Or do you still want pt to see Dr. Josefine Sandhoff?

## 2021-10-18 NOTE — TELEPHONE ENCOUNTER
Patient came in and asked for the FMLA form to be faxed to [facility], fax# [phone]. Patient completed VIKKI. Copy of form along with VIKKI forwarded to Triage (please check if page 2 needs completion as well) to be faxed.  Thank you

## 2021-10-18 NOTE — TELEPHONE ENCOUNTER
Tell pt that her doctor called me and stated that no need to see Dr. Jose Charles since they are partners.  I explained the situation and doctor agree its ok to wait with surgery, [facility] and partners tell pt its

## 2021-10-18 NOTE — TELEPHONE ENCOUNTER
Pt notified of below orders & Dr. Leatha Coleman. Referral placed in Epic. All questions answered, pt expresses understanding.      Dr. Darwin Jj  [address], [address]  [phone]

## 2021-10-19 NOTE — TELEPHONE ENCOUNTER
Reached patient for medication adherence consult. Patient is past due for refill on valsartan-hydrochlorothiazide; last filled 6/22/21 for 90 day supply.  Patient reports she is still taking this medication and located the bottle and reports having plen

## 2021-10-19 NOTE — TELEPHONE ENCOUNTER
FMLA forms completed & faxed back to [facility] & requested fax #. Confirmation received.

## 2021-10-21 ENCOUNTER — NURSE ONLY (OUTPATIENT)
Dept: FAMILY MEDICINE CLINIC | Facility: CLINIC | Age: 86
End: 2021-10-21
Payer: COMMERCIAL

## 2021-10-21 DIAGNOSIS — E53.8 B12 DEFICIENCY: Primary | ICD-10-CM

## 2021-10-21 PROCEDURE — 96372 THER/PROPH/DIAG INJ SC/IM: CPT | Performed by: NURSE PRACTITIONER

## 2021-10-21 RX ORDER — CYANOCOBALAMIN 1000 UG/ML
1000 INJECTION INTRAMUSCULAR; SUBCUTANEOUS ONCE
Status: COMPLETED | OUTPATIENT
Start: 2021-10-21 | End: 2021-10-21

## 2021-10-21 RX ADMIN — CYANOCOBALAMIN 1000 MCG: 1000 INJECTION INTRAMUSCULAR; SUBCUTANEOUS at 10:41:00

## 2021-10-28 ENCOUNTER — NURSE ONLY (OUTPATIENT)
Dept: FAMILY MEDICINE CLINIC | Facility: CLINIC | Age: 86
End: 2021-10-28
Payer: COMMERCIAL

## 2021-10-28 ENCOUNTER — TELEPHONE (OUTPATIENT)
Dept: FAMILY MEDICINE CLINIC | Facility: CLINIC | Age: 86
End: 2021-10-28

## 2021-10-28 DIAGNOSIS — E53.8 B12 DEFICIENCY: Primary | ICD-10-CM

## 2021-10-28 PROCEDURE — 96372 THER/PROPH/DIAG INJ SC/IM: CPT | Performed by: NURSE PRACTITIONER

## 2021-10-28 RX ORDER — CYANOCOBALAMIN 1000 UG/ML
1000 INJECTION INTRAMUSCULAR; SUBCUTANEOUS ONCE
Status: COMPLETED | OUTPATIENT
Start: 2021-10-28 | End: 2021-10-28

## 2021-10-28 RX ADMIN — CYANOCOBALAMIN 1000 MCG: 1000 INJECTION INTRAMUSCULAR; SUBCUTANEOUS at 09:47:00

## 2021-10-28 NOTE — TELEPHONE ENCOUNTER
Glen Rosa from Thoracic Surgery at [facility] called asking if you would sign off on pt's pre-op Clearance from her [address] 10/15, pt has decided to go through with the lung resection surgery & it is scheduled for 11/15. Please advise.      Glen Rosa [phone]

## 2021-10-29 ENCOUNTER — HOSPITAL ENCOUNTER (OUTPATIENT)
Dept: ULTRASOUND IMAGING | Age: 86
Discharge: HOME | End: 2021-10-29
Attending: NURSE PRACTITIONER
Payer: MEDICARE

## 2021-10-29 DIAGNOSIS — R31.1 BENIGN ESSENTIAL MICROSCOPIC HEMATURIA: ICD-10-CM

## 2021-10-29 PROCEDURE — 76770 US EXAM ABDO BACK WALL COMP: CPT | Performed by: NURSE PRACTITIONER

## 2021-10-29 NOTE — TELEPHONE ENCOUNTER
Updated note faxed to [address] [facility]. Fax confirmation received. Will keep in Dr. Sandra Bess folder with pink sheet and original order.

## 2021-10-30 DIAGNOSIS — M17.11 PRIMARY OSTEOARTHRITIS OF RIGHT KNEE: ICD-10-CM

## 2021-11-01 RX ORDER — METOPROLOL SUCCINATE 50 MG/1
TABLET, EXTENDED RELEASE ORAL
Qty: 90 TABLET | Refills: 1 | Status: SHIPPED | OUTPATIENT
Start: 2021-11-01

## 2021-11-03 ENCOUNTER — TELEPHONE (OUTPATIENT)
Dept: FAMILY MEDICINE CLINIC | Facility: CLINIC | Age: 86
End: 2021-11-03

## 2021-11-03 NOTE — TELEPHONE ENCOUNTER
Pt asking for US kidney/bladder results, pt notified of normal results. All questions answered, pt expresses understanding.

## 2021-11-04 ENCOUNTER — NURSE ONLY (OUTPATIENT)
Dept: FAMILY MEDICINE CLINIC | Facility: CLINIC | Age: 86
End: 2021-11-04
Payer: COMMERCIAL

## 2021-11-04 DIAGNOSIS — E53.8 B12 DEFICIENCY: Primary | ICD-10-CM

## 2021-11-04 PROCEDURE — 96372 THER/PROPH/DIAG INJ SC/IM: CPT | Performed by: NURSE PRACTITIONER

## 2021-11-04 RX ORDER — CYANOCOBALAMIN 1000 UG/ML
1000 INJECTION INTRAMUSCULAR; SUBCUTANEOUS ONCE
Status: COMPLETED | OUTPATIENT
Start: 2021-11-04 | End: 2021-11-04

## 2021-11-04 RX ADMIN — CYANOCOBALAMIN 1000 MCG: 1000 INJECTION INTRAMUSCULAR; SUBCUTANEOUS at 09:46:00

## 2021-11-11 ENCOUNTER — NURSE ONLY (OUTPATIENT)
Dept: FAMILY MEDICINE CLINIC | Facility: CLINIC | Age: 86
End: 2021-11-11
Payer: COMMERCIAL

## 2021-11-11 DIAGNOSIS — E53.8 B12 DEFICIENCY: Primary | ICD-10-CM

## 2021-11-11 PROCEDURE — 96372 THER/PROPH/DIAG INJ SC/IM: CPT | Performed by: NURSE PRACTITIONER

## 2021-11-11 RX ORDER — CYANOCOBALAMIN 1000 UG/ML
1000 INJECTION INTRAMUSCULAR; SUBCUTANEOUS ONCE
Status: COMPLETED | OUTPATIENT
Start: 2021-11-11 | End: 2021-11-11

## 2021-11-11 RX ADMIN — CYANOCOBALAMIN 1000 MCG: 1000 INJECTION INTRAMUSCULAR; SUBCUTANEOUS at 09:40:00

## 2021-11-26 ENCOUNTER — HOSPITAL ENCOUNTER (OUTPATIENT)
Age: 86
Discharge: HOME | End: 2021-11-26
Payer: MEDICARE

## 2021-11-26 ENCOUNTER — APPOINTMENT (OUTPATIENT)
Dept: ULTRASOUND IMAGING | Age: 86
End: 2021-11-26
Attending: PHYSICIAN ASSISTANT
Payer: MEDICARE

## 2021-11-26 ENCOUNTER — APPOINTMENT (OUTPATIENT)
Dept: GENERAL RADIOLOGY | Age: 86
End: 2021-11-26
Attending: PHYSICIAN ASSISTANT
Payer: MEDICARE

## 2021-11-26 VITALS
SYSTOLIC BLOOD PRESSURE: 141 MMHG | OXYGEN SATURATION: 100 % | RESPIRATION RATE: 16 BRPM | BODY MASS INDEX: 30.82 KG/M2 | WEIGHT: 185 LBS | HEART RATE: 62 BPM | HEIGHT: 65 IN | DIASTOLIC BLOOD PRESSURE: 55 MMHG | TEMPERATURE: 98 F

## 2021-11-26 DIAGNOSIS — I83.812 VARICOSE VEINS OF LEFT LOWER EXTREMITY WITH PAIN: ICD-10-CM

## 2021-11-26 DIAGNOSIS — M10.9 ACUTE GOUT INVOLVING TOE OF LEFT FOOT, UNSPECIFIED CAUSE: Primary | ICD-10-CM

## 2021-11-26 DIAGNOSIS — M19.072 PRIMARY OSTEOARTHRITIS OF LEFT FOOT: ICD-10-CM

## 2021-11-26 PROCEDURE — 73630 X-RAY EXAM OF FOOT: CPT | Performed by: PHYSICIAN ASSISTANT

## 2021-11-26 PROCEDURE — 93971 EXTREMITY STUDY: CPT | Performed by: PHYSICIAN ASSISTANT

## 2021-11-26 PROCEDURE — 99214 OFFICE O/P EST MOD 30 MIN: CPT

## 2021-11-26 RX ORDER — METHYLPREDNISOLONE 4 MG/1
TABLET ORAL
Qty: 1 EACH | Refills: 0 | Status: SHIPPED | OUTPATIENT
Start: 2021-11-26 | End: 2021-12-02 | Stop reason: ALTCHOICE

## 2021-11-26 NOTE — ED PROVIDER NOTES
Patient Seen in: [facility]      History   Patient presents with:   Foot Pain    Stated Complaint: Left foot pain 2 days    Subjective:   HPI    32-year-old female who comes in today complaining of left foot pain in the first MTP and third OTHER  1/1/99    gastrotomy with suture of bleeding ulcer   • OTHER SURGICAL HISTORY  12/1/15    [facility] Cysto - Dr. Shiv Wright   • OTHER SURGICAL HISTORY  4/19/16    BTS CystoJames Wright   • OTHER SURGICAL HISTORY  7/12/16    BTS Hector Wright   • Isabel Salcedo Breath sounds: Normal breath sounds. No wheezing or rales. Musculoskeletal:      Cervical back: Normal range of motion.       Comments: Left lower extremities with significant varicosities around the ankle and medial aspect of the foot tenderness to Finalized by (CST): Laurence Calles MD on 11/26/2021 at 4:47 PM       US VENOUS DOPPLER LEG LEFT - DIAG IMG (VVH=76450)    Result Date: 11/26/2021  PROCEDURE:  US VENOUS DOPPLER LEG LEFT - DIAG IMG (CPT=93971)  COMPARISON:  US KAL[place]NA, US VENOUS DOPPLER LEG L Ynes Jama, FRANCISCO  [address] [address] [phone]    Schedule an appointment as soon as possible for a visit   If symptoms worsen          Medications Prescribed:  Discharge Medication List as of 11/26/2021  5:31 PM    START Jeremias Bray

## 2021-11-29 NOTE — TELEPHONE ENCOUNTER
Medication(s) to Refill:   Requested Prescriptions     Pending Prescriptions Disp Refills   • FOLIC ACID 1 MG Oral Tab [Pharmacy Med Name: FOLIC ACID 1MG TABLETS] 90 tablet 0     Sig: TAKE 1 TABLET BY MOUTH EVERY DAY   • SIMVASTATIN 20 MG Oral Tab [Pharmac

## 2021-11-30 RX ORDER — FOLIC ACID 1 MG/1
TABLET ORAL
Qty: 90 TABLET | Refills: 0 | Status: SHIPPED | OUTPATIENT
Start: 2021-11-30

## 2021-11-30 RX ORDER — SIMVASTATIN 20 MG
TABLET ORAL
Qty: 90 TABLET | Refills: 0 | Status: SHIPPED | OUTPATIENT
Start: 2021-11-30

## 2021-12-09 ENCOUNTER — NURSE ONLY (OUTPATIENT)
Dept: FAMILY MEDICINE CLINIC | Facility: CLINIC | Age: 86
End: 2021-12-09
Payer: COMMERCIAL

## 2021-12-09 DIAGNOSIS — E53.8 B12 DEFICIENCY: Primary | ICD-10-CM

## 2021-12-09 PROCEDURE — 96372 THER/PROPH/DIAG INJ SC/IM: CPT | Performed by: NURSE PRACTITIONER

## 2021-12-09 RX ORDER — CYANOCOBALAMIN 1000 UG/ML
1000 INJECTION INTRAMUSCULAR; SUBCUTANEOUS ONCE
Status: COMPLETED | OUTPATIENT
Start: 2021-12-09 | End: 2021-12-09

## 2021-12-09 RX ADMIN — CYANOCOBALAMIN 1000 MCG: 1000 INJECTION INTRAMUSCULAR; SUBCUTANEOUS at 09:25:00

## 2021-12-23 ENCOUNTER — LAB ENCOUNTER (OUTPATIENT)
Dept: LAB | Age: 86
End: 2021-12-23
Attending: FAMILY MEDICINE
Payer: MEDICARE

## 2021-12-23 DIAGNOSIS — R79.89 ELEVATED SERUM CREATININE: ICD-10-CM

## 2021-12-23 DIAGNOSIS — I26.94 MULTIPLE SUBSEGMENTAL PULMONARY EMBOLI WITHOUT ACUTE COR PULMONALE (HCC): ICD-10-CM

## 2021-12-23 PROCEDURE — 80048 BASIC METABOLIC PNL TOTAL CA: CPT

## 2021-12-23 PROCEDURE — 36415 COLL VENOUS BLD VENIPUNCTURE: CPT

## 2021-12-23 PROCEDURE — 83880 ASSAY OF NATRIURETIC PEPTIDE: CPT

## 2021-12-27 ENCOUNTER — TELEPHONE (OUTPATIENT)
Dept: FAMILY MEDICINE CLINIC | Facility: CLINIC | Age: 86
End: 2021-12-27

## 2021-12-27 NOTE — TELEPHONE ENCOUNTER
----- Message from Yonis Chua MD sent at 12/26/2021 12:41 PM CST -----  Normal results.     Spoke to pt with results

## 2022-01-10 DIAGNOSIS — E03.9 ACQUIRED HYPOTHYROIDISM: ICD-10-CM

## 2022-01-10 RX ORDER — LEVOTHYROXINE SODIUM 50 MCG
TABLET ORAL
Qty: 90 TABLET | Refills: 1 | Status: SHIPPED | OUTPATIENT
Start: 2022-01-10

## 2022-01-11 ENCOUNTER — LAB ENCOUNTER (OUTPATIENT)
Dept: LAB | Age: 87
End: 2022-01-11
Attending: NURSE PRACTITIONER
Payer: MEDICARE

## 2022-01-11 ENCOUNTER — OFFICE VISIT (OUTPATIENT)
Dept: FAMILY MEDICINE CLINIC | Facility: CLINIC | Age: 87
End: 2022-01-11
Payer: COMMERCIAL

## 2022-01-11 ENCOUNTER — EKG ENCOUNTER (OUTPATIENT)
Dept: LAB | Age: 87
End: 2022-01-11
Attending: NURSE PRACTITIONER
Payer: MEDICARE

## 2022-01-11 ENCOUNTER — TELEPHONE (OUTPATIENT)
Dept: FAMILY MEDICINE CLINIC | Facility: CLINIC | Age: 87
End: 2022-01-11

## 2022-01-11 VITALS
WEIGHT: 191 LBS | BODY MASS INDEX: 31.82 KG/M2 | SYSTOLIC BLOOD PRESSURE: 122 MMHG | HEIGHT: 65 IN | DIASTOLIC BLOOD PRESSURE: 60 MMHG

## 2022-01-11 DIAGNOSIS — M10.9 ACUTE GOUT OF LEFT HAND, UNSPECIFIED CAUSE: ICD-10-CM

## 2022-01-11 DIAGNOSIS — Z01.818 PREOPERATIVE GENERAL PHYSICAL EXAMINATION: ICD-10-CM

## 2022-01-11 DIAGNOSIS — Z87.440 HISTORY OF UTI: ICD-10-CM

## 2022-01-11 DIAGNOSIS — I10 ESSENTIAL HYPERTENSION, BENIGN: ICD-10-CM

## 2022-01-11 DIAGNOSIS — Z01.818 PREOPERATIVE GENERAL PHYSICAL EXAMINATION: Primary | ICD-10-CM

## 2022-01-11 DIAGNOSIS — E53.8 VITAMIN B12 DEFICIENCY: ICD-10-CM

## 2022-01-11 LAB
ALBUMIN SERPL-MCNC: 3.4 G/DL (ref 3.4–5)
ALBUMIN/GLOB SERPL: 1.2 {RATIO} (ref 1–2)
ALP LIVER SERPL-CCNC: 46 U/L
ALT SERPL-CCNC: 18 U/L
ANION GAP SERPL CALC-SCNC: 7 MMOL/L (ref 0–18)
AST SERPL-CCNC: 16 U/L (ref 15–37)
ATRIAL RATE: 68 BPM
BASOPHILS # BLD AUTO: 0.06 X10(3) UL (ref 0–0.2)
BASOPHILS NFR BLD AUTO: 0.9 %
BILIRUB SERPL-MCNC: 0.5 MG/DL (ref 0.1–2)
BUN BLD-MCNC: 32 MG/DL (ref 7–18)
CALCIUM BLD-MCNC: 8.9 MG/DL (ref 8.5–10.1)
CHLORIDE SERPL-SCNC: 104 MMOL/L (ref 98–112)
CO2 SERPL-SCNC: 26 MMOL/L (ref 21–32)
CREAT BLD-MCNC: 1.27 MG/DL
EOSINOPHIL # BLD AUTO: 0.21 X10(3) UL (ref 0–0.7)
EOSINOPHIL NFR BLD AUTO: 3.3 %
ERYTHROCYTE [DISTWIDTH] IN BLOOD BY AUTOMATED COUNT: 13.2 %
FASTING STATUS PATIENT QL REPORTED: YES
FOLATE SERPL-MCNC: 91.4 NG/ML (ref 8.7–?)
GLOBULIN PLAS-MCNC: 2.9 G/DL (ref 2.8–4.4)
GLUCOSE BLD-MCNC: 104 MG/DL (ref 70–99)
HCT VFR BLD AUTO: 35.8 %
HGB BLD-MCNC: 11.1 G/DL
IMM GRANULOCYTES # BLD AUTO: 0.05 X10(3) UL (ref 0–1)
IMM GRANULOCYTES NFR BLD: 0.8 %
LYMPHOCYTES # BLD AUTO: 1.68 X10(3) UL (ref 1–4)
LYMPHOCYTES NFR BLD AUTO: 26.4 %
MCH RBC QN AUTO: 32.6 PG (ref 26–34)
MCHC RBC AUTO-ENTMCNC: 31 G/DL (ref 31–37)
MCV RBC AUTO: 105.3 FL
MONOCYTES # BLD AUTO: 0.95 X10(3) UL (ref 0.1–1)
MONOCYTES NFR BLD AUTO: 14.9 %
NEUTROPHILS # BLD AUTO: 3.42 X10 (3) UL (ref 1.5–7.7)
NEUTROPHILS # BLD AUTO: 3.42 X10(3) UL (ref 1.5–7.7)
NEUTROPHILS NFR BLD AUTO: 53.7 %
OSMOLALITY SERPL CALC.SUM OF ELEC: 291 MOSM/KG (ref 275–295)
P AXIS: 59 DEGREES
P-R INTERVAL: 156 MS
PLATELET # BLD AUTO: 199 10(3)UL (ref 150–450)
POTASSIUM SERPL-SCNC: 3.7 MMOL/L (ref 3.5–5.1)
PROT SERPL-MCNC: 6.3 G/DL (ref 6.4–8.2)
Q-T INTERVAL: 412 MS
QRS DURATION: 90 MS
QTC CALCULATION (BEZET): 438 MS
R AXIS: 58 DEGREES
RBC # BLD AUTO: 3.4 X10(6)UL
SODIUM SERPL-SCNC: 137 MMOL/L (ref 136–145)
T AXIS: 59 DEGREES
URATE SERPL-MCNC: 9.7 MG/DL
VENTRICULAR RATE: 68 BPM
VIT B12 SERPL-MCNC: 896 PG/ML (ref 193–986)
WBC # BLD AUTO: 6.4 X10(3) UL (ref 4–11)

## 2022-01-11 PROCEDURE — 3008F BODY MASS INDEX DOCD: CPT | Performed by: NURSE PRACTITIONER

## 2022-01-11 PROCEDURE — 84550 ASSAY OF BLOOD/URIC ACID: CPT

## 2022-01-11 PROCEDURE — 93010 ELECTROCARDIOGRAM REPORT: CPT | Performed by: INTERNAL MEDICINE

## 2022-01-11 PROCEDURE — 82746 ASSAY OF FOLIC ACID SERUM: CPT

## 2022-01-11 PROCEDURE — 87086 URINE CULTURE/COLONY COUNT: CPT | Performed by: NURSE PRACTITIONER

## 2022-01-11 PROCEDURE — 82607 VITAMIN B-12: CPT

## 2022-01-11 PROCEDURE — 85025 COMPLETE CBC W/AUTO DIFF WBC: CPT

## 2022-01-11 PROCEDURE — 93005 ELECTROCARDIOGRAM TRACING: CPT

## 2022-01-11 PROCEDURE — 3074F SYST BP LT 130 MM HG: CPT | Performed by: NURSE PRACTITIONER

## 2022-01-11 PROCEDURE — 3078F DIAST BP <80 MM HG: CPT | Performed by: NURSE PRACTITIONER

## 2022-01-11 PROCEDURE — 36415 COLL VENOUS BLD VENIPUNCTURE: CPT

## 2022-01-11 PROCEDURE — 80053 COMPREHEN METABOLIC PANEL: CPT

## 2022-01-11 PROCEDURE — 99214 OFFICE O/P EST MOD 30 MIN: CPT | Performed by: NURSE PRACTITIONER

## 2022-01-11 RX ORDER — METHYLPREDNISOLONE 4 MG/1
TABLET ORAL
Qty: 21 EACH | Refills: 0 | Status: SHIPPED | OUTPATIENT
Start: 2022-01-11

## 2022-01-11 NOTE — TELEPHONE ENCOUNTER
Patient was in for office visit today and is requesting her [facility] paper work be extended. For her daughter to care for her post surgery 8hrs daily for 5 days. Please advise.

## 2022-01-11 NOTE — TELEPHONE ENCOUNTER
Tell pt surgery will fill it out for the pt, it has many surgical questions those forms.  I am not surgeon

## 2022-01-11 NOTE — TELEPHONE ENCOUNTER
Pt informed of below info. Pt states her dtr just needs time off to take her to appts. I informed pt that paperwork has already been completed for 1-2 days per week for appts through 2/15.   If she needs more days off than that, FMLA will have to come fro

## 2022-01-11 NOTE — TELEPHONE ENCOUNTER
You have been doing intermittent FMLA for dtr to take pt to & from appts 1-2 days a week. Pt is to have Left robotic assisted wedge resection , possible lobectomy , possible thoracotomy.  to be on  1/24/22, pt is asking if you will do dtr's FMLA for contin

## 2022-01-11 NOTE — PATIENT INSTRUCTIONS
Gout    Gout is an inflammation of a joint caused by an inflammatory response to gout crystals in the joint fluid. This occurs when there is excess uric acid. Uric acid is a normal waste product in the body.  It builds up in the body when the kidneys ca medicine to treat gout, take it as your healthcare provider has instructed. Don't skip doses. · Take anti-inflammatory medicine as directed by your healthcare provider.   · If pain medicines have been prescribed, take them exactly as directed.    Preventin

## 2022-01-11 NOTE — PROGRESS NOTES
CC: Preop exam.    Larry Camejo is a 82year old female who presents for a pre-operative physical exam  By Jonathan Ahn   request. Patient is to have Left robotic assisted wedge resection , possible lobectomy , possible thoracotomy.  to be on  1/24/22  No p Comment:hives  Penicillins                 Comment:hives  Codeine                 RASH   Past Medical History:   Diagnosis Date   • Arthritis     psoriatic   • Back problem    • Bladder cancer ([facility] [address] 9/2/15    TURBT - Dr. John Gannon   • Disorder of thyroid Diabetes Father    • Heart Disorder Mother    • Diabetes Mother    • Diabetes Sister    • Heart Disorder Sister    • Diabetes Brother       Social History:   Social History    Tobacco Use      Smoking status: Former Smoker        Packs/day: 1.00        Yea Janifer Cabot is a 82year old female who presents for a pre-operative physical exam.   Patient cleared for surgery , labs are stable   EKG normal , no acute ischemia     Diagnoses and all orders for this visit:    Preoperative general physical examination  -     C

## 2022-01-12 ENCOUNTER — TELEPHONE (OUTPATIENT)
Dept: FAMILY MEDICINE CLINIC | Facility: CLINIC | Age: 87
End: 2022-01-12

## 2022-01-12 NOTE — TELEPHONE ENCOUNTER
----- Message from LADONNA Gutierrez sent at 1/12/2022  2:54 PM CST -----  Ekg is good can fax preop with labs

## 2022-01-12 NOTE — TELEPHONE ENCOUNTER
Pre op informations faxed to [facility] as requested    [facility]  [phone](UMQ)  [phone] (phone)

## 2022-01-12 NOTE — TELEPHONE ENCOUNTER
----- Message from LADONNA Bobo sent at 1/12/2022  2:54 PM CST -----  Ekg is good can fax preop with labs

## 2022-01-12 NOTE — TELEPHONE ENCOUNTER
----- Message from LADONNA Cruz sent at 1/12/2022  2:53 PM CST -----  Labs stable , increase water intake , uric acid elevated from gout exacerbation

## 2022-01-12 NOTE — TELEPHONE ENCOUNTER
Spoke with patient, informed her aof all lab results and provider recommendation for increase water intake. Verbalized understanding.

## 2022-01-13 ENCOUNTER — TELEPHONE (OUTPATIENT)
Dept: FAMILY MEDICINE CLINIC | Facility: CLINIC | Age: 87
End: 2022-01-13

## 2022-01-24 NOTE — TELEPHONE ENCOUNTER
Dtr is asking if her Intermittent FMLA can be extended, pt is having surgery again. Right now it goes to 2/15. OK to extend FMLA?   LOV 11/11

## 2022-01-24 NOTE — TELEPHONE ENCOUNTER
Daughter called and is asking that the date be extended to as long as possible. Pls Call Jaquan Sun  [phone].

## 2022-01-27 NOTE — TELEPHONE ENCOUNTER
Int FMLA extended through 4/15, [facility] notified. Forms faxed back to [facility] at [phone], confirmation received.

## 2022-01-28 DIAGNOSIS — I10 ESSENTIAL HYPERTENSION, BENIGN: ICD-10-CM

## 2022-01-28 RX ORDER — VALSARTAN AND HYDROCHLOROTHIAZIDE 160; 25 MG/1; MG/1
1 TABLET ORAL DAILY
Qty: 90 TABLET | Refills: 1 | Status: SHIPPED | OUTPATIENT
Start: 2022-01-28

## 2022-01-31 RX ORDER — FUROSEMIDE 20 MG/1
TABLET ORAL
Qty: 30 TABLET | Refills: 3 | Status: SHIPPED | OUTPATIENT
Start: 2022-01-31

## 2022-02-02 ENCOUNTER — TELEPHONE (OUTPATIENT)
Dept: HEMATOLOGY/ONCOLOGY | Facility: HOSPITAL | Age: 87
End: 2022-02-02

## 2022-02-09 ENCOUNTER — OFFICE VISIT (OUTPATIENT)
Dept: HEMATOLOGY/ONCOLOGY | Facility: HOSPITAL | Age: 87
End: 2022-02-09
Attending: THORACIC SURGERY (CARDIOTHORACIC VASCULAR SURGERY)
Payer: MEDICARE

## 2022-02-09 VITALS
WEIGHT: 190 LBS | TEMPERATURE: 98 F | DIASTOLIC BLOOD PRESSURE: 70 MMHG | RESPIRATION RATE: 18 BRPM | BODY MASS INDEX: 32 KG/M2 | SYSTOLIC BLOOD PRESSURE: 112 MMHG | OXYGEN SATURATION: 98 % | HEART RATE: 77 BPM

## 2022-02-09 DIAGNOSIS — D64.9 NORMOCYTIC NORMOCHROMIC ANEMIA: ICD-10-CM

## 2022-02-09 DIAGNOSIS — I82.90 VTE (VENOUS THROMBOEMBOLISM): ICD-10-CM

## 2022-02-09 DIAGNOSIS — C34.92 SQUAMOUS CELL CARCINOMA OF LEFT LUNG (HCC): ICD-10-CM

## 2022-02-09 DIAGNOSIS — C34.90 SMALL CELL LUNG CANCER (HCC): Primary | ICD-10-CM

## 2022-02-09 DIAGNOSIS — D50.0 IRON DEFICIENCY ANEMIA SECONDARY TO BLOOD LOSS (CHRONIC): ICD-10-CM

## 2022-02-09 DIAGNOSIS — C67.8 MALIGNANT NEOPLASM OF OVERLAPPING SITES OF BLADDER (HCC): ICD-10-CM

## 2022-02-09 PROCEDURE — 99215 OFFICE O/P EST HI 40 MIN: CPT | Performed by: INTERNAL MEDICINE

## 2022-02-09 NOTE — PROGRESS NOTES
Outpatient Oncology Care Plan  Problem list:  knowledge deficit    Problems related to:    disease/disease progression    Interventions:  provided general teaching    Expected outcomes:  understands plan of care    Progress towards outcome:  resolved    Education Record    Learner:  Patient  Barriers / Limitations:  None  Method:  Reinforcement  Outcome:  Shows understanding  Comments:

## 2022-02-09 NOTE — PATIENT INSTRUCTIONS
For triage nurse: [phone] Monday through Friday 7:30-5:00.  *Please note this is a new phone number*    After hours or weekends for emergent needs:  [phone].      To schedule diagnostic testing: Central [phone]    For Medical Records: [phone]

## 2022-02-10 ENCOUNTER — TELEPHONE (OUTPATIENT)
Dept: HEMATOLOGY/ONCOLOGY | Facility: HOSPITAL | Age: 87
End: 2022-02-10

## 2022-02-10 ENCOUNTER — SOCIAL WORK SERVICES (OUTPATIENT)
Dept: HEMATOLOGY/ONCOLOGY | Facility: HOSPITAL | Age: 87
End: 2022-02-10

## 2022-02-10 PROBLEM — C34.90 SMALL CELL LUNG CANCER (HCC): Status: ACTIVE | Noted: 2022-02-10

## 2022-02-10 NOTE — TELEPHONE ENCOUNTER
Patients daughter Juanda Bison calling. She is looking for assistance with [place] paperwork, so she can care for her mother.     Stated it is OK to leave a VM

## 2022-02-10 NOTE — PROGRESS NOTES
called and left a message for Daughter Jayesh Diaz to discuss FMLA needs. Direct Contact provided;  to remain available as needed.

## 2022-02-11 ENCOUNTER — SOCIAL WORK SERVICES (OUTPATIENT)
Dept: HEMATOLOGY/ONCOLOGY | Facility: HOSPITAL | Age: 87
End: 2022-02-11

## 2022-02-11 NOTE — PROGRESS NOTES
spoke with Daughter and confirmed need for Int FMLA (Tx: 3days per week, every 3weeks. Flare-ups: Up to 5days, up to 8hrs per day). Daughter to submit request and provide paperwork to  when obtained.

## 2022-02-15 ENCOUNTER — OFFICE VISIT (OUTPATIENT)
Dept: HEMATOLOGY/ONCOLOGY | Age: 87
End: 2022-02-15
Attending: THORACIC SURGERY (CARDIOTHORACIC VASCULAR SURGERY)
Payer: MEDICARE

## 2022-02-15 DIAGNOSIS — C34.90 SMALL CELL LUNG CANCER (HCC): Primary | ICD-10-CM

## 2022-02-15 DIAGNOSIS — Z71.9 HEALTH EDUCATION/COUNSELING: ICD-10-CM

## 2022-02-15 PROCEDURE — 99215 OFFICE O/P EST HI 40 MIN: CPT | Performed by: NURSE PRACTITIONER

## 2022-02-15 RX ORDER — PROCHLORPERAZINE MALEATE 10 MG
10 TABLET ORAL EVERY 6 HOURS PRN
Qty: 30 TABLET | Refills: 3 | Status: SHIPPED | OUTPATIENT
Start: 2022-02-15

## 2022-02-15 RX ORDER — ONDANSETRON HYDROCHLORIDE 8 MG/1
8 TABLET, FILM COATED ORAL EVERY 8 HOURS PRN
Qty: 30 TABLET | Refills: 3 | Status: SHIPPED | OUTPATIENT
Start: 2022-02-15

## 2022-02-16 ENCOUNTER — HOSPITAL ENCOUNTER (OUTPATIENT)
Dept: MRI IMAGING | Age: 87
Discharge: HOME | End: 2022-02-16
Attending: INTERNAL MEDICINE
Payer: MEDICARE

## 2022-02-16 ENCOUNTER — TELEPHONE (OUTPATIENT)
Dept: HEMATOLOGY/ONCOLOGY | Facility: HOSPITAL | Age: 87
End: 2022-02-16

## 2022-02-16 DIAGNOSIS — C34.90 SMALL CELL LUNG CANCER (HCC): ICD-10-CM

## 2022-02-16 PROCEDURE — 70553 MRI BRAIN STEM W/O & W/DYE: CPT | Performed by: INTERNAL MEDICINE

## 2022-02-16 PROCEDURE — A9575 INJ GADOTERATE MEGLUMI 0.1ML: HCPCS | Performed by: INTERNAL MEDICINE

## 2022-02-22 ENCOUNTER — OFFICE VISIT (OUTPATIENT)
Dept: HEMATOLOGY/ONCOLOGY | Age: 87
End: 2022-02-22
Attending: THORACIC SURGERY (CARDIOTHORACIC VASCULAR SURGERY)
Payer: MEDICARE

## 2022-02-22 VITALS
WEIGHT: 189.81 LBS | HEIGHT: 64 IN | BODY MASS INDEX: 32.41 KG/M2 | DIASTOLIC BLOOD PRESSURE: 67 MMHG | HEART RATE: 68 BPM | OXYGEN SATURATION: 97 % | SYSTOLIC BLOOD PRESSURE: 113 MMHG | TEMPERATURE: 99 F

## 2022-02-22 DIAGNOSIS — C34.90 SMALL CELL LUNG CANCER (HCC): Primary | ICD-10-CM

## 2022-02-22 LAB
ALBUMIN SERPL-MCNC: 3.4 G/DL (ref 3.4–5)
ALBUMIN/GLOB SERPL: 1 {RATIO} (ref 1–2)
ALP LIVER SERPL-CCNC: 50 U/L
ALT SERPL-CCNC: 13 U/L
ANION GAP SERPL CALC-SCNC: 10 MMOL/L (ref 0–18)
AST SERPL-CCNC: 12 U/L (ref 15–37)
BASOPHILS # BLD AUTO: 0.08 X10(3) UL (ref 0–0.2)
BASOPHILS NFR BLD AUTO: 1.1 %
BILIRUB SERPL-MCNC: 0.4 MG/DL (ref 0.1–2)
BUN BLD-MCNC: 31 MG/DL (ref 7–18)
CALCIUM BLD-MCNC: 9.2 MG/DL (ref 8.5–10.1)
CHLORIDE SERPL-SCNC: 106 MMOL/L (ref 98–112)
CO2 SERPL-SCNC: 25 MMOL/L (ref 21–32)
CREAT BLD-MCNC: 1.45 MG/DL
DEPRECATED HBV CORE AB SER IA-ACNC: 38.2 NG/ML
EOSINOPHIL # BLD AUTO: 0.7 X10(3) UL (ref 0–0.7)
EOSINOPHIL NFR BLD AUTO: 9.4 %
ERYTHROCYTE [DISTWIDTH] IN BLOOD BY AUTOMATED COUNT: 13.2 %
FASTING STATUS PATIENT QL REPORTED: NO
GLOBULIN PLAS-MCNC: 3.4 G/DL (ref 2.8–4.4)
GLUCOSE BLD-MCNC: 112 MG/DL (ref 70–99)
HCT VFR BLD AUTO: 35.1 %
HGB BLD-MCNC: 11.5 G/DL
IMM GRANULOCYTES # BLD AUTO: 0.04 X10(3) UL (ref 0–1)
IMM GRANULOCYTES NFR BLD: 0.5 %
LDH SERPL L TO P-CCNC: 164 U/L
LYMPHOCYTES # BLD AUTO: 2.31 X10(3) UL (ref 1–4)
LYMPHOCYTES NFR BLD AUTO: 31 %
MCH RBC QN AUTO: 32.2 PG (ref 26–34)
MCHC RBC AUTO-ENTMCNC: 32.8 G/DL (ref 31–37)
MCV RBC AUTO: 98.3 FL
MONOCYTES # BLD AUTO: 1.03 X10(3) UL (ref 0.1–1)
MONOCYTES NFR BLD AUTO: 13.8 %
NEUTROPHILS # BLD AUTO: 3.28 X10 (3) UL (ref 1.5–7.7)
NEUTROPHILS # BLD AUTO: 3.28 X10(3) UL (ref 1.5–7.7)
NEUTROPHILS NFR BLD AUTO: 44.2 %
OSMOLALITY SERPL CALC.SUM OF ELEC: 299 MOSM/KG (ref 275–295)
PLATELET # BLD AUTO: 265 10(3)UL (ref 150–450)
POTASSIUM SERPL-SCNC: 3.9 MMOL/L (ref 3.5–5.1)
PROT SERPL-MCNC: 6.8 G/DL (ref 6.4–8.2)
RBC # BLD AUTO: 3.57 X10(6)UL
SODIUM SERPL-SCNC: 141 MMOL/L (ref 136–145)
WBC # BLD AUTO: 7.4 X10(3) UL (ref 4–11)

## 2022-02-22 PROCEDURE — 82728 ASSAY OF FERRITIN: CPT

## 2022-02-22 PROCEDURE — 96413 CHEMO IV INFUSION 1 HR: CPT

## 2022-02-22 PROCEDURE — 96375 TX/PRO/DX INJ NEW DRUG ADDON: CPT

## 2022-02-22 PROCEDURE — 80053 COMPREHEN METABOLIC PANEL: CPT

## 2022-02-22 PROCEDURE — 85025 COMPLETE CBC W/AUTO DIFF WBC: CPT

## 2022-02-22 PROCEDURE — 96417 CHEMO IV INFUS EACH ADDL SEQ: CPT

## 2022-02-22 PROCEDURE — 83615 LACTATE (LD) (LDH) ENZYME: CPT

## 2022-02-22 PROCEDURE — 36415 COLL VENOUS BLD VENIPUNCTURE: CPT

## 2022-02-22 NOTE — PROGRESS NOTES
Pt here for C1D1. Arrives Ambulating with cane, accompanied by Self and Family member           Pregnancy screening: Not applicable    Modifications in dose or schedule: No     Frequency of blood return and site check throughout administration: Prior to administration and At completion of therapy   Discharged to Home, Ambulating with cane, accompanied by:Self and Family member    Outpatient Oncology Care Plan  Problem list:  knowledge deficit  Problems related to:    chemotherapy  Interventions:  emotional support given  maintain safe environment  nausea/vomiting teaching  patient/family supported  chemotherapy teaching  maintain skin integrity  monitor effect of therapy  monitor lab values  promoted rest  provided general teaching  Expected outcomes:  understands plan of care  Progress towards outcome:  making progress    Education Record    Learner:  Patient and Family Member  Barriers / Limitations:  None  Method:  Discussion  Outcome:  Shows understanding  Comments: Patient ambulatory with cane. Arrived with daughter. Tolerated treatment today. Reviewed anti nausea medication with patient and daughter. Patient left PIV in for tomorrow's treatment.

## 2022-02-23 ENCOUNTER — OFFICE VISIT (OUTPATIENT)
Dept: HEMATOLOGY/ONCOLOGY | Age: 87
End: 2022-02-23
Attending: THORACIC SURGERY (CARDIOTHORACIC VASCULAR SURGERY)
Payer: MEDICARE

## 2022-02-23 VITALS
OXYGEN SATURATION: 99 % | HEART RATE: 62 BPM | TEMPERATURE: 98 F | DIASTOLIC BLOOD PRESSURE: 82 MMHG | RESPIRATION RATE: 18 BRPM | SYSTOLIC BLOOD PRESSURE: 131 MMHG

## 2022-02-23 DIAGNOSIS — C34.90 SMALL CELL LUNG CANCER (HCC): Primary | ICD-10-CM

## 2022-02-23 PROCEDURE — 96413 CHEMO IV INFUSION 1 HR: CPT

## 2022-02-23 PROCEDURE — 96375 TX/PRO/DX INJ NEW DRUG ADDON: CPT

## 2022-02-23 NOTE — PROGRESS NOTES
Pt here for C1D2. Arrives Ambulating with cane, accompanied by Family member           Pregnancy screening: Not applicable    Modifications in dose or schedule: No     Frequency of blood return and site check throughout administration: Prior to administration and At completion of therapy   Discharged to Home, Ambulating with cane, accompanied by:Family member    Outpatient Oncology Care Plan  Problem list:  knowledge deficit  Problems related to:    chemotherapy  Interventions:  chemotherapy teaching  Expected outcomes:  understands plan of care  Progress towards outcome:  making progress    Education Record    Learner:  Patient and Family Member  Barriers / Limitations:  None  Method:  Discussion and Reinforcement  Outcome:  Shows understanding  Comments: Pt tolerated infusion. RN noted flushing of cheeks after completion of infusion. Pt had no other symptoms. VS taken. APN notified. Per APN, monitor for an additional 15 minutes. Pt was observed for this time. Cheeks were less flushed, pt had no other complaints. Pt and family instructed to go to the ER if symptoms worsened.

## 2022-02-24 ENCOUNTER — OFFICE VISIT (OUTPATIENT)
Dept: HEMATOLOGY/ONCOLOGY | Age: 87
End: 2022-02-24
Attending: THORACIC SURGERY (CARDIOTHORACIC VASCULAR SURGERY)
Payer: MEDICARE

## 2022-02-24 VITALS
DIASTOLIC BLOOD PRESSURE: 74 MMHG | RESPIRATION RATE: 18 BRPM | HEART RATE: 65 BPM | SYSTOLIC BLOOD PRESSURE: 137 MMHG | TEMPERATURE: 99 F | OXYGEN SATURATION: 100 %

## 2022-02-24 DIAGNOSIS — C34.90 SMALL CELL LUNG CANCER (HCC): Primary | ICD-10-CM

## 2022-02-24 PROCEDURE — 96372 THER/PROPH/DIAG INJ SC/IM: CPT

## 2022-02-24 PROCEDURE — 96375 TX/PRO/DX INJ NEW DRUG ADDON: CPT

## 2022-02-24 PROCEDURE — 96413 CHEMO IV INFUSION 1 HR: CPT

## 2022-02-24 NOTE — PROGRESS NOTES
Pt here for C1D3. Arrives Ambulating with cane, accompanied by Family member           Pregnancy screening: Not applicable    Modifications in dose or schedule: No     Frequency of blood return and site check throughout administration: Prior to administration and At completion of therapy   Discharged to Home, Ambulating with cane, accompanied by:Family member    Outpatient Oncology Care Plan  Problem list:  knowledge deficit  Problems related to:    chemotherapy  Interventions:  provided general teaching  Expected outcomes:  understands plan of care  Progress towards outcome:  making progress    Education Record    Learner:  Patient and Family Member  Barriers / Limitations:  None  Method:  Reinforcement  Outcome:  Shows understanding  Comments: Pt tolerated infusion. Pt cheeks noted to be red after steroid infusion, similar to yesterday 2/23. Pt otherwise had no complaints.

## 2022-02-28 ENCOUNTER — SOCIAL WORK SERVICES (OUTPATIENT)
Dept: HEMATOLOGY/ONCOLOGY | Facility: HOSPITAL | Age: 87
End: 2022-02-28

## 2022-02-28 NOTE — PROGRESS NOTES
spoke with Daughter Fior Mayo and completed FMLA, facing to [facility] [employer] at M#[phone] and emailing secure to [email or web address]

## 2022-03-01 ENCOUNTER — OFFICE VISIT (OUTPATIENT)
Dept: HEMATOLOGY/ONCOLOGY | Age: 87
End: 2022-03-01
Attending: THORACIC SURGERY (CARDIOTHORACIC VASCULAR SURGERY)
Payer: MEDICARE

## 2022-03-01 VITALS
OXYGEN SATURATION: 99 % | TEMPERATURE: 100 F | WEIGHT: 185.31 LBS | HEART RATE: 69 BPM | DIASTOLIC BLOOD PRESSURE: 63 MMHG | BODY MASS INDEX: 31.64 KG/M2 | HEIGHT: 64.02 IN | RESPIRATION RATE: 18 BRPM | SYSTOLIC BLOOD PRESSURE: 100 MMHG

## 2022-03-01 DIAGNOSIS — M10.142 LEAD-INDUCED ACUTE GOUT OF LEFT HAND, INITIAL ENCOUNTER: ICD-10-CM

## 2022-03-01 DIAGNOSIS — I82.451 ACUTE DEEP VEIN THROMBOSIS (DVT) OF RIGHT PERONEAL VEIN (HCC): ICD-10-CM

## 2022-03-01 DIAGNOSIS — R31.0 GROSS HEMATURIA: ICD-10-CM

## 2022-03-01 DIAGNOSIS — D64.9 NORMOCYTIC NORMOCHROMIC ANEMIA: ICD-10-CM

## 2022-03-01 DIAGNOSIS — J01.00 ACUTE MAXILLARY SINUSITIS, RECURRENCE NOT SPECIFIED: ICD-10-CM

## 2022-03-01 DIAGNOSIS — C34.90 SMALL CELL LUNG CANCER (HCC): ICD-10-CM

## 2022-03-01 DIAGNOSIS — D50.8 IRON DEFICIENCY ANEMIA REFRACTORY TO IRON THERAPY: ICD-10-CM

## 2022-03-01 DIAGNOSIS — I26.92 ACUTE SADDLE PULMONARY EMBOLISM WITHOUT ACUTE COR PULMONALE (HCC): ICD-10-CM

## 2022-03-01 DIAGNOSIS — T56.0X1A LEAD-INDUCED ACUTE GOUT OF LEFT HAND, INITIAL ENCOUNTER: ICD-10-CM

## 2022-03-01 DIAGNOSIS — Z51.11 ENCOUNTER FOR CHEMOTHERAPY MANAGEMENT: Primary | ICD-10-CM

## 2022-03-01 DIAGNOSIS — C34.90 SMALL CELL LUNG CANCER (HCC): Primary | ICD-10-CM

## 2022-03-01 LAB
ALBUMIN/GLOB SERPL: 1.1 {RATIO} (ref 1–2)
ALP LIVER SERPL-CCNC: 67 U/L
ALT SERPL-CCNC: 14 U/L
ANION GAP SERPL CALC-SCNC: 8 MMOL/L (ref 0–18)
AST SERPL-CCNC: 13 U/L (ref 15–37)
BASOPHILS # BLD AUTO: 0.01 X10(3) UL (ref 0–0.2)
BASOPHILS NFR BLD AUTO: 0.6 %
BILIRUB SERPL-MCNC: 1.5 MG/DL (ref 0.1–2)
BILIRUB UR QL STRIP.AUTO: NEGATIVE
BUN BLD-MCNC: 41 MG/DL (ref 7–18)
CALCIUM BLD-MCNC: 9 MG/DL (ref 8.5–10.1)
CHLORIDE SERPL-SCNC: 103 MMOL/L (ref 98–112)
CLARITY UR REFRACT.AUTO: CLEAR
CO2 SERPL-SCNC: 26 MMOL/L (ref 21–32)
COLOR UR AUTO: YELLOW
CREAT BLD-MCNC: 1.52 MG/DL
EOSINOPHIL # BLD AUTO: 0.12 X10(3) UL (ref 0–0.7)
EOSINOPHIL NFR BLD AUTO: 6.6 %
ERYTHROCYTE [DISTWIDTH] IN BLOOD BY AUTOMATED COUNT: 13.1 %
FASTING STATUS PATIENT QL REPORTED: NO
GLOBULIN PLAS-MCNC: 3.3 G/DL (ref 2.8–4.4)
GLUCOSE BLD-MCNC: 104 MG/DL (ref 70–99)
GLUCOSE UR STRIP.AUTO-MCNC: NEGATIVE MG/DL
HCT VFR BLD AUTO: 33 %
HGB BLD-MCNC: 11.1 G/DL
HYALINE CASTS #/AREA URNS AUTO: PRESENT /LPF
IMM GRANULOCYTES # BLD AUTO: 0 X10(3) UL (ref 0–1)
IMM GRANULOCYTES NFR BLD: 0 %
KETONES UR STRIP.AUTO-MCNC: NEGATIVE MG/DL
LYMPHOCYTES # BLD AUTO: 1.38 X10(3) UL (ref 1–4)
LYMPHOCYTES NFR BLD AUTO: 76.2 %
MCH RBC QN AUTO: 32.4 PG (ref 26–34)
MCHC RBC AUTO-ENTMCNC: 33.6 G/DL (ref 31–37)
MCV RBC AUTO: 96.2 FL
MONOCYTES # BLD AUTO: 0.1 X10(3) UL (ref 0.1–1)
MONOCYTES NFR BLD AUTO: 5.5 %
NEUTROPHILS # BLD AUTO: 0.2 X10 (3) UL (ref 1.5–7.7)
NEUTROPHILS # BLD AUTO: 0.2 X10(3) UL (ref 1.5–7.7)
NEUTROPHILS NFR BLD AUTO: 11.1 %
NITRITE UR QL STRIP.AUTO: NEGATIVE
OSMOLALITY SERPL CALC.SUM OF ELEC: 294 MOSM/KG (ref 275–295)
PH UR STRIP.AUTO: 5 [PH] (ref 5–8)
PHOSPHATE SERPL-MCNC: 3.3 MG/DL (ref 2.5–4.9)
PLATELET # BLD AUTO: 113 10(3)UL (ref 150–450)
POTASSIUM SERPL-SCNC: 3.9 MMOL/L (ref 3.5–5.1)
PROT SERPL-MCNC: 6.8 G/DL (ref 6.4–8.2)
PROT UR STRIP.AUTO-MCNC: NEGATIVE MG/DL
RBC # BLD AUTO: 3.43 X10(6)UL
RBC UR QL AUTO: NEGATIVE
SODIUM SERPL-SCNC: 137 MMOL/L (ref 136–145)
SP GR UR STRIP.AUTO: 1.02 (ref 1–1.03)
URATE SERPL-MCNC: 8.9 MG/DL
UROBILINOGEN UR STRIP.AUTO-MCNC: 0.2 MG/DL
WBC # BLD AUTO: 1.8 X10(3) UL (ref 4–11)

## 2022-03-01 PROCEDURE — 96374 THER/PROPH/DIAG INJ IV PUSH: CPT

## 2022-03-01 PROCEDURE — 96361 HYDRATE IV INFUSION ADD-ON: CPT

## 2022-03-01 PROCEDURE — 99215 OFFICE O/P EST HI 40 MIN: CPT | Performed by: CLINICAL NURSE SPECIALIST

## 2022-03-01 RX ORDER — DOXYCYCLINE HYCLATE 100 MG/1
100 CAPSULE ORAL 2 TIMES DAILY
Qty: 14 CAPSULE | Refills: 0 | Status: SHIPPED | OUTPATIENT
Start: 2022-03-01 | End: 2022-03-15

## 2022-03-01 RX ORDER — METHYLPREDNISOLONE 4 MG/1
TABLET ORAL
Qty: 1 EACH | Refills: 0 | Status: SHIPPED | OUTPATIENT
Start: 2022-03-01 | End: 2022-03-15

## 2022-03-01 NOTE — PROGRESS NOTES
Education Record    Learner:  Patient and Family Member    Disease / Diagnosis: neutropenia, dehydration, treatment for UTI    Barriers / Limitations:  None   Comments:    Method:  Discussion, Printed material and Reinforcement   Comments:    General Topics:  Medication, Precautions, Side effects and symptom management, Plan of care reviewed and Fall risk and prevention   Comments:    Outcome:  Shows understanding   Comments:    Reviewed lab results with pt and family member. Pt given digital thermometer to take home. Aware to call with any temp 100.4 or greater, worsening finger pain. Reviewed RX sent to pharmacy. Start abx for UTI today, start medrol dose pack tomorrow. Both verbalized understanding.

## 2022-03-01 NOTE — PROGRESS NOTES
Pt here for APN visit. Request of provider for IV dex and IVF slow rate until labs resulted. Urine sample sent as ordered. Pt resting comfortably with family member by chairside.

## 2022-03-04 ENCOUNTER — DOCUMENTATION ONLY (OUTPATIENT)
Dept: HEMATOLOGY/ONCOLOGY | Facility: HOSPITAL | Age: 87
End: 2022-03-04

## 2022-03-04 NOTE — PROGRESS NOTES
Nutrition screen complete as triggered by [employer] dx of SCLC. Chart reviewed. Pt appears nutritionally stable at present. RD available on consult.

## 2022-03-07 RX ORDER — SIMVASTATIN 20 MG
TABLET ORAL
Qty: 90 TABLET | Refills: 0 | Status: SHIPPED | OUTPATIENT
Start: 2022-03-07

## 2022-03-07 RX ORDER — FOLIC ACID 1 MG/1
TABLET ORAL
Qty: 90 TABLET | Refills: 0 | Status: SHIPPED | OUTPATIENT
Start: 2022-03-07

## 2022-03-12 RX ORDER — METOPROLOL SUCCINATE 50 MG/1
TABLET, EXTENDED RELEASE ORAL
Qty: 90 TABLET | Refills: 1 | Status: SHIPPED | OUTPATIENT
Start: 2022-03-12

## 2022-03-15 ENCOUNTER — OFFICE VISIT (OUTPATIENT)
Dept: HEMATOLOGY/ONCOLOGY | Age: 87
End: 2022-03-15
Attending: THORACIC SURGERY (CARDIOTHORACIC VASCULAR SURGERY)
Payer: MEDICARE

## 2022-03-15 ENCOUNTER — TELEPHONE (OUTPATIENT)
Dept: FAMILY MEDICINE CLINIC | Facility: CLINIC | Age: 87
End: 2022-03-15

## 2022-03-15 VITALS
BODY MASS INDEX: 32.21 KG/M2 | SYSTOLIC BLOOD PRESSURE: 107 MMHG | HEART RATE: 108 BPM | TEMPERATURE: 99 F | WEIGHT: 188.69 LBS | DIASTOLIC BLOOD PRESSURE: 63 MMHG | OXYGEN SATURATION: 96 % | RESPIRATION RATE: 18 BRPM | HEIGHT: 64.02 IN

## 2022-03-15 DIAGNOSIS — C34.90 SMALL CELL LUNG CANCER (HCC): ICD-10-CM

## 2022-03-15 DIAGNOSIS — I26.92 ACUTE SADDLE PULMONARY EMBOLISM WITHOUT ACUTE COR PULMONALE (HCC): ICD-10-CM

## 2022-03-15 DIAGNOSIS — D64.9 NORMOCYTIC NORMOCHROMIC ANEMIA: ICD-10-CM

## 2022-03-15 DIAGNOSIS — C67.8 MALIGNANT NEOPLASM OF OVERLAPPING SITES OF BLADDER (HCC): ICD-10-CM

## 2022-03-15 DIAGNOSIS — R79.89 ELEVATED SERUM CREATININE: ICD-10-CM

## 2022-03-15 DIAGNOSIS — C34.90 SMALL CELL LUNG CANCER (HCC): Primary | ICD-10-CM

## 2022-03-15 DIAGNOSIS — L03.012 CELLULITIS OF FINGER OF LEFT HAND: Primary | ICD-10-CM

## 2022-03-15 DIAGNOSIS — I82.90 VTE (VENOUS THROMBOEMBOLISM): ICD-10-CM

## 2022-03-15 DIAGNOSIS — I82.451 ACUTE DEEP VEIN THROMBOSIS (DVT) OF RIGHT PERONEAL VEIN (HCC): ICD-10-CM

## 2022-03-15 DIAGNOSIS — C34.92 SQUAMOUS CELL CARCINOMA OF LEFT LUNG (HCC): ICD-10-CM

## 2022-03-15 LAB
ALBUMIN SERPL-MCNC: 3 G/DL (ref 3.4–5)
ALBUMIN/GLOB SERPL: 0.9 {RATIO} (ref 1–2)
ALP LIVER SERPL-CCNC: 55 U/L
ALT SERPL-CCNC: 14 U/L
ANION GAP SERPL CALC-SCNC: 9 MMOL/L (ref 0–18)
AST SERPL-CCNC: 12 U/L (ref 15–37)
BASOPHILS # BLD: 0 X10(3) UL (ref 0–0.2)
BASOPHILS NFR BLD: 0 %
BILIRUB SERPL-MCNC: 0.4 MG/DL (ref 0.1–2)
BUN BLD-MCNC: 27 MG/DL (ref 7–18)
CALCIUM BLD-MCNC: 8.6 MG/DL (ref 8.5–10.1)
CHLORIDE SERPL-SCNC: 106 MMOL/L (ref 98–112)
CO2 SERPL-SCNC: 25 MMOL/L (ref 21–32)
CREAT BLD-MCNC: 2.29 MG/DL
DEPRECATED HBV CORE AB SER IA-ACNC: 116.5 NG/ML
EOSINOPHIL # BLD: 0 X10(3) UL (ref 0–0.7)
EOSINOPHIL NFR BLD: 0 %
ERYTHROCYTE [DISTWIDTH] IN BLOOD BY AUTOMATED COUNT: 14.7 %
FASTING STATUS PATIENT QL REPORTED: NO
GLOBULIN PLAS-MCNC: 3.5 G/DL (ref 2.8–4.4)
GLUCOSE BLD-MCNC: 115 MG/DL (ref 70–99)
HCT VFR BLD AUTO: 29.7 %
HGB BLD-MCNC: 9.6 G/DL
LDH SERPL L TO P-CCNC: 187 U/L
LYMPHOCYTES NFR BLD: 2.29 X10(3) UL (ref 1–4)
LYMPHOCYTES NFR BLD: 21 %
MCH RBC QN AUTO: 31.5 PG (ref 26–34)
MCHC RBC AUTO-ENTMCNC: 32.3 G/DL (ref 31–37)
MCV RBC AUTO: 97.4 FL
METAMYELOCYTES # BLD: 0.11 X10(3) UL
METAMYELOCYTES NFR BLD: 1 %
MONOCYTES # BLD: 0.87 X10(3) UL (ref 0.1–1)
MONOCYTES NFR BLD: 8 %
MORPHOLOGY: NORMAL
NEUTROPHILS # BLD AUTO: 6.28 X10 (3) UL (ref 1.5–7.7)
NEUTROPHILS NFR BLD: 66 %
NEUTS BAND NFR BLD: 4 %
NEUTS HYPERSEG # BLD: 7.63 X10(3) UL (ref 1.5–7.7)
OSMOLALITY SERPL CALC.SUM OF ELEC: 296 MOSM/KG (ref 275–295)
PLATELET # BLD AUTO: 334 10(3)UL (ref 150–450)
POTASSIUM SERPL-SCNC: 3.4 MMOL/L (ref 3.5–5.1)
PROT SERPL-MCNC: 6.5 G/DL (ref 6.4–8.2)
RBC # BLD AUTO: 3.05 X10(6)UL
SODIUM SERPL-SCNC: 140 MMOL/L (ref 136–145)
TOTAL CELLS COUNTED BLD: 100
WBC # BLD AUTO: 10.9 X10(3) UL (ref 4–11)

## 2022-03-15 PROCEDURE — 99215 OFFICE O/P EST HI 40 MIN: CPT | Performed by: INTERNAL MEDICINE

## 2022-03-15 RX ORDER — SULFAMETHOXAZOLE AND TRIMETHOPRIM 800; 160 MG/1; MG/1
1 TABLET ORAL 2 TIMES DAILY
COMMUNITY
Start: 2022-03-13 | End: 2022-03-23

## 2022-03-15 RX ORDER — OXYCODONE HYDROCHLORIDE AND ACETAMINOPHEN 5; 325 MG/1; MG/1
1 TABLET ORAL EVERY 8 HOURS PRN
COMMUNITY
Start: 2022-03-13 | End: 2022-03-22

## 2022-03-15 NOTE — PROGRESS NOTES
Pt here for 3 week MD f/u and due for chemo. Energy level is low, appetite is good. Pt was in ER over weekend for left pointer finger, swelling and redness has increased, dx with cellulitis, on Bactrim and oxycodone for pain. Pt has chronic arthritic pain in back and knees. UTI sx are better.      Outpatient Oncology Care Plan  Problem list:  fatigue  knowledge deficit    Problems related to:    disease/disease progression    Interventions:  provided general teaching    Expected outcomes:  safe in environment  symptoms relieved/minimized  understands plan of care    Progress towards outcome:  making progress    Education Record    Learner:  Patient and family member  Barriers / Limitations:  None  Method:  Reinforcement  Outcome:  Shows understanding  Comments:

## 2022-03-16 ENCOUNTER — APPOINTMENT (OUTPATIENT)
Dept: HEMATOLOGY/ONCOLOGY | Age: 87
End: 2022-03-16
Attending: THORACIC SURGERY (CARDIOTHORACIC VASCULAR SURGERY)
Payer: MEDICARE

## 2022-03-17 ENCOUNTER — APPOINTMENT (OUTPATIENT)
Dept: HEMATOLOGY/ONCOLOGY | Age: 87
End: 2022-03-17
Attending: THORACIC SURGERY (CARDIOTHORACIC VASCULAR SURGERY)
Payer: MEDICARE

## 2022-03-19 ENCOUNTER — MED REC SCAN ONLY (OUTPATIENT)
Dept: FAMILY MEDICINE CLINIC | Facility: CLINIC | Age: 87
End: 2022-03-19

## 2022-03-22 ENCOUNTER — OFFICE VISIT (OUTPATIENT)
Dept: HEMATOLOGY/ONCOLOGY | Age: 87
End: 2022-03-22
Attending: THORACIC SURGERY (CARDIOTHORACIC VASCULAR SURGERY)
Payer: MEDICARE

## 2022-03-22 VITALS
RESPIRATION RATE: 18 BRPM | DIASTOLIC BLOOD PRESSURE: 78 MMHG | HEIGHT: 64.02 IN | TEMPERATURE: 98 F | HEART RATE: 60 BPM | BODY MASS INDEX: 32.73 KG/M2 | OXYGEN SATURATION: 99 % | WEIGHT: 191.69 LBS | SYSTOLIC BLOOD PRESSURE: 135 MMHG

## 2022-03-22 DIAGNOSIS — D70.1 CHEMOTHERAPY INDUCED NEUTROPENIA (HCC): ICD-10-CM

## 2022-03-22 DIAGNOSIS — M1A.0420 CHRONIC GOUT OF LEFT HAND, UNSPECIFIED CAUSE: ICD-10-CM

## 2022-03-22 DIAGNOSIS — C34.90 SMALL CELL LUNG CANCER (HCC): Primary | ICD-10-CM

## 2022-03-22 DIAGNOSIS — Z51.11 ENCOUNTER FOR CHEMOTHERAPY MANAGEMENT: ICD-10-CM

## 2022-03-22 DIAGNOSIS — I82.90 VTE (VENOUS THROMBOEMBOLISM): ICD-10-CM

## 2022-03-22 DIAGNOSIS — C34.12 SMALL CELL CARCINOMA OF UPPER LOBE OF LEFT LUNG (HCC): ICD-10-CM

## 2022-03-22 DIAGNOSIS — D64.9 NORMOCYTIC NORMOCHROMIC ANEMIA: ICD-10-CM

## 2022-03-22 DIAGNOSIS — T45.1X5A CHEMOTHERAPY INDUCED NEUTROPENIA (HCC): ICD-10-CM

## 2022-03-22 PROBLEM — M51.26 DISPLACEMENT OF LUMBAR INTERVERTEBRAL DISC: Status: ACTIVE | Noted: 2022-03-22

## 2022-03-22 PROBLEM — M47.22 CERVICAL SPONDYLOSIS WITH RADICULOPATHY: Status: ACTIVE | Noted: 2018-01-08

## 2022-03-22 PROBLEM — M51.369 DEGENERATION OF LUMBAR INTERVERTEBRAL DISC: Status: ACTIVE | Noted: 2022-03-22

## 2022-03-22 PROBLEM — M48.061 LUMBAR SPINAL STENOSIS: Status: ACTIVE | Noted: 2022-03-22

## 2022-03-22 PROBLEM — M51.36 DEGENERATION OF LUMBAR INTERVERTEBRAL DISC: Status: ACTIVE | Noted: 2022-03-22

## 2022-03-22 LAB
ALBUMIN SERPL-MCNC: 3.2 G/DL (ref 3.4–5)
ALBUMIN/GLOB SERPL: 1 {RATIO} (ref 1–2)
ALP LIVER SERPL-CCNC: 45 U/L
ALT SERPL-CCNC: 15 U/L
ANION GAP SERPL CALC-SCNC: 9 MMOL/L (ref 0–18)
AST SERPL-CCNC: 10 U/L (ref 15–37)
BASOPHILS # BLD AUTO: 0.17 X10(3) UL (ref 0–0.2)
BASOPHILS NFR BLD AUTO: 2 %
BILIRUB SERPL-MCNC: 0.2 MG/DL (ref 0.1–2)
BUN BLD-MCNC: 44 MG/DL (ref 7–18)
CALCIUM BLD-MCNC: 9 MG/DL (ref 8.5–10.1)
CHLORIDE SERPL-SCNC: 109 MMOL/L (ref 98–112)
CO2 SERPL-SCNC: 22 MMOL/L (ref 21–32)
CREAT BLD-MCNC: 1.9 MG/DL
DEPRECATED HBV CORE AB SER IA-ACNC: 67.6 NG/ML
EOSINOPHIL # BLD AUTO: 0.1 X10(3) UL (ref 0–0.7)
EOSINOPHIL NFR BLD AUTO: 1.2 %
ERYTHROCYTE [DISTWIDTH] IN BLOOD BY AUTOMATED COUNT: 15.3 %
FASTING STATUS PATIENT QL REPORTED: NO
GLOBULIN PLAS-MCNC: 3.2 G/DL (ref 2.8–4.4)
GLUCOSE BLD-MCNC: 129 MG/DL (ref 70–99)
HCT VFR BLD AUTO: 30.2 %
HGB BLD-MCNC: 9.6 G/DL
IMM GRANULOCYTES # BLD AUTO: 0.12 X10(3) UL (ref 0–1)
IMM GRANULOCYTES NFR BLD: 1.4 %
LDH SERPL L TO P-CCNC: 167 U/L
LYMPHOCYTES # BLD AUTO: 2 X10(3) UL (ref 1–4)
LYMPHOCYTES NFR BLD AUTO: 23.5 %
MCH RBC QN AUTO: 31.2 PG (ref 26–34)
MCV RBC AUTO: 98.1 FL
MONOCYTES # BLD AUTO: 1.08 X10(3) UL (ref 0.1–1)
MONOCYTES NFR BLD AUTO: 12.7 %
NEUTROPHILS # BLD AUTO: 5.05 X10 (3) UL (ref 1.5–7.7)
NEUTROPHILS # BLD AUTO: 5.05 X10(3) UL (ref 1.5–7.7)
NEUTROPHILS NFR BLD AUTO: 59.2 %
OSMOLALITY SERPL CALC.SUM OF ELEC: 303 MOSM/KG (ref 275–295)
PLATELET # BLD AUTO: 498 10(3)UL (ref 150–450)
POTASSIUM SERPL-SCNC: 4.1 MMOL/L (ref 3.5–5.1)
PROT SERPL-MCNC: 6.4 G/DL (ref 6.4–8.2)
RBC # BLD AUTO: 3.08 X10(6)UL
SODIUM SERPL-SCNC: 140 MMOL/L (ref 136–145)
WBC # BLD AUTO: 8.5 X10(3) UL (ref 4–11)

## 2022-03-22 PROCEDURE — 96413 CHEMO IV INFUSION 1 HR: CPT

## 2022-03-22 PROCEDURE — 99215 OFFICE O/P EST HI 40 MIN: CPT | Performed by: NURSE PRACTITIONER

## 2022-03-22 PROCEDURE — 96375 TX/PRO/DX INJ NEW DRUG ADDON: CPT

## 2022-03-22 PROCEDURE — 96417 CHEMO IV INFUS EACH ADDL SEQ: CPT

## 2022-03-22 RX ORDER — PREDNISONE 20 MG/1
TABLET ORAL
Qty: 20 TABLET | Refills: 0 | Status: SHIPPED | OUTPATIENT
Start: 2022-03-22

## 2022-03-22 NOTE — PROGRESS NOTES
Pt here for APN f/u and due for possible chemo. Pt's finger looks less red and inflamed, swelling is improved, pt still having a hard time doing anything with it. Hand specialist tried to drain, noted for sure gout. Energy level is fair, appetite is good, no bowel issues or nausea.      Outpatient Oncology Care Plan  Problem list:  fatigue  knowledge deficit    Problems related to:    disease/disease progression    Interventions:  provided general teaching    Expected outcomes:  safe in environment  symptoms relieved/minimized  understands plan of care    Progress towards outcome:  making progress    Education Record    Learner:  Patient and Family Member  Barriers / Limitations:  None  Method:  Reinforcement  Outcome:  Shows understanding  Comments:

## 2022-03-22 NOTE — PROGRESS NOTES
Pt here for C2D1 etoposide/carbo. Arrives Ambulating with cane, accompanied by Family member           Pregnancy screening: Denies possibility of pregnancy    Modifications in dose or schedule:  Yes. Reduced 50% for creatinine clearance     Frequency of blood return and site check throughout administration: Prior to administration, Prior to each drug and At completion of therapy   Discharged to Home, Ambulating with cane, accompanied by:Family member    Outpatient Oncology Care Plan  Problem list:  knowledge deficit  Problems related to:    chemotherapy  Interventions:  maintain safe environment  nausea/vomiting teaching  encourage activity as tolerated  monitor effect of nausea/vomiting management  monitor lab values  promoted rest  Expected outcomes:  adequate sleep/rest  optimal lab values  safe in environment  understands plan of care  Progress towards outcome:  making progress    Education Record    Learner:  Patient and Family Member  Barriers / Limitations:  None  Method:  Discussion and Printed material  Outcome:  Shows understanding  Comments:  Chemo infused per MD order without incident. Tolerated well. Pt due for treatment next 2 days. PIV kept in and secured. Printed AVS given and reviewed. Reinforced antiemetic home medications. Discharged home in stable condition, no new complaints.

## 2022-03-23 ENCOUNTER — OFFICE VISIT (OUTPATIENT)
Dept: HEMATOLOGY/ONCOLOGY | Age: 87
End: 2022-03-23
Attending: THORACIC SURGERY (CARDIOTHORACIC VASCULAR SURGERY)
Payer: MEDICARE

## 2022-03-23 VITALS
HEART RATE: 73 BPM | RESPIRATION RATE: 22 BRPM | BODY MASS INDEX: 33.2 KG/M2 | OXYGEN SATURATION: 99 % | SYSTOLIC BLOOD PRESSURE: 135 MMHG | HEIGHT: 64.02 IN | TEMPERATURE: 98 F | WEIGHT: 194.5 LBS | DIASTOLIC BLOOD PRESSURE: 67 MMHG

## 2022-03-23 DIAGNOSIS — C34.12 SMALL CELL CARCINOMA OF UPPER LOBE OF LEFT LUNG (HCC): ICD-10-CM

## 2022-03-23 DIAGNOSIS — T50.905A WEIGHT GAIN DUE TO MEDICATION: Primary | ICD-10-CM

## 2022-03-23 DIAGNOSIS — R63.5 WEIGHT GAIN DUE TO MEDICATION: ICD-10-CM

## 2022-03-23 DIAGNOSIS — T50.905A WEIGHT GAIN DUE TO MEDICATION: ICD-10-CM

## 2022-03-23 DIAGNOSIS — R06.00 DOE (DYSPNEA ON EXERTION): ICD-10-CM

## 2022-03-23 DIAGNOSIS — R63.5 WEIGHT GAIN DUE TO MEDICATION: Primary | ICD-10-CM

## 2022-03-23 DIAGNOSIS — M1A.0420 CHRONIC GOUT OF LEFT HAND, UNSPECIFIED CAUSE: ICD-10-CM

## 2022-03-23 DIAGNOSIS — C34.12 SMALL CELL CARCINOMA OF UPPER LOBE OF LEFT LUNG (HCC): Primary | ICD-10-CM

## 2022-03-23 PROCEDURE — 96409 CHEMO IV PUSH SNGL DRUG: CPT

## 2022-03-23 PROCEDURE — 96375 TX/PRO/DX INJ NEW DRUG ADDON: CPT

## 2022-03-23 PROCEDURE — 99214 OFFICE O/P EST MOD 30 MIN: CPT | Performed by: NURSE PRACTITIONER

## 2022-03-23 RX ORDER — FUROSEMIDE 10 MG/ML
20 INJECTION INTRAMUSCULAR; INTRAVENOUS ONCE
Status: COMPLETED | OUTPATIENT
Start: 2022-03-23 | End: 2022-03-23

## 2022-03-23 RX ADMIN — FUROSEMIDE 20 MG: 10 INJECTION INTRAMUSCULAR; INTRAVENOUS at 12:47:00

## 2022-03-23 NOTE — PROGRESS NOTES
Pt here for C2D2 Etoposide . Arrives Ambulating independently, accompanied by Self           Pregnancy screening: Not applicable    Modifications in dose or schedule: Yes, 3/22 dose changed per Dr. Eddie Helms      Frequency of blood return and site check throughout administration: Prior to administration   Discharged to Home, Ambulating independently, accompanied by:Self    Outpatient Oncology Care Plan  Problem list:  knowledge deficit  weight gain and shortness of breath   Problems related to:    chemotherapy  Interventions:  provided general teaching with Obed Caballero NP  Expected outcomes:  understands plan of care  verbalize how to care for self  Progress towards outcome:  making progress    Education Record    Learner:  Patient  Barriers / Limitations:  None  Method:  Brief focused and Discussion  Outcome:  Shows understanding  Comments:  Orders received to administer Lasix 20mg IVP after Etoposide is completed. Okay to infuse Etoposide today. Pt discharged post Lasix IVP, instructed to call if needed. IV left in place, has some blood around the site, clean, no redness and brisk blood return noted.

## 2022-03-24 ENCOUNTER — OFFICE VISIT (OUTPATIENT)
Dept: HEMATOLOGY/ONCOLOGY | Age: 87
End: 2022-03-24
Attending: THORACIC SURGERY (CARDIOTHORACIC VASCULAR SURGERY)
Payer: MEDICARE

## 2022-03-24 VITALS
BODY MASS INDEX: 33.38 KG/M2 | HEART RATE: 66 BPM | TEMPERATURE: 99 F | DIASTOLIC BLOOD PRESSURE: 69 MMHG | SYSTOLIC BLOOD PRESSURE: 125 MMHG | HEIGHT: 64.02 IN | RESPIRATION RATE: 18 BRPM | OXYGEN SATURATION: 97 % | WEIGHT: 195.5 LBS

## 2022-03-24 DIAGNOSIS — C34.12 SMALL CELL CARCINOMA OF UPPER LOBE OF LEFT LUNG (HCC): Primary | ICD-10-CM

## 2022-03-24 PROCEDURE — 96372 THER/PROPH/DIAG INJ SC/IM: CPT

## 2022-03-24 PROCEDURE — 96413 CHEMO IV INFUSION 1 HR: CPT

## 2022-03-24 PROCEDURE — 96375 TX/PRO/DX INJ NEW DRUG ADDON: CPT

## 2022-03-24 RX ORDER — FUROSEMIDE 10 MG/ML
20 INJECTION INTRAMUSCULAR; INTRAVENOUS ONCE
Status: COMPLETED | OUTPATIENT
Start: 2022-03-24 | End: 2022-03-24

## 2022-03-24 RX ADMIN — FUROSEMIDE 20 MG: 10 INJECTION INTRAMUSCULAR; INTRAVENOUS at 13:09:00

## 2022-03-24 NOTE — PROGRESS NOTES
Pt here for C2D3. Arrives Ambulating with cane, accompanied by Self           Pregnancy screening: Denies possibility of pregnancy    Modifications in dose or schedule: No     Frequency of blood return and site check throughout administration: Prior to administration and Prior to each drug   Discharged to Home, Ambulating with cane, accompanied by:Self    Outpatient Oncology Care Plan  Problem list:  knowledge deficit  Problems related to:    chemotherapy  Interventions:  chemotherapy teaching  Expected outcomes:  understands plan of care  Progress towards outcome:  making progress    Education Record    Learner:  Patient  Barriers / Limitations:  None  Method:  Brief focused  Outcome:  Shows understanding  Comments:  Patient  C/o lower extremity swelling and weight gain, IV lasix ordered per Marlene CALHOUN. Patient toelrated infusion. Discharged home in stable condition.

## 2022-03-29 ENCOUNTER — OFFICE VISIT (OUTPATIENT)
Dept: FAMILY MEDICINE CLINIC | Facility: CLINIC | Age: 87
End: 2022-03-29
Payer: COMMERCIAL

## 2022-03-29 ENCOUNTER — HOSPITAL ENCOUNTER (EMERGENCY)
Age: 87
Discharge: HOME | End: 2022-03-29
Attending: EMERGENCY MEDICINE
Payer: MEDICARE

## 2022-03-29 VITALS
HEART RATE: 60 BPM | WEIGHT: 181 LBS | DIASTOLIC BLOOD PRESSURE: 44 MMHG | TEMPERATURE: 98 F | SYSTOLIC BLOOD PRESSURE: 108 MMHG | RESPIRATION RATE: 16 BRPM | HEIGHT: 64 IN | BODY MASS INDEX: 30.9 KG/M2 | OXYGEN SATURATION: 100 %

## 2022-03-29 DIAGNOSIS — M10.00 IDIOPATHIC GOUT, UNSPECIFIED CHRONICITY, UNSPECIFIED SITE: Primary | ICD-10-CM

## 2022-03-29 DIAGNOSIS — M79.642 LEFT HAND PAIN: Primary | ICD-10-CM

## 2022-03-29 PROCEDURE — 99283 EMERGENCY DEPT VISIT LOW MDM: CPT

## 2022-03-29 RX ORDER — PREDNISONE 20 MG/1
60 TABLET ORAL ONCE
Status: DISCONTINUED | OUTPATIENT
Start: 2022-03-29 | End: 2022-03-29

## 2022-03-29 RX ORDER — HYDROCODONE BITARTRATE AND ACETAMINOPHEN 5; 325 MG/1; MG/1
1 TABLET ORAL EVERY 6 HOURS PRN
Qty: 12 TABLET | Refills: 0 | Status: SHIPPED | OUTPATIENT
Start: 2022-03-29 | End: 2022-04-10

## 2022-03-29 RX ORDER — PREDNISONE 20 MG/1
40 TABLET ORAL ONCE
Status: COMPLETED | OUTPATIENT
Start: 2022-03-29 | End: 2022-03-29

## 2022-03-29 RX ORDER — HYDROCODONE BITARTRATE AND ACETAMINOPHEN 5; 325 MG/1; MG/1
1 TABLET ORAL ONCE
Status: COMPLETED | OUTPATIENT
Start: 2022-03-29 | End: 2022-03-29

## 2022-03-29 NOTE — PROGRESS NOTES
Patient presents to [facility] for evaluation of cellulitis vs gout in hand. Per daughter, patient has had multiple/contridicting diagnoses of cellulitis and gout from [facility] doctor\" and hand dr.     Is currently on Prednisone now. For this presentation in the past, she has had IV with steroids and is requesting this here today. Hand dr  Diagnosed patient with gout    \"Chemo \" diagnosed with gout and cellulitis. Per patient and daughter, this is the worst her symptoms have been. Offered evaluation here and notified patient and daughter, if physical exam was consistent with outpatient treatment, I could prescribe antibiotic as she is already on steroid. Patient would like IV treatment and imaging if indicated. Notified patient of PF ER location. She recieves chemo at outpatient center and will go to [facility] ER now. No charge.

## 2022-03-29 NOTE — ED INITIAL ASSESSMENT (HPI)
C/o left index finger swelling x 6 weeks. Currently on prednisone. No injury. Currently on chemo tx for lung ca.

## 2022-03-30 ENCOUNTER — TELEPHONE (OUTPATIENT)
Dept: HEMATOLOGY/ONCOLOGY | Facility: HOSPITAL | Age: 87
End: 2022-03-30

## 2022-03-31 ENCOUNTER — TELEPHONE (OUTPATIENT)
Dept: FAMILY MEDICINE CLINIC | Facility: CLINIC | Age: 87
End: 2022-03-31

## 2022-03-31 ENCOUNTER — HOSPITAL ENCOUNTER (EMERGENCY)
Facility: HOSPITAL | Age: 87
Discharge: HOME | End: 2022-03-31
Attending: EMERGENCY MEDICINE
Payer: MEDICARE

## 2022-03-31 VITALS
RESPIRATION RATE: 18 BRPM | HEIGHT: 64 IN | DIASTOLIC BLOOD PRESSURE: 52 MMHG | HEART RATE: 62 BPM | BODY MASS INDEX: 30.73 KG/M2 | SYSTOLIC BLOOD PRESSURE: 133 MMHG | OXYGEN SATURATION: 99 % | WEIGHT: 180 LBS | TEMPERATURE: 97 F

## 2022-03-31 DIAGNOSIS — M10.9 ACUTE GOUT, UNSPECIFIED CAUSE, UNSPECIFIED SITE: Primary | ICD-10-CM

## 2022-03-31 PROCEDURE — 99283 EMERGENCY DEPT VISIT LOW MDM: CPT

## 2022-03-31 RX ORDER — HYDROCODONE BITARTRATE AND ACETAMINOPHEN 5; 325 MG/1; MG/1
2 TABLET ORAL ONCE
Status: COMPLETED | OUTPATIENT
Start: 2022-03-31 | End: 2022-03-31

## 2022-03-31 RX ORDER — COLCHICINE 0.6 MG/1
0.6 TABLET ORAL DAILY
Qty: 7 TABLET | Refills: 0 | Status: SHIPPED | OUTPATIENT
Start: 2022-03-31 | End: 2022-04-07

## 2022-03-31 NOTE — TELEPHONE ENCOUNTER
Pt seen in ED 3/29 for gout to her finger. She is currently getting chemotherapy. She has had recurrent bouts of pain in the finger, has been on prednisone. She has had multiple visits with her rheumatologist who eventually referred her back to her oncologist. She had to stop taking colchicine because it elevated her Cr. Pt is scheduled to see you 4/11 but she's very worried about her finger and wants to know what else she can be doing. Also requesting another rheum referral as she is not happy with her current one. ER did recommend  to her. OK to place this referral? Any other recommendations? See ER note 3/29.

## 2022-03-31 NOTE — TELEPHONE ENCOUNTER
MD Iftikhar Pinon, RN 1 hour ago (12:10 PM)     LP    Ok to do referral and send [facility] text

## 2022-03-31 NOTE — TELEPHONE ENCOUNTER
Pt calling and is very worried about her gout, she was in the ER. PSR offered ER fu but there is nothing right away with . made appt for 4/11/22 Pt really needs a referral to rheumatology pt states 'I am scared to lose my finger\" can the referral be placed before pt is seen?  Pt is upset and would like to know what to do before her appt on 4/11/22  Sending high priority because pt wants a call back today     Please advise    Thank you

## 2022-03-31 NOTE — TELEPHONE ENCOUNTER
Pt was given a 15 days Prednisone Taper on 3/22. PT given [facility] in ED 3/29 Pt just arrived in ER again for gout of finger. Dr. Jeison Vanegas referral placed in [address] [place] [address] Rd.

## 2022-03-31 NOTE — TELEPHONE ENCOUNTER
Pt chking status of referral. Informed pt,  is not here today, we will await feedback from .     Pt requesting [phone] be called instead of home.

## 2022-04-01 NOTE — TELEPHONE ENCOUNTER
Pt seen in the ER yesterday and has POC. Closing encounter:        MDM   72-year-old woman with gout flare to second digit of the left hand, this was aspirated in the orthopedics clinic and did show monosodium urate crystals, symptoms were improving on a course of prednisone however have accelerated since she has started to taper down her steroids. Is taking Norco for pain but states not helping. Spoke with patient's rheumatologist Dr. Maria L Villa recommended adding on half dose of colchicine daily as patient tapers her prednisone, she will follow-up in rheumatology clinic as well. Patient is in agreement with the plan and understands return precautions as we have discussed.

## 2022-04-06 ENCOUNTER — TELEPHONE (OUTPATIENT)
Dept: HEMATOLOGY/ONCOLOGY | Age: 87
End: 2022-04-06

## 2022-04-12 ENCOUNTER — OFFICE VISIT (OUTPATIENT)
Dept: HEMATOLOGY/ONCOLOGY | Age: 87
End: 2022-04-12
Attending: THORACIC SURGERY (CARDIOTHORACIC VASCULAR SURGERY)
Payer: MEDICARE

## 2022-04-12 VITALS
DIASTOLIC BLOOD PRESSURE: 94 MMHG | RESPIRATION RATE: 18 BRPM | WEIGHT: 187.88 LBS | HEIGHT: 64.02 IN | TEMPERATURE: 99 F | OXYGEN SATURATION: 99 % | HEART RATE: 71 BPM | BODY MASS INDEX: 32.07 KG/M2 | SYSTOLIC BLOOD PRESSURE: 132 MMHG

## 2022-04-12 DIAGNOSIS — C67.8 MALIGNANT NEOPLASM OF OVERLAPPING SITES OF BLADDER (HCC): ICD-10-CM

## 2022-04-12 DIAGNOSIS — D70.1 CHEMOTHERAPY INDUCED NEUTROPENIA (HCC): ICD-10-CM

## 2022-04-12 DIAGNOSIS — I26.92 ACUTE SADDLE PULMONARY EMBOLISM WITHOUT ACUTE COR PULMONALE (HCC): ICD-10-CM

## 2022-04-12 DIAGNOSIS — I82.451 ACUTE DEEP VEIN THROMBOSIS (DVT) OF RIGHT PERONEAL VEIN (HCC): ICD-10-CM

## 2022-04-12 DIAGNOSIS — M1A.0420 CHRONIC GOUT OF LEFT HAND, UNSPECIFIED CAUSE: Primary | ICD-10-CM

## 2022-04-12 DIAGNOSIS — D64.9 NORMOCYTIC NORMOCHROMIC ANEMIA: ICD-10-CM

## 2022-04-12 DIAGNOSIS — T45.1X5A CHEMOTHERAPY INDUCED NEUTROPENIA (HCC): ICD-10-CM

## 2022-04-12 DIAGNOSIS — C34.92 SQUAMOUS CELL CARCINOMA OF LEFT LUNG (HCC): ICD-10-CM

## 2022-04-12 DIAGNOSIS — C34.12 SMALL CELL CARCINOMA OF UPPER LOBE OF LEFT LUNG (HCC): Primary | ICD-10-CM

## 2022-04-12 DIAGNOSIS — I82.90 VTE (VENOUS THROMBOEMBOLISM): ICD-10-CM

## 2022-04-12 DIAGNOSIS — C34.12 SMALL CELL CARCINOMA OF UPPER LOBE OF LEFT LUNG (HCC): ICD-10-CM

## 2022-04-12 DIAGNOSIS — R79.89 ELEVATED SERUM CREATININE: ICD-10-CM

## 2022-04-12 LAB
ALBUMIN SERPL-MCNC: 3.4 G/DL (ref 3.4–5)
ALBUMIN/GLOB SERPL: 1.1 {RATIO} (ref 1–2)
ALP LIVER SERPL-CCNC: 52 U/L
ALT SERPL-CCNC: 20 U/L
ANION GAP SERPL CALC-SCNC: 10 MMOL/L (ref 0–18)
AST SERPL-CCNC: 15 U/L (ref 15–37)
BASOPHILS # BLD AUTO: 0.03 X10(3) UL (ref 0–0.2)
BASOPHILS NFR BLD AUTO: 0.2 %
BILIRUB SERPL-MCNC: 0.6 MG/DL (ref 0.1–2)
BUN BLD-MCNC: 53 MG/DL (ref 7–18)
CALCIUM BLD-MCNC: 8.8 MG/DL (ref 8.5–10.1)
CHLORIDE SERPL-SCNC: 107 MMOL/L (ref 98–112)
CO2 SERPL-SCNC: 23 MMOL/L (ref 21–32)
CREAT BLD-MCNC: 1.61 MG/DL
DEPRECATED HBV CORE AB SER IA-ACNC: 85.8 NG/ML
EOSINOPHIL # BLD AUTO: 0.02 X10(3) UL (ref 0–0.7)
EOSINOPHIL NFR BLD AUTO: 0.1 %
ERYTHROCYTE [DISTWIDTH] IN BLOOD BY AUTOMATED COUNT: 16.5 %
FASTING STATUS PATIENT QL REPORTED: NO
GLOBULIN PLAS-MCNC: 3 G/DL (ref 2.8–4.4)
GLUCOSE BLD-MCNC: 169 MG/DL (ref 70–99)
HCT VFR BLD AUTO: 31.9 %
HGB BLD-MCNC: 10.4 G/DL
IMM GRANULOCYTES # BLD AUTO: 0.32 X10(3) UL (ref 0–1)
IMM GRANULOCYTES NFR BLD: 1.9 %
LDH SERPL L TO P-CCNC: 239 U/L
LYMPHOCYTES # BLD AUTO: 1.61 X10(3) UL (ref 1–4)
LYMPHOCYTES NFR BLD AUTO: 9.5 %
MCH RBC QN AUTO: 31.5 PG (ref 26–34)
MCHC RBC AUTO-ENTMCNC: 32.6 G/DL (ref 31–37)
MCV RBC AUTO: 96.7 FL
MONOCYTES # BLD AUTO: 1.3 X10(3) UL (ref 0.1–1)
MONOCYTES NFR BLD AUTO: 7.7 %
NEUTROPHILS # BLD AUTO: 13.59 X10 (3) UL (ref 1.5–7.7)
NEUTROPHILS # BLD AUTO: 13.59 X10(3) UL (ref 1.5–7.7)
NEUTROPHILS NFR BLD AUTO: 80.6 %
OSMOLALITY SERPL CALC.SUM OF ELEC: 308 MOSM/KG (ref 275–295)
PLATELET # BLD AUTO: 201 10(3)UL (ref 150–450)
POTASSIUM SERPL-SCNC: 3.6 MMOL/L (ref 3.5–5.1)
PROT SERPL-MCNC: 6.4 G/DL (ref 6.4–8.2)
RBC # BLD AUTO: 3.3 X10(6)UL
SODIUM SERPL-SCNC: 140 MMOL/L (ref 136–145)
WBC # BLD AUTO: 16.9 X10(3) UL (ref 4–11)

## 2022-04-12 PROCEDURE — 99211 OFF/OP EST MAY X REQ PHY/QHP: CPT

## 2022-04-12 PROCEDURE — 96417 CHEMO IV INFUS EACH ADDL SEQ: CPT

## 2022-04-12 PROCEDURE — 85025 COMPLETE CBC W/AUTO DIFF WBC: CPT | Performed by: INTERNAL MEDICINE

## 2022-04-12 PROCEDURE — 96413 CHEMO IV INFUSION 1 HR: CPT

## 2022-04-12 PROCEDURE — 96375 TX/PRO/DX INJ NEW DRUG ADDON: CPT

## 2022-04-12 PROCEDURE — 80053 COMPREHEN METABOLIC PANEL: CPT | Performed by: INTERNAL MEDICINE

## 2022-04-12 PROCEDURE — 82728 ASSAY OF FERRITIN: CPT | Performed by: INTERNAL MEDICINE

## 2022-04-12 PROCEDURE — 36415 COLL VENOUS BLD VENIPUNCTURE: CPT

## 2022-04-12 PROCEDURE — 83615 LACTATE (LD) (LDH) ENZYME: CPT | Performed by: INTERNAL MEDICINE

## 2022-04-12 RX ORDER — ALLOPURINOL 100 MG/1
200 TABLET ORAL DAILY
COMMUNITY
Start: 2022-04-07

## 2022-04-12 RX ORDER — COLCHICINE 0.6 MG/1
0.6 TABLET ORAL DAILY
COMMUNITY

## 2022-04-12 NOTE — PROGRESS NOTES
Pt here for C3D1. Arrives Ambulating with cane, accompanied by Self and Family member           Pregnancy screening: Not applicable    Modifications in dose or schedule: No     Frequency of blood return and site check throughout administration: Prior to administration, Prior to each drug and At completion of therapy   Discharged to Home, Ambulating with cane, accompanied by:Self and Family member    Outpatient Oncology Care Plan  Problem list:  knowledge deficit  Problems related to:    disease/disease progression  Interventions:  maintain safe environment  monitor effect of therapy  monitor lab values  provided general teaching  Expected outcomes:  optimal lab values  safe in environment  understands plan of care  Progress towards outcome:  making progress    Education Record    Learner:  Patient and Family Member  Barriers / Limitations:  None  Method:  Brief focused  Outcome:  Shows understanding  Comments: tolerated treatment. No complaints.

## 2022-04-12 NOTE — PROGRESS NOTES
Pt here for 3 week MD f/u and due for chemo. Pt's gout finger is improving, now on allopurinol, colchicine and prednisone taper. Energy level and appetite is good. Pt having chronic back pain. No bowel issues or nausea.      Outpatient Oncology Care Plan  Problem list:  fatigue  knowledge deficit    Problems related to:    disease/disease progression    Interventions:  provided general teaching    Expected outcomes:  safe in environment  symptoms relieved/minimized  understands plan of care    Progress towards outcome:  making progress    Education Record    Learner:  Patient and Family Member  Barriers / Limitations:  None  Method:  Reinforcement  Outcome:  Shows understanding  Comments:

## 2022-04-13 ENCOUNTER — OFFICE VISIT (OUTPATIENT)
Dept: HEMATOLOGY/ONCOLOGY | Age: 87
End: 2022-04-13
Attending: THORACIC SURGERY (CARDIOTHORACIC VASCULAR SURGERY)
Payer: MEDICARE

## 2022-04-13 VITALS — WEIGHT: 189 LBS | HEIGHT: 63.98 IN | BODY MASS INDEX: 32.27 KG/M2

## 2022-04-13 DIAGNOSIS — C34.12 SMALL CELL CARCINOMA OF UPPER LOBE OF LEFT LUNG (HCC): Primary | ICD-10-CM

## 2022-04-13 PROCEDURE — 96375 TX/PRO/DX INJ NEW DRUG ADDON: CPT

## 2022-04-13 PROCEDURE — 96413 CHEMO IV INFUSION 1 HR: CPT

## 2022-04-13 NOTE — PROGRESS NOTES
Pt here for C3D2 Carbo/Taxol. Arrives Ambulating with cane, accompanied by Self           Pregnancy screening: Not applicable    Modifications in dose or schedule: No     Frequency of blood return and site check throughout administration: Prior to administration   Discharged to Home, Ambulating with cane, accompanied by:Self    Outpatient Oncology Care Plan  Problem list:  plan of care  Problems related to:    side effect of treatment  Interventions:  provided general teaching  Expected outcomes:  understands plan of care  verbalize how to care for self  Progress towards outcome:  making progress    Education Record    Learner:  Patient  Barriers / Limitations:  None  Method:  Brief focused and Discussion  Outcome:  Shows understanding  Comments: Cycle 2 pt had excessive weight gain and shortness of breath on day 2 of cycle. Discussed symptoms today, pt reports \"feeling better, weight at home was 183lbs\". Denies shortness of breath today. Per Pt request, IV left in place, good blood return noted post infusion.

## 2022-04-14 ENCOUNTER — OFFICE VISIT (OUTPATIENT)
Dept: HEMATOLOGY/ONCOLOGY | Age: 87
End: 2022-04-14
Attending: THORACIC SURGERY (CARDIOTHORACIC VASCULAR SURGERY)
Payer: MEDICARE

## 2022-04-14 VITALS
HEIGHT: 63.98 IN | TEMPERATURE: 100 F | SYSTOLIC BLOOD PRESSURE: 121 MMHG | DIASTOLIC BLOOD PRESSURE: 76 MMHG | BODY MASS INDEX: 32.27 KG/M2 | WEIGHT: 189 LBS | RESPIRATION RATE: 18 BRPM | OXYGEN SATURATION: 100 % | HEART RATE: 66 BPM

## 2022-04-14 DIAGNOSIS — C34.12 SMALL CELL CARCINOMA OF UPPER LOBE OF LEFT LUNG (HCC): Primary | ICD-10-CM

## 2022-04-14 PROCEDURE — 96372 THER/PROPH/DIAG INJ SC/IM: CPT

## 2022-04-14 PROCEDURE — 96413 CHEMO IV INFUSION 1 HR: CPT

## 2022-04-14 PROCEDURE — 96375 TX/PRO/DX INJ NEW DRUG ADDON: CPT

## 2022-04-14 NOTE — PROGRESS NOTES
Pt here for C3D3. Arrives Ambulating with cane, accompanied by Self           Pregnancy screening: Denies possibility of pregnancy    Modifications in dose or schedule: No     Frequency of blood return and site check throughout administration: Prior to administration and At completion of therapy   Discharged to Home, Ambulating with cane, accompanied by:Self    Outpatient Oncology Care Plan  Problem list:  knowledge deficit  Problems related to:    chemotherapy  Interventions:  emotional support given  maintain safe environment  patient/family supported  chemotherapy teaching  monitor effect of therapy  monitor lab values  promoted rest  provided general teaching  Expected outcomes:  understands plan of care  Progress towards outcome:  making progress    Education Record    Learner:  Patient  Barriers / Limitations:  Emotional factors  Method:  Discussion  Outcome:  Shows understanding  Comments: patient ambulatory with cane. Tolerated treatment.  Discharged in stable condition

## 2022-04-29 NOTE — TELEPHONE ENCOUNTER
Patient need a refill on her Eliquis 5 mg oral tablet she is completely out of medication. Please send to Tabatha Aviles in [place] at [address].

## 2022-05-03 ENCOUNTER — OFFICE VISIT (OUTPATIENT)
Dept: HEMATOLOGY/ONCOLOGY | Age: 87
End: 2022-05-03
Attending: THORACIC SURGERY (CARDIOTHORACIC VASCULAR SURGERY)
Payer: MEDICARE

## 2022-05-03 VITALS
TEMPERATURE: 99 F | RESPIRATION RATE: 18 BRPM | OXYGEN SATURATION: 99 % | DIASTOLIC BLOOD PRESSURE: 65 MMHG | BODY MASS INDEX: 32.32 KG/M2 | HEART RATE: 83 BPM | HEIGHT: 63.98 IN | WEIGHT: 189.31 LBS | SYSTOLIC BLOOD PRESSURE: 114 MMHG

## 2022-05-03 DIAGNOSIS — M1A.0420 CHRONIC GOUT OF LEFT HAND, UNSPECIFIED CAUSE: ICD-10-CM

## 2022-05-03 DIAGNOSIS — E87.6 HYPOKALEMIA: ICD-10-CM

## 2022-05-03 DIAGNOSIS — Z51.11 ENCOUNTER FOR CHEMOTHERAPY MANAGEMENT: ICD-10-CM

## 2022-05-03 DIAGNOSIS — K21.9 GASTROESOPHAGEAL REFLUX DISEASE WITHOUT ESOPHAGITIS: ICD-10-CM

## 2022-05-03 DIAGNOSIS — C34.12 SMALL CELL CARCINOMA OF UPPER LOBE OF LEFT LUNG (HCC): Primary | ICD-10-CM

## 2022-05-03 DIAGNOSIS — I82.90 VTE (VENOUS THROMBOEMBOLISM): ICD-10-CM

## 2022-05-03 DIAGNOSIS — N18.30 STAGE 3 CHRONIC KIDNEY DISEASE, UNSPECIFIED WHETHER STAGE 3A OR 3B CKD (HCC): ICD-10-CM

## 2022-05-03 DIAGNOSIS — C34.92 SQUAMOUS CELL CARCINOMA OF LEFT LUNG (HCC): ICD-10-CM

## 2022-05-03 DIAGNOSIS — D64.9 NORMOCYTIC NORMOCHROMIC ANEMIA: ICD-10-CM

## 2022-05-03 LAB
ALBUMIN SERPL-MCNC: 3.2 G/DL (ref 3.4–5)
ALBUMIN/GLOB SERPL: 1.1 {RATIO} (ref 1–2)
ALP LIVER SERPL-CCNC: 61 U/L
ALT SERPL-CCNC: 19 U/L
ANION GAP SERPL CALC-SCNC: 9 MMOL/L (ref 0–18)
AST SERPL-CCNC: 16 U/L (ref 15–37)
BASOPHILS # BLD: 0 X10(3) UL (ref 0–0.2)
BASOPHILS NFR BLD: 0 %
BILIRUB SERPL-MCNC: 0.4 MG/DL (ref 0.1–2)
BUN BLD-MCNC: 28 MG/DL (ref 7–18)
CALCIUM BLD-MCNC: 8.4 MG/DL (ref 8.5–10.1)
CHLORIDE SERPL-SCNC: 108 MMOL/L (ref 98–112)
CO2 SERPL-SCNC: 26 MMOL/L (ref 21–32)
CREAT BLD-MCNC: 1.5 MG/DL
DEPRECATED HBV CORE AB SER IA-ACNC: 98.7 NG/ML
EOSINOPHIL # BLD: 0.13 X10(3) UL (ref 0–0.7)
EOSINOPHIL NFR BLD: 1 %
ERYTHROCYTE [DISTWIDTH] IN BLOOD BY AUTOMATED COUNT: 20.3 %
GLOBULIN PLAS-MCNC: 3 G/DL (ref 2.8–4.4)
GLUCOSE BLD-MCNC: 129 MG/DL (ref 70–99)
HCT VFR BLD AUTO: 28.6 %
HGB BLD-MCNC: 9.2 G/DL
LDH SERPL L TO P-CCNC: 297 U/L
LYMPHOCYTES NFR BLD: 1.57 X10(3) UL (ref 1–4)
LYMPHOCYTES NFR BLD: 12 %
MCH RBC QN AUTO: 31.9 PG (ref 26–34)
MCHC RBC AUTO-ENTMCNC: 32.2 G/DL (ref 31–37)
MCV RBC AUTO: 99.3 FL
MONOCYTES # BLD: 2.1 X10(3) UL (ref 0.1–1)
MONOCYTES NFR BLD: 16 %
MORPHOLOGY: NORMAL
MYELOCYTES # BLD: 0.13 X10(3) UL
MYELOCYTES NFR BLD: 1 %
NEUTROPHILS # BLD AUTO: 7.71 X10 (3) UL (ref 1.5–7.7)
NEUTROPHILS NFR BLD: 68 %
NEUTS BAND NFR BLD: 2 %
NEUTS HYPERSEG # BLD: 9.17 X10(3) UL (ref 1.5–7.7)
OSMOLALITY SERPL CALC.SUM OF ELEC: 303 MOSM/KG (ref 275–295)
PLATELET # BLD AUTO: 264 10(3)UL (ref 150–450)
PLATELET MORPHOLOGY: NORMAL
POTASSIUM SERPL-SCNC: 3 MMOL/L (ref 3.5–5.1)
PROT SERPL-MCNC: 6.2 G/DL (ref 6.4–8.2)
RBC # BLD AUTO: 2.88 X10(6)UL
SODIUM SERPL-SCNC: 143 MMOL/L (ref 136–145)
TOTAL CELLS COUNTED BLD: 100
WBC # BLD AUTO: 13.1 X10(3) UL (ref 4–11)

## 2022-05-03 PROCEDURE — 96413 CHEMO IV INFUSION 1 HR: CPT

## 2022-05-03 PROCEDURE — 82728 ASSAY OF FERRITIN: CPT

## 2022-05-03 PROCEDURE — 36415 COLL VENOUS BLD VENIPUNCTURE: CPT

## 2022-05-03 PROCEDURE — 99215 OFFICE O/P EST HI 40 MIN: CPT | Performed by: CLINICAL NURSE SPECIALIST

## 2022-05-03 PROCEDURE — 85007 BL SMEAR W/DIFF WBC COUNT: CPT

## 2022-05-03 PROCEDURE — 85027 COMPLETE CBC AUTOMATED: CPT

## 2022-05-03 PROCEDURE — 85025 COMPLETE CBC W/AUTO DIFF WBC: CPT

## 2022-05-03 PROCEDURE — 83615 LACTATE (LD) (LDH) ENZYME: CPT

## 2022-05-03 PROCEDURE — 96417 CHEMO IV INFUS EACH ADDL SEQ: CPT

## 2022-05-03 PROCEDURE — 80053 COMPREHEN METABOLIC PANEL: CPT

## 2022-05-03 PROCEDURE — 96375 TX/PRO/DX INJ NEW DRUG ADDON: CPT

## 2022-05-03 RX ORDER — OMEPRAZOLE 40 MG/1
40 CAPSULE, DELAYED RELEASE ORAL DAILY
Qty: 30 CAPSULE | Refills: 0 | Status: SHIPPED | OUTPATIENT
Start: 2022-05-03 | End: 2022-05-03

## 2022-05-03 RX ORDER — POTASSIUM CHLORIDE 20 MEQ/1
TABLET, EXTENDED RELEASE ORAL
Qty: 90 TABLET | Refills: 0 | Status: SHIPPED | OUTPATIENT
Start: 2022-05-03

## 2022-05-03 RX ORDER — POTASSIUM CHLORIDE 20 MEQ/1
20 TABLET, EXTENDED RELEASE ORAL DAILY
Qty: 30 TABLET | Refills: 0 | Status: SHIPPED | OUTPATIENT
Start: 2022-05-03 | End: 2022-05-03

## 2022-05-03 RX ORDER — OMEPRAZOLE 40 MG/1
CAPSULE, DELAYED RELEASE ORAL
Qty: 90 CAPSULE | Refills: 0 | Status: SHIPPED | OUTPATIENT
Start: 2022-05-03 | End: 2022-05-05

## 2022-05-03 NOTE — PROGRESS NOTES
Pt here for C4D1.   Arrives Ambulating independently, accompanied by Self          Modifications in dose or schedule: No    Pt denies current pregnancy     Frequency of blood return and site check throughout administration: Prior to administration   Discharged to Home, Ambulating independently, accompanied by:Self    Outpatient Oncology Care Plan  Problem list:  knowledge deficit  Problems related to:    chemotherapy  Interventions:  provided general teaching  Expected outcomes:  understands plan of care  Progress towards outcome:  making progress    Education Record    Learner:  Patient  Barriers / Limitations:  None  Method:  Brief focused  Outcome:  Shows understanding  Comments:no c/o    e

## 2022-05-03 NOTE — PROGRESS NOTES
Pt here 3 week NP f/u and due for chemo. Energy level has been fair, appetite is good. Denies pain other than chronic back pain. Pt has stewart ankle swelling. Pt takes prunes to prevent constipation. No nausea.      Outpatient Oncology Care Plan  Problem list:  fatigue  knowledge deficit    Problems related to:    disease/disease progression    Interventions:  provided general teaching    Expected outcomes:  safe in environment  symptoms relieved/minimized  understands plan of care    Progress towards outcome:  making progress    Education Record    Learner:  Patient and Family Member  Barriers / Limitations:  None  Method:  Reinforcement  Outcome:  Shows understanding  Comments: 13-Jan-2021 16:50

## 2022-05-04 ENCOUNTER — OFFICE VISIT (OUTPATIENT)
Dept: HEMATOLOGY/ONCOLOGY | Age: 87
End: 2022-05-04
Attending: THORACIC SURGERY (CARDIOTHORACIC VASCULAR SURGERY)
Payer: MEDICARE

## 2022-05-04 VITALS
HEART RATE: 87 BPM | RESPIRATION RATE: 18 BRPM | DIASTOLIC BLOOD PRESSURE: 66 MMHG | TEMPERATURE: 99 F | SYSTOLIC BLOOD PRESSURE: 120 MMHG | OXYGEN SATURATION: 95 %

## 2022-05-04 DIAGNOSIS — C34.12 SMALL CELL CARCINOMA OF UPPER LOBE OF LEFT LUNG (HCC): Primary | ICD-10-CM

## 2022-05-04 PROCEDURE — 96375 TX/PRO/DX INJ NEW DRUG ADDON: CPT

## 2022-05-04 PROCEDURE — 96413 CHEMO IV INFUSION 1 HR: CPT

## 2022-05-04 NOTE — PROGRESS NOTES
Pt here for C4D2. Arrives Ambulating with cane, accompanied by Self           Pregnancy screening: Not applicable    Modifications in dose or schedule: No     Frequency of blood return and site check throughout administration: Prior to administration and At completion of therapy   Discharged to Home, Ambulating with cane, accompanied by:Self    Outpatient Oncology Care Plan  Problem list:  knowledge deficit  Problems related to:    chemotherapy  Interventions:  emotional support given  maintain safe environment  patient/family supported  chemotherapy teaching  monitor effect of therapy  monitor lab values  promoted rest  provided general teaching  Expected outcomes:  understands plan of care  Progress towards outcome:  making progress    Education Record    Learner:  Patient  Barriers / Limitations:  None  Method:  Discussion  Outcome:  Shows understanding  Comments: patient ambulatory with no complaints. Tolerated treatment today. PIV left in for tomorrow. Discharged home in stable condition.

## 2022-05-05 ENCOUNTER — OFFICE VISIT (OUTPATIENT)
Dept: HEMATOLOGY/ONCOLOGY | Age: 87
End: 2022-05-05
Attending: THORACIC SURGERY (CARDIOTHORACIC VASCULAR SURGERY)
Payer: MEDICARE

## 2022-05-05 VITALS
WEIGHT: 194.5 LBS | SYSTOLIC BLOOD PRESSURE: 112 MMHG | TEMPERATURE: 98 F | RESPIRATION RATE: 18 BRPM | BODY MASS INDEX: 33 KG/M2 | HEART RATE: 79 BPM | OXYGEN SATURATION: 97 % | DIASTOLIC BLOOD PRESSURE: 64 MMHG

## 2022-05-05 DIAGNOSIS — C34.12 SMALL CELL CARCINOMA OF UPPER LOBE OF LEFT LUNG (HCC): Primary | ICD-10-CM

## 2022-05-05 PROCEDURE — 96413 CHEMO IV INFUSION 1 HR: CPT

## 2022-05-05 PROCEDURE — 96372 THER/PROPH/DIAG INJ SC/IM: CPT

## 2022-05-05 PROCEDURE — 96375 TX/PRO/DX INJ NEW DRUG ADDON: CPT

## 2022-05-05 RX ORDER — FUROSEMIDE 10 MG/ML
20 INJECTION INTRAMUSCULAR; INTRAVENOUS ONCE
Status: COMPLETED | OUTPATIENT
Start: 2022-05-05 | End: 2022-05-05

## 2022-05-05 RX ORDER — OMEPRAZOLE 40 MG/1
CAPSULE, DELAYED RELEASE ORAL
Qty: 30 CAPSULE | Refills: 5 | Status: SHIPPED | OUTPATIENT
Start: 2022-05-05

## 2022-05-05 RX ADMIN — FUROSEMIDE 20 MG: 10 INJECTION INTRAMUSCULAR; INTRAVENOUS at 14:02:00

## 2022-05-05 NOTE — PROGRESS NOTES
Pt here for C4D3. Arrives Ambulating with cane, accompanied by Self           Pregnancy screening: Denies possibility of pregnancy    Modifications in dose or schedule: No     Frequency of blood return and site check throughout administration: Prior to administration and At completion of therapy   Discharged to Home, Ambulating with cane, accompanied by:Self    Outpatient Oncology Care Plan  Problem list:  knowledge deficit  Problems related to:    chemotherapy  Interventions:  emotional support given  maintain safe environment  patient/family supported  chemotherapy teaching  monitor effect of therapy  monitor lab values  promoted rest  provided general teaching  Expected outcomes:  understands plan of care  Progress towards outcome:  making progress    Education Record    Learner:  Patient  Barriers / Limitations:  None  Method:  Discussion  Outcome:  Shows understanding  Comments: patient ambulatory with cane. States she feels swollen and hasn't been urinating much at home. Rima Hence APN made aware lasix IV ordered. Patient states she urinated a large amount and feels much better. Instructed patient to call if she is still having issues at home. Verbalized understanding. Tolerated treatment today. Discharged home in stable condition.

## 2022-05-07 RX ORDER — FUROSEMIDE 20 MG/1
20 TABLET ORAL DAILY PRN
Qty: 5 TABLET | Refills: 0 | Status: SHIPPED | OUTPATIENT
Start: 2022-05-07

## 2022-05-13 ENCOUNTER — OFFICE VISIT (OUTPATIENT)
Dept: FAMILY MEDICINE CLINIC | Facility: CLINIC | Age: 87
End: 2022-05-13
Payer: COMMERCIAL

## 2022-05-13 VITALS
HEART RATE: 79 BPM | HEIGHT: 64 IN | OXYGEN SATURATION: 99 % | BODY MASS INDEX: 31.76 KG/M2 | WEIGHT: 186 LBS | RESPIRATION RATE: 20 BRPM | SYSTOLIC BLOOD PRESSURE: 102 MMHG | DIASTOLIC BLOOD PRESSURE: 52 MMHG

## 2022-05-13 DIAGNOSIS — M10.042 ACUTE IDIOPATHIC GOUT OF LEFT HAND: ICD-10-CM

## 2022-05-13 DIAGNOSIS — N18.30 STAGE 3 CHRONIC KIDNEY DISEASE, UNSPECIFIED WHETHER STAGE 3A OR 3B CKD (HCC): ICD-10-CM

## 2022-05-13 DIAGNOSIS — M47.816 LUMBAR ARTHROPATHY: ICD-10-CM

## 2022-05-13 DIAGNOSIS — F33.8 SEASONAL DEPRESSION (HCC): ICD-10-CM

## 2022-05-13 DIAGNOSIS — K21.9 GASTROESOPHAGEAL REFLUX DISEASE WITHOUT ESOPHAGITIS: ICD-10-CM

## 2022-05-13 DIAGNOSIS — J41.0 SIMPLE CHRONIC BRONCHITIS (HCC): ICD-10-CM

## 2022-05-13 DIAGNOSIS — I26.92 ACUTE SADDLE PULMONARY EMBOLISM WITHOUT ACUTE COR PULMONALE (HCC): ICD-10-CM

## 2022-05-13 DIAGNOSIS — R53.0 NEOPLASTIC MALIGNANT RELATED FATIGUE: Primary | ICD-10-CM

## 2022-05-13 DIAGNOSIS — E78.00 PURE HYPERCHOLESTEROLEMIA: ICD-10-CM

## 2022-05-13 DIAGNOSIS — E03.9 ACQUIRED HYPOTHYROIDISM: ICD-10-CM

## 2022-05-13 DIAGNOSIS — L40.50 PSORIATIC ARTHRITIS (HCC): ICD-10-CM

## 2022-05-13 DIAGNOSIS — I82.442 ACUTE DEEP VEIN THROMBOSIS (DVT) OF LEFT TIBIAL VEIN (HCC): ICD-10-CM

## 2022-05-13 DIAGNOSIS — C34.12 SMALL CELL CARCINOMA OF UPPER LOBE OF LEFT LUNG (HCC): ICD-10-CM

## 2022-05-13 DIAGNOSIS — L40.9 PSORIASIS: ICD-10-CM

## 2022-05-13 DIAGNOSIS — J30.1 SEASONAL ALLERGIC RHINITIS DUE TO POLLEN: ICD-10-CM

## 2022-05-13 DIAGNOSIS — E55.9 VITAMIN D DEFICIENCY: ICD-10-CM

## 2022-05-13 DIAGNOSIS — I10 ESSENTIAL HYPERTENSION, BENIGN: ICD-10-CM

## 2022-05-13 DIAGNOSIS — I82.451 ACUTE DEEP VEIN THROMBOSIS (DVT) OF RIGHT PERONEAL VEIN (HCC): ICD-10-CM

## 2022-05-13 DIAGNOSIS — M47.812 FACET ARTHRITIS OF CERVICAL REGION: ICD-10-CM

## 2022-05-13 PROBLEM — M48.061 LUMBAR SPINAL STENOSIS: Status: RESOLVED | Noted: 2022-03-22 | Resolved: 2022-05-13

## 2022-05-13 PROBLEM — M51.369 DEGENERATION OF LUMBAR INTERVERTEBRAL DISC: Status: RESOLVED | Noted: 2022-03-22 | Resolved: 2022-05-13

## 2022-05-13 PROBLEM — M51.26 DISPLACEMENT OF LUMBAR INTERVERTEBRAL DISC: Status: RESOLVED | Noted: 2022-03-22 | Resolved: 2022-05-13

## 2022-05-13 PROBLEM — M51.36 DEGENERATION OF LUMBAR INTERVERTEBRAL DISC: Status: RESOLVED | Noted: 2022-03-22 | Resolved: 2022-05-13

## 2022-05-13 PROCEDURE — 99397 PER PM REEVAL EST PAT 65+ YR: CPT | Performed by: FAMILY MEDICINE

## 2022-05-13 PROCEDURE — 3078F DIAST BP <80 MM HG: CPT | Performed by: FAMILY MEDICINE

## 2022-05-13 PROCEDURE — G0439 PPPS, SUBSEQ VISIT: HCPCS | Performed by: FAMILY MEDICINE

## 2022-05-13 PROCEDURE — 3008F BODY MASS INDEX DOCD: CPT | Performed by: FAMILY MEDICINE

## 2022-05-13 PROCEDURE — 96160 PT-FOCUSED HLTH RISK ASSMT: CPT | Performed by: FAMILY MEDICINE

## 2022-05-13 PROCEDURE — 3074F SYST BP LT 130 MM HG: CPT | Performed by: FAMILY MEDICINE

## 2022-05-13 NOTE — PATIENT INSTRUCTIONS
Petra MAYER  Pain specialists      [address]  [place], [address] Phone: [phone] Opthalmology. Dr. Nicholas Oakes, [address]  [facility]    Phone: [phone]  LVF:[phone]. WWW. [facility]

## 2022-05-17 ENCOUNTER — HOSPITAL ENCOUNTER (OUTPATIENT)
Dept: CT IMAGING | Age: 87
Discharge: HOME | End: 2022-05-17
Attending: CLINICAL NURSE SPECIALIST
Payer: MEDICARE

## 2022-05-17 DIAGNOSIS — C34.12 SMALL CELL CARCINOMA OF UPPER LOBE OF LEFT LUNG (HCC): ICD-10-CM

## 2022-05-17 PROCEDURE — 71260 CT THORAX DX C+: CPT | Performed by: CLINICAL NURSE SPECIALIST

## 2022-05-18 ENCOUNTER — HOSPITAL ENCOUNTER (OUTPATIENT)
Dept: RADIATION ONCOLOGY | Facility: HOSPITAL | Age: 87
Discharge: HOME | End: 2022-05-18
Attending: RADIOLOGY
Payer: MEDICARE

## 2022-05-18 VITALS
HEIGHT: 64 IN | DIASTOLIC BLOOD PRESSURE: 67 MMHG | OXYGEN SATURATION: 98 % | TEMPERATURE: 99 F | WEIGHT: 190.81 LBS | SYSTOLIC BLOOD PRESSURE: 120 MMHG | BODY MASS INDEX: 32.58 KG/M2 | HEART RATE: 79 BPM | RESPIRATION RATE: 20 BRPM

## 2022-05-18 DIAGNOSIS — C34.12 SMALL CELL CARCINOMA OF UPPER LOBE OF LEFT LUNG (HCC): Primary | ICD-10-CM

## 2022-05-18 PROCEDURE — 99214 OFFICE O/P EST MOD 30 MIN: CPT

## 2022-05-18 NOTE — PATIENT INSTRUCTIONS
- WE WILL CALL TO SCHEDULE YOUR CT SIMULATION FOR RADIATION PLANNING.       - IF YOU HAVE ANY QUESTIONS OR CONCERNS REGARDING RADIATION THERAPY, PLEASE CALL [phone].

## 2022-05-20 ENCOUNTER — TELEPHONE (OUTPATIENT)
Dept: RADIATION ONCOLOGY | Facility: HOSPITAL | Age: 87
End: 2022-05-20

## 2022-05-23 ENCOUNTER — OFFICE VISIT (OUTPATIENT)
Dept: PAIN CLINIC | Facility: CLINIC | Age: 87
End: 2022-05-23
Payer: COMMERCIAL

## 2022-05-23 ENCOUNTER — HOSPITAL ENCOUNTER (OUTPATIENT)
Dept: GENERAL RADIOLOGY | Age: 87
Discharge: HOME | End: 2022-05-23
Attending: PHYSICIAN ASSISTANT
Payer: MEDICARE

## 2022-05-23 VITALS
BODY MASS INDEX: 33 KG/M2 | HEART RATE: 75 BPM | OXYGEN SATURATION: 98 % | DIASTOLIC BLOOD PRESSURE: 40 MMHG | WEIGHT: 190 LBS | SYSTOLIC BLOOD PRESSURE: 116 MMHG

## 2022-05-23 DIAGNOSIS — Z98.890 HISTORY OF LUMBAR SURGERY: Primary | ICD-10-CM

## 2022-05-23 DIAGNOSIS — M47.816 LUMBAR SPONDYLOSIS: ICD-10-CM

## 2022-05-23 DIAGNOSIS — Z98.890 HISTORY OF LUMBAR SURGERY: ICD-10-CM

## 2022-05-23 PROCEDURE — 72114 X-RAY EXAM L-S SPINE BENDING: CPT | Performed by: PHYSICIAN ASSISTANT

## 2022-05-23 PROCEDURE — 3078F DIAST BP <80 MM HG: CPT | Performed by: PHYSICIAN ASSISTANT

## 2022-05-23 PROCEDURE — 99204 OFFICE O/P NEW MOD 45 MIN: CPT | Performed by: PHYSICIAN ASSISTANT

## 2022-05-23 PROCEDURE — 3074F SYST BP LT 130 MM HG: CPT | Performed by: PHYSICIAN ASSISTANT

## 2022-05-24 ENCOUNTER — OFFICE VISIT (OUTPATIENT)
Dept: HEMATOLOGY/ONCOLOGY | Age: 87
End: 2022-05-24
Attending: THORACIC SURGERY (CARDIOTHORACIC VASCULAR SURGERY)
Payer: MEDICARE

## 2022-05-24 VITALS
HEART RATE: 79 BPM | HEIGHT: 64.02 IN | WEIGHT: 188.5 LBS | OXYGEN SATURATION: 98 % | DIASTOLIC BLOOD PRESSURE: 70 MMHG | TEMPERATURE: 98 F | SYSTOLIC BLOOD PRESSURE: 114 MMHG | BODY MASS INDEX: 32.18 KG/M2

## 2022-05-24 DIAGNOSIS — I82.90 VTE (VENOUS THROMBOEMBOLISM): ICD-10-CM

## 2022-05-24 DIAGNOSIS — E87.6 HYPOKALEMIA: ICD-10-CM

## 2022-05-24 DIAGNOSIS — C34.12 SMALL CELL CARCINOMA OF UPPER LOBE OF LEFT LUNG (HCC): Primary | ICD-10-CM

## 2022-05-24 DIAGNOSIS — C34.92 SQUAMOUS CELL CARCINOMA OF LEFT LUNG (HCC): ICD-10-CM

## 2022-05-24 DIAGNOSIS — N18.30 STAGE 3 CHRONIC KIDNEY DISEASE, UNSPECIFIED WHETHER STAGE 3A OR 3B CKD (HCC): ICD-10-CM

## 2022-05-24 DIAGNOSIS — M1A.0420 CHRONIC GOUT OF LEFT HAND, UNSPECIFIED CAUSE: ICD-10-CM

## 2022-05-24 DIAGNOSIS — D64.9 NORMOCYTIC NORMOCHROMIC ANEMIA: ICD-10-CM

## 2022-05-24 LAB
ALBUMIN SERPL-MCNC: 3.3 G/DL (ref 3.4–5)
ALBUMIN/GLOB SERPL: 1 {RATIO} (ref 1–2)
ALP LIVER SERPL-CCNC: 73 U/L
ALT SERPL-CCNC: 16 U/L
ANION GAP SERPL CALC-SCNC: 9 MMOL/L (ref 0–18)
AST SERPL-CCNC: 20 U/L (ref 15–37)
BASOPHILS # BLD AUTO: 0.1 X10(3) UL (ref 0–0.2)
BASOPHILS NFR BLD AUTO: 1.1 %
BILIRUB SERPL-MCNC: 0.3 MG/DL (ref 0.1–2)
BUN BLD-MCNC: 26 MG/DL (ref 7–18)
CALCIUM BLD-MCNC: 8.8 MG/DL (ref 8.5–10.1)
CHLORIDE SERPL-SCNC: 108 MMOL/L (ref 98–112)
CO2 SERPL-SCNC: 25 MMOL/L (ref 21–32)
CREAT BLD-MCNC: 1.53 MG/DL
DEPRECATED HBV CORE AB SER IA-ACNC: 109.8 NG/ML
EOSINOPHIL # BLD AUTO: 0.08 X10(3) UL (ref 0–0.7)
EOSINOPHIL NFR BLD AUTO: 0.9 %
ERYTHROCYTE [DISTWIDTH] IN BLOOD BY AUTOMATED COUNT: 22.4 %
FASTING STATUS PATIENT QL REPORTED: NO
GLOBULIN PLAS-MCNC: 3.2 G/DL (ref 2.8–4.4)
GLUCOSE BLD-MCNC: 157 MG/DL (ref 70–99)
HCT VFR BLD AUTO: 29.4 %
HGB BLD-MCNC: 9.4 G/DL
IMM GRANULOCYTES # BLD AUTO: 0.33 X10(3) UL (ref 0–1)
IMM GRANULOCYTES NFR BLD: 3.6 %
LDH SERPL L TO P-CCNC: 251 U/L
LYMPHOCYTES # BLD AUTO: 2.01 X10(3) UL (ref 1–4)
LYMPHOCYTES NFR BLD AUTO: 22.1 %
MCH RBC QN AUTO: 32.6 PG (ref 26–34)
MCHC RBC AUTO-ENTMCNC: 32 G/DL (ref 31–37)
MCV RBC AUTO: 102.1 FL
MONOCYTES # BLD AUTO: 1.29 X10(3) UL (ref 0.1–1)
MONOCYTES NFR BLD AUTO: 14.2 %
NEUTROPHILS # BLD AUTO: 5.28 X10 (3) UL (ref 1.5–7.7)
NEUTROPHILS # BLD AUTO: 5.28 X10(3) UL (ref 1.5–7.7)
NEUTROPHILS NFR BLD AUTO: 58.1 %
OSMOLALITY SERPL CALC.SUM OF ELEC: 302 MOSM/KG (ref 275–295)
PLATELET # BLD AUTO: 308 10(3)UL (ref 150–450)
PLATELET MORPHOLOGY: NORMAL
POTASSIUM SERPL-SCNC: 3.6 MMOL/L (ref 3.5–5.1)
PROT SERPL-MCNC: 6.5 G/DL (ref 6.4–8.2)
RBC # BLD AUTO: 2.88 X10(6)UL
SODIUM SERPL-SCNC: 142 MMOL/L (ref 136–145)
WBC # BLD AUTO: 9.1 X10(3) UL (ref 4–11)

## 2022-05-24 PROCEDURE — 99215 OFFICE O/P EST HI 40 MIN: CPT | Performed by: INTERNAL MEDICINE

## 2022-05-24 NOTE — PROGRESS NOTES
Pt here for 3 week MD f/u. Energy level is low, same as previously. Appetite is good. Pt has chronic back pain.     Outpatient Oncology Care Plan  Problem list:  fatigue  knowledge deficit    Problems related to:    disease/disease progression    Interventions:  provided general teaching    Expected outcomes:  safe in environment  symptoms relieved/minimized  understands plan of care    Progress towards outcome:  making progress    Education Record    Learner:  Patient and Family Member  Barriers / Limitations:  None  Method:  Reinforcement  Outcome:  Shows understanding  Comments:

## 2022-06-03 DIAGNOSIS — M17.11 PRIMARY OSTEOARTHRITIS OF RIGHT KNEE: ICD-10-CM

## 2022-06-06 RX ORDER — METOPROLOL SUCCINATE 50 MG/1
TABLET, EXTENDED RELEASE ORAL
Qty: 90 TABLET | Refills: 1 | Status: SHIPPED | OUTPATIENT
Start: 2022-06-06

## 2022-06-06 RX ORDER — SIMVASTATIN 20 MG
TABLET ORAL
Qty: 90 TABLET | Refills: 0 | Status: SHIPPED | OUTPATIENT
Start: 2022-06-06

## 2022-06-11 ENCOUNTER — MED REC SCAN ONLY (OUTPATIENT)
Dept: FAMILY MEDICINE CLINIC | Facility: CLINIC | Age: 87
End: 2022-06-11

## 2022-06-11 ENCOUNTER — HOSPITAL ENCOUNTER (OUTPATIENT)
Dept: MRI IMAGING | Age: 87
Discharge: HOME | End: 2022-06-11
Attending: PHYSICIAN ASSISTANT
Payer: MEDICARE

## 2022-06-11 DIAGNOSIS — Z98.890 HISTORY OF LUMBAR SURGERY: ICD-10-CM

## 2022-06-11 DIAGNOSIS — M47.816 LUMBAR SPONDYLOSIS: ICD-10-CM

## 2022-06-11 PROCEDURE — A9575 INJ GADOTERATE MEGLUMI 0.1ML: HCPCS | Performed by: PHYSICIAN ASSISTANT

## 2022-06-11 PROCEDURE — 72158 MRI LUMBAR SPINE W/O & W/DYE: CPT | Performed by: PHYSICIAN ASSISTANT

## 2022-06-15 RX ORDER — FOLIC ACID 1 MG/1
TABLET ORAL
Qty: 90 TABLET | Refills: 0 | Status: SHIPPED | OUTPATIENT
Start: 2022-06-15

## 2022-06-21 ENCOUNTER — TELEPHONE (OUTPATIENT)
Dept: PAIN CLINIC | Facility: CLINIC | Age: 87
End: 2022-06-21

## 2022-06-21 ENCOUNTER — OFFICE VISIT (OUTPATIENT)
Dept: PAIN CLINIC | Facility: CLINIC | Age: 87
End: 2022-06-21
Payer: COMMERCIAL

## 2022-06-21 VITALS — DIASTOLIC BLOOD PRESSURE: 56 MMHG | OXYGEN SATURATION: 99 % | SYSTOLIC BLOOD PRESSURE: 110 MMHG | HEART RATE: 71 BPM

## 2022-06-21 DIAGNOSIS — M51.36 DDD (DEGENERATIVE DISC DISEASE), LUMBAR: Primary | ICD-10-CM

## 2022-06-21 DIAGNOSIS — Z98.1 HISTORY OF LUMBAR FUSION: Primary | ICD-10-CM

## 2022-06-21 DIAGNOSIS — M47.816 LUMBAR SPONDYLOSIS: ICD-10-CM

## 2022-06-21 PROCEDURE — 3074F SYST BP LT 130 MM HG: CPT | Performed by: PHYSICIAN ASSISTANT

## 2022-06-21 PROCEDURE — 99214 OFFICE O/P EST MOD 30 MIN: CPT | Performed by: PHYSICIAN ASSISTANT

## 2022-06-21 PROCEDURE — 3078F DIAST BP <80 MM HG: CPT | Performed by: PHYSICIAN ASSISTANT

## 2022-06-21 NOTE — PROGRESS NOTES
Patient presents in office today with reported pain in knees, fingers, neck, toes, back    Current pain level reported = 8/10    Last reported dose of n/a      Narcotic Contract renewal n/a    Urine Drug screen n/a

## 2022-06-21 NOTE — TELEPHONE ENCOUNTER
22  RE: Eugene Man    : 1935    Dear Dr. Josh Varela    Your patient is being scheduled for a pain management procedure at [facility].    Procedure:Lumbar Facet Injections Bilateral L2/3, L3/4, L4/5  Date of Procedure: TBD  Physician: Nathan Aly- Anesthesiologist     Your patient is currently taking Eliquis.  usually recommends the medication be held for 3 days prior to injection. Please verify patient is cleared to proceed with pain management procedures.       Clearance Approved   ____________    Clearance Denied       ____________

## 2022-06-21 NOTE — TELEPHONE ENCOUNTER
Medical clearance faxed to 's office at [phone]. Confirmation rec'd. Also routed via [employer].

## 2022-06-23 ENCOUNTER — TELEPHONE (OUTPATIENT)
Dept: NEUROLOGY | Facility: CLINIC | Age: 87
End: 2022-06-23

## 2022-06-23 NOTE — TELEPHONE ENCOUNTER
Initiated PA with [facility] for bilateral L2, L3, L4 facet injection [id number], [id number], [id number] / Uploaded clinical documentation / Pending auth # [id number]

## 2022-06-24 NOTE — TELEPHONE ENCOUNTER
Prior authorization request completed for: Bilateral L2-L3, L3-L4, L4-L5 Facet injection  Authorization # [id number]  Authorization dates: 6/23/2022 - 9/21/2022  CPT codes approved: 40307 / 11803 / 77609  Number of visits/dates of service approved: 1  Physician: Dr. Bernardo Romo   Location: [facility]     Patient can be scheduled. Routed to Navigator.

## 2022-06-25 ENCOUNTER — MED REC SCAN ONLY (OUTPATIENT)
Dept: FAMILY MEDICINE CLINIC | Facility: CLINIC | Age: 87
End: 2022-06-25

## 2022-06-27 NOTE — TELEPHONE ENCOUNTER
Haritha Wilson 3 days ago     DT       Prior authorization request completed for: Bilateral L2-L3, L3-L4, L4-L5 Facet injection  Authorization # [id number]  Authorization dates: 6/23/2022 - 9/21/2022  CPT codes approved: 16618 / 79423 / 77593  Number of visits/dates of service approved: 1  Physician: Dr. Blanca Land   Location: [facility]  Patient can be scheduled. Routed to Navigator.

## 2022-06-29 ENCOUNTER — SOCIAL WORK SERVICES (OUTPATIENT)
Dept: HEMATOLOGY/ONCOLOGY | Facility: HOSPITAL | Age: 87
End: 2022-06-29

## 2022-06-29 NOTE — PROGRESS NOTES
completed Daughter Yasmin Chu, faxing to Trevon at N#[phone], and emailing secure to [email or web address]

## 2022-06-30 ENCOUNTER — TELEPHONE (OUTPATIENT)
Dept: FAMILY MEDICINE CLINIC | Facility: CLINIC | Age: 87
End: 2022-06-30

## 2022-06-30 RX ORDER — FUROSEMIDE 20 MG/1
TABLET ORAL
Qty: 30 TABLET | Refills: 0 | Status: SHIPPED | OUTPATIENT
Start: 2022-06-30 | End: 2022-07-01

## 2022-06-30 NOTE — TELEPHONE ENCOUNTER
Last refilled 5/7/2022      ONLY #5 for leg swelling by Dr Yelitza Lakhani      Last ordered: 1 month ago by Ericka Elkins MD

## 2022-06-30 NOTE — TELEPHONE ENCOUNTER
Received request from this office for last OV note, any imaging pertaining to the kidneys, recent lab results. Faxed over to [phone] last OV note 5/13/22, labs 5/24/22 and CT chest 5/17/22.   Fax confirmation received

## 2022-07-01 RX ORDER — FUROSEMIDE 20 MG/1
TABLET ORAL
Qty: 90 TABLET | Refills: 0 | Status: SHIPPED | OUTPATIENT
Start: 2022-07-01

## 2022-07-13 ENCOUNTER — TELEPHONE (OUTPATIENT)
Dept: HEMATOLOGY/ONCOLOGY | Facility: HOSPITAL | Age: 87
End: 2022-07-13

## 2022-07-13 ENCOUNTER — TELEPHONE (OUTPATIENT)
Dept: PAIN CLINIC | Facility: CLINIC | Age: 87
End: 2022-07-13

## 2022-07-13 NOTE — TELEPHONE ENCOUNTER
Patient called and needs consent for the patient to receive injections in her back. Dr. Jennifer Galvan is the pain doctor. She said that Dr. Ludy Chamberlain office faxed a form. Please call patient when done.

## 2022-07-13 NOTE — TELEPHONE ENCOUNTER
Patient would like to proceed with procedure as discussed with Zeus MAYER.  Please contact to assist.

## 2022-07-18 ENCOUNTER — HOSPITAL ENCOUNTER (EMERGENCY)
Age: 87
Discharge: HOME | End: 2022-07-18
Attending: EMERGENCY MEDICINE
Payer: MEDICARE

## 2022-07-18 ENCOUNTER — APPOINTMENT (OUTPATIENT)
Dept: GENERAL RADIOLOGY | Age: 87
End: 2022-07-18
Attending: EMERGENCY MEDICINE
Payer: MEDICARE

## 2022-07-18 ENCOUNTER — TELEPHONE (OUTPATIENT)
Dept: FAMILY MEDICINE CLINIC | Facility: CLINIC | Age: 87
End: 2022-07-18

## 2022-07-18 ENCOUNTER — APPOINTMENT (OUTPATIENT)
Dept: CT IMAGING | Age: 87
End: 2022-07-18
Attending: EMERGENCY MEDICINE
Payer: MEDICARE

## 2022-07-18 VITALS
HEIGHT: 65 IN | SYSTOLIC BLOOD PRESSURE: 121 MMHG | OXYGEN SATURATION: 99 % | BODY MASS INDEX: 30.82 KG/M2 | TEMPERATURE: 98 F | WEIGHT: 185 LBS | HEART RATE: 64 BPM | DIASTOLIC BLOOD PRESSURE: 41 MMHG | RESPIRATION RATE: 13 BRPM

## 2022-07-18 DIAGNOSIS — R07.89 CHEST PAIN, ATYPICAL: Primary | ICD-10-CM

## 2022-07-18 DIAGNOSIS — M54.12 CERVICAL RADICULOPATHY: ICD-10-CM

## 2022-07-18 LAB
ALBUMIN SERPL-MCNC: 3.4 G/DL (ref 3.4–5)
ALBUMIN/GLOB SERPL: 1.1 {RATIO} (ref 1–2)
ALP LIVER SERPL-CCNC: 85 U/L
ALT SERPL-CCNC: 15 U/L
ANION GAP SERPL CALC-SCNC: 8 MMOL/L (ref 0–18)
AST SERPL-CCNC: 18 U/L (ref 15–37)
BASOPHILS # BLD AUTO: 0.04 X10(3) UL (ref 0–0.2)
BASOPHILS NFR BLD AUTO: 0.7 %
BILIRUB SERPL-MCNC: 0.3 MG/DL (ref 0.1–2)
BUN BLD-MCNC: 27 MG/DL (ref 7–18)
CALCIUM BLD-MCNC: 8.9 MG/DL (ref 8.5–10.1)
CHLORIDE SERPL-SCNC: 110 MMOL/L (ref 98–112)
CO2 SERPL-SCNC: 24 MMOL/L (ref 21–32)
CREAT BLD-MCNC: 1.22 MG/DL
EOSINOPHIL # BLD AUTO: 0.44 X10(3) UL (ref 0–0.7)
EOSINOPHIL NFR BLD AUTO: 7.5 %
ERYTHROCYTE [DISTWIDTH] IN BLOOD BY AUTOMATED COUNT: 14.6 %
GLOBULIN PLAS-MCNC: 3 G/DL (ref 2.8–4.4)
GLUCOSE BLD-MCNC: 116 MG/DL (ref 70–99)
HCT VFR BLD AUTO: 35 %
HGB BLD-MCNC: 11.5 G/DL
IMM GRANULOCYTES # BLD AUTO: 0.01 X10(3) UL (ref 0–1)
IMM GRANULOCYTES NFR BLD: 0.2 %
LYMPHOCYTES # BLD AUTO: 2.09 X10(3) UL (ref 1–4)
LYMPHOCYTES NFR BLD AUTO: 35.6 %
MCH RBC QN AUTO: 32.5 PG (ref 26–34)
MCHC RBC AUTO-ENTMCNC: 32.9 G/DL (ref 31–37)
MCV RBC AUTO: 98.9 FL
MONOCYTES # BLD AUTO: 0.76 X10(3) UL (ref 0.1–1)
MONOCYTES NFR BLD AUTO: 12.9 %
NEUTROPHILS # BLD AUTO: 2.53 X10 (3) UL (ref 1.5–7.7)
NEUTROPHILS # BLD AUTO: 2.53 X10(3) UL (ref 1.5–7.7)
NEUTROPHILS NFR BLD AUTO: 43.1 %
OSMOLALITY SERPL CALC.SUM OF ELEC: 300 MOSM/KG (ref 275–295)
PLATELET # BLD AUTO: 150 10(3)UL (ref 150–450)
POTASSIUM SERPL-SCNC: 3.8 MMOL/L (ref 3.5–5.1)
PROT SERPL-MCNC: 6.4 G/DL (ref 6.4–8.2)
RBC # BLD AUTO: 3.54 X10(6)UL
SODIUM SERPL-SCNC: 142 MMOL/L (ref 136–145)
TROPONIN I HIGH SENSITIVITY: 6 NG/L
WBC # BLD AUTO: 5.9 X10(3) UL (ref 4–11)

## 2022-07-18 PROCEDURE — 93010 ELECTROCARDIOGRAM REPORT: CPT

## 2022-07-18 PROCEDURE — 85025 COMPLETE CBC W/AUTO DIFF WBC: CPT | Performed by: EMERGENCY MEDICINE

## 2022-07-18 PROCEDURE — 71045 X-RAY EXAM CHEST 1 VIEW: CPT | Performed by: EMERGENCY MEDICINE

## 2022-07-18 PROCEDURE — 93005 ELECTROCARDIOGRAM TRACING: CPT

## 2022-07-18 PROCEDURE — 71275 CT ANGIOGRAPHY CHEST: CPT | Performed by: EMERGENCY MEDICINE

## 2022-07-18 PROCEDURE — 80053 COMPREHEN METABOLIC PANEL: CPT | Performed by: EMERGENCY MEDICINE

## 2022-07-18 PROCEDURE — 96375 TX/PRO/DX INJ NEW DRUG ADDON: CPT

## 2022-07-18 PROCEDURE — 84484 ASSAY OF TROPONIN QUANT: CPT | Performed by: EMERGENCY MEDICINE

## 2022-07-18 PROCEDURE — 99285 EMERGENCY DEPT VISIT HI MDM: CPT

## 2022-07-18 PROCEDURE — 96374 THER/PROPH/DIAG INJ IV PUSH: CPT

## 2022-07-18 RX ORDER — HYDROCODONE BITARTRATE AND ACETAMINOPHEN 5; 325 MG/1; MG/1
1-2 TABLET ORAL EVERY 6 HOURS PRN
Qty: 20 TABLET | Refills: 0 | Status: SHIPPED | OUTPATIENT
Start: 2022-07-18 | End: 2022-07-23

## 2022-07-18 RX ORDER — MORPHINE SULFATE 4 MG/ML
4 INJECTION, SOLUTION INTRAMUSCULAR; INTRAVENOUS ONCE
Status: COMPLETED | OUTPATIENT
Start: 2022-07-18 | End: 2022-07-18

## 2022-07-18 RX ORDER — ONDANSETRON 2 MG/ML
4 INJECTION INTRAMUSCULAR; INTRAVENOUS ONCE
Status: COMPLETED | OUTPATIENT
Start: 2022-07-18 | End: 2022-07-18

## 2022-07-18 RX ORDER — PREDNISONE 20 MG/1
40 TABLET ORAL DAILY
Qty: 10 TABLET | Refills: 0 | Status: SHIPPED | OUTPATIENT
Start: 2022-07-18 | End: 2022-07-23

## 2022-07-18 NOTE — TELEPHONE ENCOUNTER
Pt states she has been having pain in left side of neck, under left breast radiating down her left arm & fingers & has tingling sensation. She states today she started feeling SOB. Pt has h/o lung cancer & left partial lobectomy. I advised pt to go to ER to be evaluated. All questions answered, pt expresses understanding.

## 2022-07-18 NOTE — ED INITIAL ASSESSMENT (HPI)
To er with c/o left sided neck pain that radiates to left shoulder and into left arm. States worsening of pain since Friday. Reports today with increase sob.   Green productive cough

## 2022-07-18 NOTE — TELEPHONE ENCOUNTER
Pt states for the last few days she has been having pain under breast, down her arm down to her left hand, tingling sensation. Please advise.

## 2022-07-19 LAB
ATRIAL RATE: 71 BPM
P-R INTERVAL: 168 MS
Q-T INTERVAL: 392 MS
QRS DURATION: 78 MS
QTC CALCULATION (BEZET): 425 MS
R AXIS: -13 DEGREES
T AXIS: 111 DEGREES
VENTRICULAR RATE: 71 BPM

## 2022-07-21 ENCOUNTER — HOSPITAL ENCOUNTER (OUTPATIENT)
Dept: GENERAL RADIOLOGY | Age: 87
Discharge: HOME | End: 2022-07-21
Attending: NURSE PRACTITIONER
Payer: MEDICARE

## 2022-07-21 ENCOUNTER — OFFICE VISIT (OUTPATIENT)
Dept: FAMILY MEDICINE CLINIC | Facility: CLINIC | Age: 87
End: 2022-07-21
Payer: COMMERCIAL

## 2022-07-21 VITALS
OXYGEN SATURATION: 98 % | HEIGHT: 65 IN | WEIGHT: 197 LBS | DIASTOLIC BLOOD PRESSURE: 60 MMHG | SYSTOLIC BLOOD PRESSURE: 110 MMHG | RESPIRATION RATE: 20 BRPM | BODY MASS INDEX: 32.82 KG/M2 | HEART RATE: 60 BPM

## 2022-07-21 DIAGNOSIS — M47.22 CERVICAL SPONDYLOSIS WITH RADICULOPATHY: ICD-10-CM

## 2022-07-21 DIAGNOSIS — M47.814 OSTEOARTHRITIS OF THORACIC SPINE, UNSPECIFIED SPINAL OSTEOARTHRITIS COMPLICATION STATUS: ICD-10-CM

## 2022-07-21 DIAGNOSIS — M47.22 CERVICAL SPONDYLOSIS WITH RADICULOPATHY: Primary | ICD-10-CM

## 2022-07-21 PROCEDURE — 72072 X-RAY EXAM THORAC SPINE 3VWS: CPT | Performed by: NURSE PRACTITIONER

## 2022-07-21 PROCEDURE — 3078F DIAST BP <80 MM HG: CPT | Performed by: NURSE PRACTITIONER

## 2022-07-21 PROCEDURE — 3074F SYST BP LT 130 MM HG: CPT | Performed by: NURSE PRACTITIONER

## 2022-07-21 PROCEDURE — 3008F BODY MASS INDEX DOCD: CPT | Performed by: NURSE PRACTITIONER

## 2022-07-21 PROCEDURE — 72050 X-RAY EXAM NECK SPINE 4/5VWS: CPT | Performed by: NURSE PRACTITIONER

## 2022-07-21 PROCEDURE — 99214 OFFICE O/P EST MOD 30 MIN: CPT | Performed by: NURSE PRACTITIONER

## 2022-07-22 ENCOUNTER — TELEPHONE (OUTPATIENT)
Dept: HEMATOLOGY/ONCOLOGY | Facility: HOSPITAL | Age: 87
End: 2022-07-22

## 2022-07-22 NOTE — TELEPHONE ENCOUNTER
Patient called regarding clearance for a procedure. She wanted to let Dr. Joanie Moe know that there is no date yet until she clears the patient.

## 2022-07-25 ENCOUNTER — TELEPHONE (OUTPATIENT)
Dept: FAMILY MEDICINE CLINIC | Facility: CLINIC | Age: 87
End: 2022-07-25

## 2022-07-25 DIAGNOSIS — G89.29 CHRONIC LEFT-SIDED LOW BACK PAIN, UNSPECIFIED WHETHER SCIATICA PRESENT: Primary | ICD-10-CM

## 2022-07-25 DIAGNOSIS — M54.50 CHRONIC LEFT-SIDED LOW BACK PAIN, UNSPECIFIED WHETHER SCIATICA PRESENT: Primary | ICD-10-CM

## 2022-07-25 DIAGNOSIS — M47.22 CERVICAL SPONDYLOSIS WITH RADICULOPATHY: ICD-10-CM

## 2022-07-25 NOTE — TELEPHONE ENCOUNTER
----- Message from LADONNA Mclean sent at 7/25/2022  7:10 AM CDT -----  Hardware fusion -no fracture , no swelling

## 2022-07-25 NOTE — TELEPHONE ENCOUNTER
----- Message from LADONNA Royal sent at 7/25/2022  7:09 AM CDT -----  No fracture , degenerative disc disease - referral to see Neurosurgeon for evaluation

## 2022-07-25 NOTE — TELEPHONE ENCOUNTER
Patient called and xray results's shared. Patient verbalized understanding.  She will call Dr Carlos A Nair as well as schedule MRI

## 2022-07-26 DIAGNOSIS — F33.8 SEASONAL DEPRESSION (HCC): ICD-10-CM

## 2022-07-27 RX ORDER — FLUOXETINE HYDROCHLORIDE 20 MG/1
CAPSULE ORAL
Qty: 90 CAPSULE | Refills: 1 | Status: SHIPPED | OUTPATIENT
Start: 2022-07-27

## 2022-07-29 ENCOUNTER — HOSPITAL ENCOUNTER (OUTPATIENT)
Dept: MRI IMAGING | Age: 87
Discharge: HOME | End: 2022-07-29
Attending: NURSE PRACTITIONER
Payer: MEDICARE

## 2022-07-29 DIAGNOSIS — M47.22 CERVICAL SPONDYLOSIS WITH RADICULOPATHY: ICD-10-CM

## 2022-07-29 PROCEDURE — 72141 MRI NECK SPINE W/O DYE: CPT | Performed by: NURSE PRACTITIONER

## 2022-08-01 ENCOUNTER — TELEPHONE (OUTPATIENT)
Dept: FAMILY MEDICINE CLINIC | Facility: CLINIC | Age: 87
End: 2022-08-01

## 2022-08-01 NOTE — TELEPHONE ENCOUNTER
----- Message from LADONNA Vera sent at 8/1/2022  8:03 AM CDT -----  Referral to see Neuro - mild to moderate DJD    Referral already placed for neuro on 7/25/22

## 2022-08-02 ENCOUNTER — HOSPITAL ENCOUNTER (OUTPATIENT)
Facility: HOSPITAL | Age: 87
Setting detail: HOSPITAL OUTPATIENT SURGERY
Discharge: HOME | End: 2022-08-02
Attending: ANESTHESIOLOGY | Admitting: ANESTHESIOLOGY
Payer: MEDICARE

## 2022-08-02 ENCOUNTER — APPOINTMENT (OUTPATIENT)
Dept: GENERAL RADIOLOGY | Facility: HOSPITAL | Age: 87
End: 2022-08-02
Attending: ANESTHESIOLOGY
Payer: MEDICARE

## 2022-08-02 ENCOUNTER — TELEPHONE (OUTPATIENT)
Dept: FAMILY MEDICINE CLINIC | Facility: CLINIC | Age: 87
End: 2022-08-02

## 2022-08-02 VITALS
DIASTOLIC BLOOD PRESSURE: 60 MMHG | SYSTOLIC BLOOD PRESSURE: 142 MMHG | TEMPERATURE: 99 F | RESPIRATION RATE: 16 BRPM | OXYGEN SATURATION: 99 % | HEART RATE: 60 BPM

## 2022-08-02 DIAGNOSIS — E03.9 ACQUIRED HYPOTHYROIDISM: ICD-10-CM

## 2022-08-02 DIAGNOSIS — M51.36 DDD (DEGENERATIVE DISC DISEASE), LUMBAR: ICD-10-CM

## 2022-08-02 PROCEDURE — 64494 INJ PARAVERT F JNT L/S 2 LEV: CPT | Performed by: ANESTHESIOLOGY

## 2022-08-02 PROCEDURE — 64493 INJ PARAVERT F JNT L/S 1 LEV: CPT | Performed by: ANESTHESIOLOGY

## 2022-08-02 PROCEDURE — BR16ZZZ FLUOROSCOPY OF LUMBAR FACET JOINT(S): ICD-10-PCS | Performed by: ANESTHESIOLOGY

## 2022-08-02 PROCEDURE — 3E0U33Z INTRODUCTION OF ANTI-INFLAMMATORY INTO JOINTS, PERCUTANEOUS APPROACH: ICD-10-PCS | Performed by: ANESTHESIOLOGY

## 2022-08-02 PROCEDURE — 3E0U3BZ INTRODUCTION OF ANESTHETIC AGENT INTO JOINTS, PERCUTANEOUS APPROACH: ICD-10-PCS | Performed by: ANESTHESIOLOGY

## 2022-08-02 RX ORDER — LIDOCAINE HYDROCHLORIDE 10 MG/ML
INJECTION, SOLUTION EPIDURAL; INFILTRATION; INTRACAUDAL; PERINEURAL
Status: DISCONTINUED | OUTPATIENT
Start: 2022-08-02 | End: 2022-08-02

## 2022-08-02 RX ORDER — METHYLPREDNISOLONE ACETATE 40 MG/ML
INJECTION, SUSPENSION INTRA-ARTICULAR; INTRALESIONAL; INTRAMUSCULAR; SOFT TISSUE
Status: DISCONTINUED | OUTPATIENT
Start: 2022-08-02 | End: 2022-08-02

## 2022-08-02 RX ORDER — BUPIVACAINE HYDROCHLORIDE 5 MG/ML
INJECTION, SOLUTION EPIDURAL; INTRACAUDAL
Status: DISCONTINUED | OUTPATIENT
Start: 2022-08-02 | End: 2022-08-02

## 2022-08-02 NOTE — TELEPHONE ENCOUNTER
Dr. Priscila Saul office called and requested a med list. Faxed at [phone], received confirmation.

## 2022-08-02 NOTE — OPERATIVE REPORT
[facility]  Operative Report  2022     Jessee Rodrigez 149 Patient Status:  Hospital Outpatient Surgery    1935 MRN [id number]   Location [address] [address] Attending Brigitte Chow MD   Hosp Day # 0 PCP Dayron Sepulveda MD     Indication: Chase Timmons is a 82year old female with a history of prior lumbar fusion and facet arthropathy    Imaging: Lumbar MRI    Preoperative Diagnosis:  DDD (degenerative disc disease), lumbar [M51.36]    Postoperative Diagnosis: Same as above. Procedure performed: LUMBAR FACET INJECTION BILATERAL (L2, L3, L4) with local    Anesthesia: Local     EBL: Less than 1 ml. Procedure Description:   After reviewing the patient's history and performing a focused physical examination, the diagnosis was confirmed and contraindications such as infection and coagulopathy were ruled out. Following review of potential side effects and complications, including but not necessarily limited to infection, allergic reaction, local tissue breakdown, nerve injury, and paresis, the patient indicated they understood and agreed to proceed. After obtaining the informed consent, the patient was brought to the procedure room and monitored. In the prone position, following sterile prep and drape of the lumbar region, the corresponding facet joints were identified under fluoroscopy. The skin was anesthetized via 25-gauge 1.5\" needle with approximately 2 mL of 1% lidocaine. A 22-gauge 3.5\" Quincke spinal needle was introduced and advanced into the left L2, L3, L4 levels atraumatically under fluoroscopic guidance. Following negative aspiration for CSF and blood, approximately 1 mL of 1% lidocaine with 5 mg of methylprednisolone was injected into each joint without complication. The needle was withdrawn with stylet in situ after being flushed with 0.5 mL lidocaine.   3 additional needles were advanced in a similar fashion into the facet joint of the right L2, L3, L4 vertebral body levels. Injection was completed same as above. The patient tolerated procedure very well. The patient was observed until discharge criteria met. Discharge instructions were given and patient was released to a responsible adult. Complications: None. Follow up: The patient was followed in the pain clinic as needed basis.       Josefina Gonzalez MD

## 2022-08-03 ENCOUNTER — OFFICE VISIT (OUTPATIENT)
Dept: FAMILY MEDICINE CLINIC | Facility: CLINIC | Age: 87
End: 2022-08-03
Payer: COMMERCIAL

## 2022-08-03 VITALS
SYSTOLIC BLOOD PRESSURE: 130 MMHG | RESPIRATION RATE: 16 BRPM | HEIGHT: 65 IN | HEART RATE: 76 BPM | DIASTOLIC BLOOD PRESSURE: 60 MMHG | WEIGHT: 197 LBS | BODY MASS INDEX: 32.82 KG/M2 | OXYGEN SATURATION: 98 %

## 2022-08-03 DIAGNOSIS — H25.11 CATARACT, NUCLEAR SCLEROTIC SENILE, RIGHT: ICD-10-CM

## 2022-08-03 DIAGNOSIS — M54.6 DORSALGIA OF THORACIC REGION: ICD-10-CM

## 2022-08-03 DIAGNOSIS — E03.9 ACQUIRED HYPOTHYROIDISM: ICD-10-CM

## 2022-08-03 DIAGNOSIS — Z01.818 PREOPERATIVE GENERAL PHYSICAL EXAMINATION: Primary | ICD-10-CM

## 2022-08-03 DIAGNOSIS — M47.22 CERVICAL SPONDYLOSIS WITH RADICULOPATHY: ICD-10-CM

## 2022-08-03 DIAGNOSIS — M47.816 LUMBAR ARTHROPATHY: ICD-10-CM

## 2022-08-03 DIAGNOSIS — I10 ESSENTIAL HYPERTENSION, BENIGN: ICD-10-CM

## 2022-08-03 PROCEDURE — 3008F BODY MASS INDEX DOCD: CPT | Performed by: NURSE PRACTITIONER

## 2022-08-03 PROCEDURE — 99214 OFFICE O/P EST MOD 30 MIN: CPT | Performed by: NURSE PRACTITIONER

## 2022-08-03 PROCEDURE — 3078F DIAST BP <80 MM HG: CPT | Performed by: NURSE PRACTITIONER

## 2022-08-03 PROCEDURE — 3075F SYST BP GE 130 - 139MM HG: CPT | Performed by: NURSE PRACTITIONER

## 2022-08-03 RX ORDER — LEVOTHYROXINE SODIUM 50 MCG
TABLET ORAL
Qty: 90 TABLET | Refills: 1 | Status: SHIPPED | OUTPATIENT
Start: 2022-08-03

## 2022-08-09 ENCOUNTER — OFFICE VISIT (OUTPATIENT)
Dept: PAIN CLINIC | Facility: CLINIC | Age: 87
End: 2022-08-09
Payer: COMMERCIAL

## 2022-08-09 ENCOUNTER — TELEPHONE (OUTPATIENT)
Dept: PAIN CLINIC | Facility: CLINIC | Age: 87
End: 2022-08-09

## 2022-08-09 VITALS — OXYGEN SATURATION: 99 % | SYSTOLIC BLOOD PRESSURE: 110 MMHG | DIASTOLIC BLOOD PRESSURE: 58 MMHG | HEART RATE: 62 BPM

## 2022-08-09 DIAGNOSIS — Z98.890 HISTORY OF LUMBAR SURGERY: Primary | ICD-10-CM

## 2022-08-09 DIAGNOSIS — M47.816 LUMBAR SPONDYLOSIS: ICD-10-CM

## 2022-08-09 PROCEDURE — 3078F DIAST BP <80 MM HG: CPT | Performed by: PHYSICIAN ASSISTANT

## 2022-08-09 PROCEDURE — 3074F SYST BP LT 130 MM HG: CPT | Performed by: PHYSICIAN ASSISTANT

## 2022-08-09 PROCEDURE — 99214 OFFICE O/P EST MOD 30 MIN: CPT | Performed by: PHYSICIAN ASSISTANT

## 2022-08-09 RX ORDER — HYDROXYCHLOROQUINE SULFATE 200 MG/1
200 TABLET, FILM COATED ORAL 2 TIMES DAILY
COMMUNITY
Start: 2022-08-03

## 2022-08-09 NOTE — TELEPHONE ENCOUNTER
22  RE: Flavio Fearing    : 1935    Dear Dr. Tirso Jiang,    Your patient is being scheduled for a pain management procedure at [facility].    Procedure:  Lumbar SANJIV  Date of Procedure: TBD  Physician: Soheila Easley- Anesthesiologist     Your patient is currently taking Eliquis.  usually recommends the medication be held for 3 days prior to injection. Please verify patient is cleared to proceed with pain management procedures.       Clearance Approved   ___X_________    Clearance Denied       ____________

## 2022-08-09 NOTE — PROGRESS NOTES
Last procedure: LUMBAR FACET INJECTION BILATERAL (L2, L3, L4) with local  Date: 8/2/22  Percentage of relief obtained: 0%  Duration of relief: none    Current Pain Score: 7/10

## 2022-08-10 ENCOUNTER — TELEPHONE (OUTPATIENT)
Dept: NEUROLOGY | Facility: CLINIC | Age: 87
End: 2022-08-10

## 2022-08-10 NOTE — TELEPHONE ENCOUNTER
Prior authorization request completed for: RUSSELL   Authorization # [id number]  Authorization dates: 8/10/2022 - 11/8/2022  CPT codes approved: 95074  Number of visits/dates of service approved: 1  Physician: Dr. Daljit Mitchell   Location: [facility]     Patient can be scheduled. Routed to Navigator.

## 2022-08-10 NOTE — TELEPHONE ENCOUNTER
22  RE: Can Mobley    : 1935    Dear Dr. Aron Smalls,    Your patient is being scheduled for a pain management procedure at [facility].    Procedure:  Lumbar SANJIV  Date of Procedure: TBD  Physician: Carmen Rosas- Anesthesiologist     Your patient is currently taking Eliquis.  usually recommends the medication be held for 3 days prior to injection. Please verify patient is cleared to proceed with pain management procedures.       Clearance Approved   ___X_________    Clearance Denied       ____________

## 2022-08-15 ENCOUNTER — OFFICE VISIT (OUTPATIENT)
Dept: ORTHOPEDICS CLINIC | Facility: CLINIC | Age: 87
End: 2022-08-15
Payer: COMMERCIAL

## 2022-08-15 VITALS — HEIGHT: 64 IN | WEIGHT: 185 LBS | BODY MASS INDEX: 31.58 KG/M2

## 2022-08-15 DIAGNOSIS — M17.0 PRIMARY OSTEOARTHRITIS OF BOTH KNEES: Primary | ICD-10-CM

## 2022-08-15 RX ORDER — VALSARTAN 80 MG/1
TABLET ORAL
COMMUNITY
Start: 2022-08-05

## 2022-08-15 RX ORDER — LIDOCAINE HYDROCHLORIDE 20 MG/ML
1 SOLUTION ORAL; TOPICAL 2 TIMES DAILY
COMMUNITY
Start: 2022-08-05

## 2022-08-15 RX ORDER — TRIAMCINOLONE ACETONIDE 40 MG/ML
80 INJECTION, SUSPENSION INTRA-ARTICULAR; INTRAMUSCULAR ONCE
Status: COMPLETED | OUTPATIENT
Start: 2022-08-15 | End: 2022-08-15

## 2022-08-15 RX ORDER — ALLOPURINOL 300 MG/1
300 TABLET ORAL DAILY
COMMUNITY
Start: 2022-08-03

## 2022-08-15 RX ORDER — TIOTROPIUM BROMIDE INHALATION SPRAY 3.12 UG/1
2 SPRAY, METERED RESPIRATORY (INHALATION) DAILY
COMMUNITY
Start: 2022-08-09

## 2022-08-15 RX ORDER — PREDNISOLONE ACETATE 10 MG/ML
1 SUSPENSION/ DROPS OPHTHALMIC 4 TIMES DAILY
COMMUNITY
Start: 2022-08-12

## 2022-08-15 RX ADMIN — TRIAMCINOLONE ACETONIDE 80 MG: 40 INJECTION, SUSPENSION INTRA-ARTICULAR; INTRAMUSCULAR at 16:17:00

## 2022-08-15 NOTE — PROCEDURES
After informed consent, the patient's right and left knees were marked, locally anesthetized with skin refrigerant, prepped with topical antiseptic, and injected with a mixture of 1mL 40mg/mL Kenalog, 2mL 1% lidocaine and 2mL 0.5% marcaine through the inferolateral portal.  A band-aid was applied. The patient tolerated the procedure well.     Robbie Rey MD, [address] [facility]  Phone [phone]  Fax [phone]

## 2022-08-16 ENCOUNTER — OFFICE VISIT (OUTPATIENT)
Dept: SURGERY | Facility: CLINIC | Age: 87
End: 2022-08-16
Payer: COMMERCIAL

## 2022-08-16 VITALS — SYSTOLIC BLOOD PRESSURE: 122 MMHG | DIASTOLIC BLOOD PRESSURE: 80 MMHG | HEART RATE: 66 BPM

## 2022-08-16 DIAGNOSIS — M54.2 CERVICALGIA: Primary | ICD-10-CM

## 2022-08-16 DIAGNOSIS — Z98.1 S/P CERVICAL SPINAL FUSION: ICD-10-CM

## 2022-08-16 PROCEDURE — 3079F DIAST BP 80-89 MM HG: CPT | Performed by: PHYSICIAN ASSISTANT

## 2022-08-16 PROCEDURE — 99213 OFFICE O/P EST LOW 20 MIN: CPT | Performed by: PHYSICIAN ASSISTANT

## 2022-08-16 PROCEDURE — 3074F SYST BP LT 130 MM HG: CPT | Performed by: PHYSICIAN ASSISTANT

## 2022-08-16 PROCEDURE — 1125F AMNT PAIN NOTED PAIN PRSNT: CPT | Performed by: PHYSICIAN ASSISTANT

## 2022-08-16 NOTE — PROGRESS NOTES
Pt here for neck pain. Pt had neck surgery 2018 but did not help. Pain travels down to left arm. Pt has N/T down Left fingers. Has difficulty turning her head a certain way.  Pt had imaging    No injection  Pt is schedule to go to Pt

## 2022-08-19 ENCOUNTER — TELEPHONE (OUTPATIENT)
Dept: HEMATOLOGY/ONCOLOGY | Age: 87
End: 2022-08-19

## 2022-08-22 ENCOUNTER — OFFICE VISIT (OUTPATIENT)
Dept: PAIN CLINIC | Facility: CLINIC | Age: 87
End: 2022-08-22
Payer: COMMERCIAL

## 2022-08-22 ENCOUNTER — TELEPHONE (OUTPATIENT)
Dept: NEUROLOGY | Facility: CLINIC | Age: 87
End: 2022-08-22

## 2022-08-22 VITALS
HEART RATE: 60 BPM | BODY MASS INDEX: 31.64 KG/M2 | WEIGHT: 185.31 LBS | OXYGEN SATURATION: 98 % | DIASTOLIC BLOOD PRESSURE: 60 MMHG | RESPIRATION RATE: 16 BRPM | HEIGHT: 64 IN | SYSTOLIC BLOOD PRESSURE: 100 MMHG

## 2022-08-22 DIAGNOSIS — M47.816 LUMBAR SPONDYLOSIS: ICD-10-CM

## 2022-08-22 DIAGNOSIS — M51.36 DDD (DEGENERATIVE DISC DISEASE), LUMBAR: ICD-10-CM

## 2022-08-22 DIAGNOSIS — M50.30 DDD (DEGENERATIVE DISC DISEASE), CERVICAL: Primary | ICD-10-CM

## 2022-08-22 DIAGNOSIS — M47.22 CERVICAL SPONDYLOSIS WITH RADICULOPATHY: ICD-10-CM

## 2022-08-22 DIAGNOSIS — M48.061 SPINAL STENOSIS OF LUMBAR REGION WITHOUT NEUROGENIC CLAUDICATION: ICD-10-CM

## 2022-08-22 DIAGNOSIS — M47.812 FACET ARTHRITIS OF CERVICAL REGION: ICD-10-CM

## 2022-08-22 PROCEDURE — 3008F BODY MASS INDEX DOCD: CPT | Performed by: ANESTHESIOLOGY

## 2022-08-22 PROCEDURE — 3074F SYST BP LT 130 MM HG: CPT | Performed by: ANESTHESIOLOGY

## 2022-08-22 PROCEDURE — 1125F AMNT PAIN NOTED PAIN PRSNT: CPT | Performed by: ANESTHESIOLOGY

## 2022-08-22 PROCEDURE — 3078F DIAST BP <80 MM HG: CPT | Performed by: ANESTHESIOLOGY

## 2022-08-22 PROCEDURE — 99215 OFFICE O/P EST HI 40 MIN: CPT | Performed by: ANESTHESIOLOGY

## 2022-08-22 NOTE — PROGRESS NOTES
Last procedure: LUMBAR FACET INJECTION BILATERAL (L2, L3, L4) with local    Date: 08/02/2022    Percentage of relief obtained: 10%     Duration of relief: None    Current Pain Score: 7.5/10

## 2022-08-22 NOTE — TELEPHONE ENCOUNTER
Initiated PA with [facility] for left C4, [address], C6 MBB [id number], [id number], [id number] / Uploaded clinical documentation / Case # [id number]

## 2022-08-22 NOTE — TELEPHONE ENCOUNTER
22    RE: Mia Bourgeois    : 1935    Dear Dr. Eddie Helms,    Your patient is being scheduled for a pain management procedure at [facility] within the next 4 weeks. Procedure:  left C4, 5, 6 Medial Branch Block AND Lumbar SANJIV  Physician: - Anesthesiologist     Your patient is currently taking apixaban ,  usually recommends the medication be held for 3 days prior to injection. Please verify patient is cleared to proceed with pain management procedures.

## 2022-08-23 NOTE — TELEPHONE ENCOUNTER
Prior authorization request completed for: Left C4, C5, C6 MBB   Authorization # [id number]  Authorization dates: 8/22/2022 - 11/20/2022  CPT codes approved: 84583 / 67260 / 03895  Number of visits/dates of service approved: 1  Physician: Dr. Meridee Cushing   Location: [facility]     Patient can be scheduled. Routed to Navigator.

## 2022-08-23 NOTE — TELEPHONE ENCOUNTER
Received medical clearance from  , patient is cleared to hold Apixaban for 3 days prior to injection. Sent to scan.

## 2022-08-23 NOTE — TELEPHONE ENCOUNTER
Marv Mrecer 7:57 AM  Note  Prior authorization request completed for: Left C4, C5, C6 MBB   Authorization # [id number]  Authorization dates: 8/22/2022 - 11/20/2022  CPT codes approved: 45638 / 97322 / 54597  Number of visits/dates of service approved: 1  Physician: Dr. Kurt Darling   Location: [facility]  Patient can be scheduled. Routed to Navigator.

## 2022-08-24 ENCOUNTER — HOSPITAL ENCOUNTER (OUTPATIENT)
Dept: MRI IMAGING | Age: 87
Discharge: HOME | End: 2022-08-24
Attending: INTERNAL MEDICINE
Payer: MEDICARE

## 2022-08-24 ENCOUNTER — HOSPITAL ENCOUNTER (OUTPATIENT)
Dept: CT IMAGING | Age: 87
Discharge: HOME | End: 2022-08-24
Attending: INTERNAL MEDICINE
Payer: MEDICARE

## 2022-08-24 DIAGNOSIS — C34.12 SMALL CELL CARCINOMA OF UPPER LOBE OF LEFT LUNG (HCC): ICD-10-CM

## 2022-08-24 DIAGNOSIS — C34.12 SMALL CELL CARCINOMA OF UPPER LOBE OF LEFT LUNG (HCC): Primary | ICD-10-CM

## 2022-08-24 PROCEDURE — 71250 CT THORAX DX C-: CPT | Performed by: INTERNAL MEDICINE

## 2022-08-24 PROCEDURE — 70553 MRI BRAIN STEM W/O & W/DYE: CPT | Performed by: INTERNAL MEDICINE

## 2022-08-24 PROCEDURE — 74176 CT ABD & PELVIS W/O CONTRAST: CPT | Performed by: INTERNAL MEDICINE

## 2022-08-24 PROCEDURE — A9575 INJ GADOTERATE MEGLUMI 0.1ML: HCPCS | Performed by: INTERNAL MEDICINE

## 2022-08-24 NOTE — PROGRESS NOTES
Patient is in the MRI scanner. MRI tech inquiring whether Dr. Nava Jose wants to pursue MRI w/contrast.  Order is for plain brain MRI. Prior imaging was with and without contrast.  Spoke to Dr. Nava Jose who would like to also do MRI brain with contrast therefore order was placed. Last serum creatinine 7/18 1. 22. Per MRI tech, no creatinine required.     Yasir CALHOUN, ANP-BC, [address] [facility]

## 2022-08-25 ENCOUNTER — TELEPHONE (OUTPATIENT)
Dept: HEMATOLOGY/ONCOLOGY | Facility: HOSPITAL | Age: 87
End: 2022-08-25

## 2022-08-30 ENCOUNTER — OFFICE VISIT (OUTPATIENT)
Dept: HEMATOLOGY/ONCOLOGY | Age: 87
End: 2022-08-30
Attending: THORACIC SURGERY (CARDIOTHORACIC VASCULAR SURGERY)
Payer: MEDICARE

## 2022-08-30 VITALS
BODY MASS INDEX: 32.5 KG/M2 | WEIGHT: 190.38 LBS | HEIGHT: 64.02 IN | HEART RATE: 93 BPM | TEMPERATURE: 98 F | RESPIRATION RATE: 18 BRPM | SYSTOLIC BLOOD PRESSURE: 132 MMHG | OXYGEN SATURATION: 99 % | DIASTOLIC BLOOD PRESSURE: 68 MMHG

## 2022-08-30 DIAGNOSIS — D64.9 NORMOCYTIC NORMOCHROMIC ANEMIA: Primary | ICD-10-CM

## 2022-08-30 DIAGNOSIS — C34.92 SQUAMOUS CELL CARCINOMA OF LEFT LUNG (HCC): ICD-10-CM

## 2022-08-30 DIAGNOSIS — I82.90 VTE (VENOUS THROMBOEMBOLISM): ICD-10-CM

## 2022-08-30 DIAGNOSIS — C34.12 SMALL CELL CARCINOMA OF UPPER LOBE OF LEFT LUNG (HCC): ICD-10-CM

## 2022-08-30 DIAGNOSIS — Z79.01 CHRONIC ANTICOAGULATION: ICD-10-CM

## 2022-08-30 DIAGNOSIS — N18.30 STAGE 3 CHRONIC KIDNEY DISEASE, UNSPECIFIED WHETHER STAGE 3A OR 3B CKD (HCC): ICD-10-CM

## 2022-08-30 DIAGNOSIS — I26.92 ACUTE SADDLE PULMONARY EMBOLISM WITHOUT ACUTE COR PULMONALE (HCC): ICD-10-CM

## 2022-08-30 DIAGNOSIS — I82.451 ACUTE DEEP VEIN THROMBOSIS (DVT) OF RIGHT PERONEAL VEIN (HCC): ICD-10-CM

## 2022-08-30 LAB
ALBUMIN SERPL-MCNC: 3.4 G/DL (ref 3.4–5)
ALBUMIN/GLOB SERPL: 1 {RATIO} (ref 1–2)
ALP LIVER SERPL-CCNC: 67 U/L
ALT SERPL-CCNC: 19 U/L
ANION GAP SERPL CALC-SCNC: 9 MMOL/L (ref 0–18)
AST SERPL-CCNC: 15 U/L (ref 15–37)
BASOPHILS # BLD AUTO: 0.06 X10(3) UL (ref 0–0.2)
BASOPHILS NFR BLD AUTO: 0.6 %
BILIRUB SERPL-MCNC: 0.4 MG/DL (ref 0.1–2)
BUN BLD-MCNC: 48 MG/DL (ref 7–18)
CALCIUM BLD-MCNC: 9.2 MG/DL (ref 8.5–10.1)
CHLORIDE SERPL-SCNC: 113 MMOL/L (ref 98–112)
CO2 SERPL-SCNC: 18 MMOL/L (ref 21–32)
CREAT BLD-MCNC: 1.7 MG/DL
EOSINOPHIL # BLD AUTO: 0.34 X10(3) UL (ref 0–0.7)
EOSINOPHIL NFR BLD AUTO: 3.6 %
ERYTHROCYTE [DISTWIDTH] IN BLOOD BY AUTOMATED COUNT: 14.4 %
FASTING STATUS PATIENT QL REPORTED: NO
GFR SERPLBLD BASED ON 1.73 SQ M-ARVRAT: 29 ML/MIN/1.73M2 (ref 60–?)
GLOBULIN PLAS-MCNC: 3.4 G/DL (ref 2.8–4.4)
GLUCOSE BLD-MCNC: 150 MG/DL (ref 70–99)
HCT VFR BLD AUTO: 37.9 %
HGB BLD-MCNC: 12.4 G/DL
IMM GRANULOCYTES # BLD AUTO: 0.04 X10(3) UL (ref 0–1)
IMM GRANULOCYTES NFR BLD: 0.4 %
LDH SERPL L TO P-CCNC: 150 U/L
LYMPHOCYTES # BLD AUTO: 1.98 X10(3) UL (ref 1–4)
LYMPHOCYTES NFR BLD AUTO: 20.8 %
MCH RBC QN AUTO: 31.5 PG (ref 26–34)
MCHC RBC AUTO-ENTMCNC: 32.7 G/DL (ref 31–37)
MCV RBC AUTO: 96.2 FL
MONOCYTES # BLD AUTO: 0.72 X10(3) UL (ref 0.1–1)
MONOCYTES NFR BLD AUTO: 7.6 %
NEUTROPHILS # BLD AUTO: 6.38 X10 (3) UL (ref 1.5–7.7)
NEUTROPHILS # BLD AUTO: 6.38 X10(3) UL (ref 1.5–7.7)
NEUTROPHILS NFR BLD AUTO: 67 %
OSMOLALITY SERPL CALC.SUM OF ELEC: 305 MOSM/KG (ref 275–295)
PLATELET # BLD AUTO: 179 10(3)UL (ref 150–450)
POTASSIUM SERPL-SCNC: 4.3 MMOL/L (ref 3.5–5.1)
PROT SERPL-MCNC: 6.8 G/DL (ref 6.4–8.2)
RBC # BLD AUTO: 3.94 X10(6)UL
SODIUM SERPL-SCNC: 140 MMOL/L (ref 136–145)
WBC # BLD AUTO: 9.5 X10(3) UL (ref 4–11)

## 2022-08-30 PROCEDURE — 83615 LACTATE (LD) (LDH) ENZYME: CPT | Performed by: INTERNAL MEDICINE

## 2022-08-30 PROCEDURE — 99211 OFF/OP EST MAY X REQ PHY/QHP: CPT

## 2022-08-30 PROCEDURE — 36415 COLL VENOUS BLD VENIPUNCTURE: CPT

## 2022-08-30 PROCEDURE — 85025 COMPLETE CBC W/AUTO DIFF WBC: CPT | Performed by: INTERNAL MEDICINE

## 2022-08-30 PROCEDURE — 80053 COMPREHEN METABOLIC PANEL: CPT | Performed by: INTERNAL MEDICINE

## 2022-08-30 RX ORDER — POTASSIUM CHLORIDE 20 MEQ/1
TABLET, EXTENDED RELEASE ORAL
Qty: 90 TABLET | Refills: 0 | Status: SHIPPED | OUTPATIENT
Start: 2022-08-30

## 2022-08-30 NOTE — PROGRESS NOTES
Pt here for 3 month MD f/emmanuel. Pt had CT scan last week. Pt has had multiple pain inj in back, neck and knees. Energy level has been low, same since finishing treatment \"I don't do much\". Appetite has been lower, but still eating enough. Pt has no SOB or cough, \"but again I don't do much\".      Outpatient Oncology Care Plan  Problem list:  fatigue  knowledge deficit    Problems related to:    disease/disease progression    Interventions:  provided general teaching    Expected outcomes:  safe in environment  symptoms relieved/minimized  understands plan of care    Progress towards outcome:  making progress    Education Record    Learner:  Patient dtr  Barriers / Limitations:  None  Method:  Reinforcement  Outcome:  Shows understanding  Comments:

## 2022-09-09 RX ORDER — APIXABAN 5 MG/1
TABLET, FILM COATED ORAL
Qty: 60 TABLET | Refills: 3 | Status: SHIPPED | OUTPATIENT
Start: 2022-09-09 | End: 2022-12-15

## 2022-09-12 RX ORDER — FOLIC ACID 1 MG/1
TABLET ORAL
Qty: 90 TABLET | Refills: 0 | Status: SHIPPED | OUTPATIENT
Start: 2022-09-12

## 2022-09-13 ENCOUNTER — HOSPITAL ENCOUNTER (OUTPATIENT)
Facility: HOSPITAL | Age: 87
Setting detail: HOSPITAL OUTPATIENT SURGERY
Discharge: HOME | End: 2022-09-13
Attending: ANESTHESIOLOGY | Admitting: ANESTHESIOLOGY
Payer: MEDICARE

## 2022-09-13 ENCOUNTER — APPOINTMENT (OUTPATIENT)
Dept: GENERAL RADIOLOGY | Facility: HOSPITAL | Age: 87
End: 2022-09-13
Attending: ANESTHESIOLOGY
Payer: MEDICARE

## 2022-09-13 VITALS
DIASTOLIC BLOOD PRESSURE: 54 MMHG | SYSTOLIC BLOOD PRESSURE: 111 MMHG | TEMPERATURE: 97 F | HEART RATE: 57 BPM | OXYGEN SATURATION: 100 % | WEIGHT: 190 LBS | RESPIRATION RATE: 16 BRPM | HEIGHT: 64.02 IN | BODY MASS INDEX: 32.44 KG/M2

## 2022-09-13 DIAGNOSIS — M50.30 DDD (DEGENERATIVE DISC DISEASE), CERVICAL: ICD-10-CM

## 2022-09-13 PROCEDURE — 3E0T3BZ INTRODUCTION OF ANESTHETIC AGENT INTO PERIPHERAL NERVES AND PLEXI, PERCUTANEOUS APPROACH: ICD-10-PCS | Performed by: ANESTHESIOLOGY

## 2022-09-13 PROCEDURE — 64490 INJ PARAVERT F JNT C/T 1 LEV: CPT | Performed by: ANESTHESIOLOGY

## 2022-09-13 PROCEDURE — 64491 INJ PARAVERT F JNT C/T 2 LEV: CPT | Performed by: ANESTHESIOLOGY

## 2022-09-13 PROCEDURE — 3E0T33Z INTRODUCTION OF ANTI-INFLAMMATORY INTO PERIPHERAL NERVES AND PLEXI, PERCUTANEOUS APPROACH: ICD-10-PCS | Performed by: ANESTHESIOLOGY

## 2022-09-13 RX ORDER — SODIUM CHLORIDE 9 MG/ML
INJECTION INTRAVENOUS
Status: DISCONTINUED | OUTPATIENT
Start: 2022-09-13 | End: 2022-09-13

## 2022-09-13 RX ORDER — DEXAMETHASONE SODIUM PHOSPHATE 10 MG/ML
INJECTION, SOLUTION INTRAMUSCULAR; INTRAVENOUS
Status: DISCONTINUED | OUTPATIENT
Start: 2022-09-13 | End: 2022-09-13

## 2022-09-13 RX ORDER — LIDOCAINE HYDROCHLORIDE 10 MG/ML
INJECTION, SOLUTION EPIDURAL; INFILTRATION; INTRACAUDAL; PERINEURAL
Status: DISCONTINUED | OUTPATIENT
Start: 2022-09-13 | End: 2022-09-13

## 2022-09-13 NOTE — OPERATIVE REPORT
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 Procedure performed: LEFT CERVICAL MEDIAL BRANCH BLOCK with local    Anesthesia: Local    EBL: Less than 1 ml. Procedure Description:  After reviewing the patient's history and performing a focused physical examination, the diagnosis was confirmed and contraindications such as infection and coagulopathy were ruled out. Following review of allergy, potential side effects and complications, including but not necessarily limited to infection, allergic reaction, local tissue breakdown, nerve injury, and paresis, the patient indicated they understood and agreed to proceed. After obtaining the informed consent, the patient was brought to the procedure room and monitored. In the prone position, following sterile prep and drape of the cervical region, the articular pillars of the corresponding facet joints were identified under fluoroscopy. The skin and subcutaneous tissue were anesthetized via 25-gauge 1-1/2 inch needle with approximately 1 mL of 1% lidocaine. Under fluoroscopy, posterolateral approach was used to position a 22-gauge, 3-1/2-inch spinal needle adjacent to the mid portion of the corresponding articular pillar at C4. The needle position was confirmed in AP and lateral views. Following negative aspiration for CSF and blood, 0.5 mL 1% lidocaine and 3 mg of preservative-free Decadron were injected.   2 additional needles were placed in a similar fashion at the C5 and C6 vertebral body levels. The needles were removed with tips intact. The patient tolerated the procedure very well. No complications were encountered during or immediately after the procedure. The patient was observed until discharge criteria met. Discharge instructions were given and patient was released to a responsible adult. Complications: None. Follow up:  In clinic as needed    Stacy Easton MD

## 2022-09-14 RX ORDER — SIMVASTATIN 20 MG
TABLET ORAL
Qty: 90 TABLET | Refills: 0 | Status: SHIPPED | OUTPATIENT
Start: 2022-09-14

## 2022-09-20 ENCOUNTER — TELEPHONE (OUTPATIENT)
Dept: FAMILY MEDICINE CLINIC | Facility: CLINIC | Age: 87
End: 2022-09-20

## 2022-09-20 ENCOUNTER — MED REC SCAN ONLY (OUTPATIENT)
Dept: FAMILY MEDICINE CLINIC | Facility: CLINIC | Age: 87
End: 2022-09-20

## 2022-09-20 NOTE — TELEPHONE ENCOUNTER
Pt wants to know what Dr Olivia Pinedo opinion is regarding her getting the shingles vaccine due to the meds she is on. Also wants to know if she should get the 5th covid vaccine.

## 2022-09-20 NOTE — TELEPHONE ENCOUNTER
LOV 8/03/22  Pt requesting advice regarding getting the Shingles vaccine due to her medications and if you recommend she get the 5th covid vaccine.  Please advise

## 2022-09-27 ENCOUNTER — APPOINTMENT (OUTPATIENT)
Dept: GENERAL RADIOLOGY | Facility: HOSPITAL | Age: 87
End: 2022-09-27
Attending: ANESTHESIOLOGY

## 2022-09-27 ENCOUNTER — HOSPITAL ENCOUNTER (OUTPATIENT)
Facility: HOSPITAL | Age: 87
Setting detail: HOSPITAL OUTPATIENT SURGERY
Discharge: HOME | End: 2022-09-27
Attending: ANESTHESIOLOGY | Admitting: ANESTHESIOLOGY

## 2022-09-27 VITALS
TEMPERATURE: 98 F | OXYGEN SATURATION: 100 % | SYSTOLIC BLOOD PRESSURE: 120 MMHG | RESPIRATION RATE: 16 BRPM | HEART RATE: 60 BPM | DIASTOLIC BLOOD PRESSURE: 51 MMHG

## 2022-09-27 DIAGNOSIS — M48.061 SPINAL STENOSIS OF LUMBAR REGION WITHOUT NEUROGENIC CLAUDICATION: ICD-10-CM

## 2022-09-27 PROCEDURE — 62323 NJX INTERLAMINAR LMBR/SAC: CPT | Performed by: ANESTHESIOLOGY

## 2022-09-27 PROCEDURE — 3E0R33Z INTRODUCTION OF ANTI-INFLAMMATORY INTO SPINAL CANAL, PERCUTANEOUS APPROACH: ICD-10-PCS | Performed by: ANESTHESIOLOGY

## 2022-09-27 RX ORDER — SODIUM CHLORIDE 9 MG/ML
INJECTION INTRAVENOUS
Status: DISCONTINUED | OUTPATIENT
Start: 2022-09-27 | End: 2022-09-27

## 2022-09-27 RX ORDER — LIDOCAINE HYDROCHLORIDE 10 MG/ML
INJECTION, SOLUTION EPIDURAL; INFILTRATION; INTRACAUDAL; PERINEURAL
Status: DISCONTINUED | OUTPATIENT
Start: 2022-09-27 | End: 2022-09-27

## 2022-09-27 RX ORDER — DEXAMETHASONE SODIUM PHOSPHATE 10 MG/ML
INJECTION, SOLUTION INTRAMUSCULAR; INTRAVENOUS
Status: DISCONTINUED | OUTPATIENT
Start: 2022-09-27 | End: 2022-09-27

## 2022-09-27 NOTE — OPERATIVE REPORT
[facility]  Operative Report  2022     Jessee Rodrigez [address] Patient Status:  Hospital Outpatient Surgery    1935 MRN [id number]   Location [id number] [place] [address] Attending Eliana Linton MD   Hosp Day # 0 PCP Dank Otero MD     Indication: Ge Wood is a 82year old female lumbar radiculopathy and spinal stenosis    Imaging: Lumbar MRI    Preoperative Diagnosis:  Spinal stenosis of lumbar region without neurogenic claudication [M48.061]    Postoperative Diagnosis: Same as above. Procedure performed: LUMBAR INTERLAMINAR EPIDURAL INJECTION with local    Anesthesia: Local .    EBL: Less than 1 ml. Procedure Description:  After reviewing the patient's history and performing a focused physical examination, the diagnosis and positive previous diagnostic tests were confirmed and contraindications such as infection and coagulopathy were ruled out. Following review of allergies and potential side effects and complications, including but not necessarily limited to infection, allergic reaction, local tissue breakdown, nerve injury, post-dural puncture headache and paresis, the patient consented for the procedure. The patient was brought to the procedure room in prone position. After sterile prep lumbar spine, the L4-L5 interspace was identified with the help of fluoroscopy. Local anesthetic was given by a 25 gauge 1.5 inch needle with 1% lidocaine in that space level. Thereafter, a 20 gauge Tuohy needle was introduced and advanced under fluoroscopy. The epidural space was accessed by using loss of resistance to air technique. The needle position was confirmed with AP and lateral view under fluoroscopy. Omnipaque 180 was injected in 1 mL volume. A good epidurogram was obtained. Thereafter, dexamethasone 10 mg with normal saline of 5 mL in total volume of 6 mL was injected through the Tuohy needle. The needle was flushed with 1 mL lidocaine.   The needle was withdrawn with the stylet intact in situ. The needle's tip was intact. The patient tolerated the procedure very well without significant immediate complication. The patient's back was cleaned and sterile dressing was applied. The patient was discharged with an instruction to a responsible adult after discharge criteria were met. The patient was advised to contact us should any problems happen. The patient was also informed to go to the emergency room immediately if experiencing severe numbness or weakness in the extremities or experiencing bowel or bladder incontinence. Complications: None. Follow up: The patient was followed in the pain clinic as needed basis.           Kelli Calero MD

## 2022-11-01 ENCOUNTER — OFFICE VISIT (OUTPATIENT)
Dept: FAMILY MEDICINE CLINIC | Facility: CLINIC | Age: 87
End: 2022-11-01
Payer: COMMERCIAL

## 2022-11-01 VITALS — BODY MASS INDEX: 33 KG/M2 | HEIGHT: 64 IN

## 2022-11-01 DIAGNOSIS — B37.2 CANDIDIASIS OF SKIN: Primary | ICD-10-CM

## 2022-11-01 DIAGNOSIS — R73.09 ABNORMAL GLUCOSE: ICD-10-CM

## 2022-11-01 LAB
CARTRIDGE LOT#: 963 NUMERIC
HEMOGLOBIN A1C: 5.8 % (ref 4.3–5.6)

## 2022-11-01 PROCEDURE — 83036 HEMOGLOBIN GLYCOSYLATED A1C: CPT | Performed by: NURSE PRACTITIONER

## 2022-11-01 PROCEDURE — 99214 OFFICE O/P EST MOD 30 MIN: CPT | Performed by: NURSE PRACTITIONER

## 2022-11-01 RX ORDER — NYSTATIN 100000 U/G
1 CREAM TOPICAL AS NEEDED
Qty: 30 G | Refills: 5 | Status: SHIPPED | OUTPATIENT
Start: 2022-11-01 | End: 2022-11-11

## 2022-11-01 RX ORDER — NYSTATIN 100000 [USP'U]/G
1 POWDER TOPICAL 4 TIMES DAILY
Qty: 60 G | Refills: 3 | Status: SHIPPED | OUTPATIENT
Start: 2022-11-01 | End: 2022-11-11

## 2022-11-01 NOTE — PROGRESS NOTES
Micheal   I, as the nurse practitioner, have personally performed the services documented here and agree that the documentation accurately represents the services and the medical decisions I made. I have reviewed the documentation of the Nursing notes , Review of Systems, Past Medical History, Past Surgical History, Family History and Social History and made changes or additions as needed.

## 2022-11-14 ENCOUNTER — TELEPHONE (OUTPATIENT)
Dept: FAMILY MEDICINE CLINIC | Facility: CLINIC | Age: 87
End: 2022-11-14

## 2022-11-14 DIAGNOSIS — B37.2 CANDIDIASIS OF SKIN: Primary | ICD-10-CM

## 2022-11-14 RX ORDER — FLUCONAZOLE 150 MG/1
150 TABLET ORAL DAILY
Qty: 5 TABLET | Refills: 0 | Status: SHIPPED | OUTPATIENT
Start: 2022-11-14 | End: 2022-11-19

## 2022-11-14 NOTE — TELEPHONE ENCOUNTER
Pt states Nystatin cream has not helped the rash under her breasts at all. Please advise on any orders for pt.   LOV 11/1

## 2022-11-30 ENCOUNTER — HOSPITAL ENCOUNTER (OUTPATIENT)
Dept: CT IMAGING | Age: 87
Discharge: HOME | End: 2022-11-30
Attending: INTERNAL MEDICINE
Payer: MEDICARE

## 2022-11-30 ENCOUNTER — TELEPHONE (OUTPATIENT)
Dept: HEMATOLOGY/ONCOLOGY | Facility: HOSPITAL | Age: 87
End: 2022-11-30

## 2022-11-30 DIAGNOSIS — C34.92 SQUAMOUS CELL CARCINOMA OF LEFT LUNG (HCC): ICD-10-CM

## 2022-11-30 DIAGNOSIS — C34.12 SMALL CELL CARCINOMA OF UPPER LOBE OF LEFT LUNG (HCC): ICD-10-CM

## 2022-11-30 LAB
CREAT BLD-MCNC: 1.9 MG/DL
GFR SERPLBLD BASED ON 1.73 SQ M-ARVRAT: 26 ML/MIN/1.73M2 (ref 60–?)

## 2022-11-30 PROCEDURE — 71250 CT THORAX DX C-: CPT | Performed by: INTERNAL MEDICINE

## 2022-11-30 PROCEDURE — 82565 ASSAY OF CREATININE: CPT

## 2022-11-30 RX ORDER — POTASSIUM CHLORIDE 20 MEQ/1
TABLET, EXTENDED RELEASE ORAL
Qty: 90 TABLET | Refills: 0 | Status: SHIPPED | OUTPATIENT
Start: 2022-11-30

## 2022-12-05 RX ORDER — SIMVASTATIN 20 MG
TABLET ORAL
Qty: 90 TABLET | Refills: 0 | Status: SHIPPED | OUTPATIENT
Start: 2022-12-05

## 2022-12-09 RX ORDER — FOLIC ACID 1 MG/1
TABLET ORAL
Qty: 90 TABLET | Refills: 0 | Status: SHIPPED | OUTPATIENT
Start: 2022-12-09

## 2022-12-28 ENCOUNTER — SOCIAL WORK SERVICES (OUTPATIENT)
Dept: HEMATOLOGY/ONCOLOGY | Facility: HOSPITAL | Age: 87
End: 2022-12-28

## 2022-12-28 NOTE — PROGRESS NOTES
SW spoke to pt's daughter, Duc Dailey concerning FMLA. CARMEN assisted with Daughter Vesna Carey, faxing to [facility] at L#[phone], and emailing secure to [email or web address] [email or web address] Toure, RICHARD   at [facility], [facility], [place], [address] [facility] [address] [place] [address], [facility] Vinicio Smiley  Ph: [phone]. [email or web address]  Fax: [phone]

## 2023-01-04 ENCOUNTER — OFFICE VISIT (OUTPATIENT)
Dept: FAMILY MEDICINE CLINIC | Facility: CLINIC | Age: 88
End: 2023-01-04
Payer: MEDICARE

## 2023-01-04 ENCOUNTER — TELEPHONE (OUTPATIENT)
Dept: FAMILY MEDICINE CLINIC | Facility: CLINIC | Age: 88
End: 2023-01-04

## 2023-01-04 VITALS
SYSTOLIC BLOOD PRESSURE: 117 MMHG | BODY MASS INDEX: 31.58 KG/M2 | RESPIRATION RATE: 16 BRPM | DIASTOLIC BLOOD PRESSURE: 44 MMHG | HEART RATE: 68 BPM | HEIGHT: 64 IN | WEIGHT: 185 LBS

## 2023-01-04 DIAGNOSIS — B37.89 CANDIDIASIS OF BREAST: Primary | ICD-10-CM

## 2023-01-04 DIAGNOSIS — B37.31 CANDIDIASIS OF FEMALE GENITALIA: ICD-10-CM

## 2023-01-04 LAB
APPEARANCE: CLEAR
BILIRUBIN: NEGATIVE
GLUCOSE (URINE DIPSTICK): NEGATIVE MG/DL
KETONES (URINE DIPSTICK): NEGATIVE MG/DL
MULTISTIX EXPIRATION DATE: YELLOW DATE
MULTISTIX LOT#: CLEAR NUMERIC
NITRITE, URINE: NEGATIVE
OCCULT BLOOD: NEGATIVE
PH, URINE: 5.5 (ref 4.5–8)
PROTEIN (URINE DIPSTICK): NEGATIVE MG/DL
SPECIFIC GRAVITY: 1.03 (ref 1–1.03)
URINE-COLOR: YELLOW
UROBILINOGEN,SEMI-QN: 0.2 MG/DL (ref 0–1.9)

## 2023-01-04 PROCEDURE — 99214 OFFICE O/P EST MOD 30 MIN: CPT | Performed by: NURSE PRACTITIONER

## 2023-01-04 PROCEDURE — 87086 URINE CULTURE/COLONY COUNT: CPT | Performed by: NURSE PRACTITIONER

## 2023-01-04 PROCEDURE — 3078F DIAST BP <80 MM HG: CPT | Performed by: NURSE PRACTITIONER

## 2023-01-04 PROCEDURE — 3074F SYST BP LT 130 MM HG: CPT | Performed by: NURSE PRACTITIONER

## 2023-01-04 PROCEDURE — 81003 URINALYSIS AUTO W/O SCOPE: CPT | Performed by: NURSE PRACTITIONER

## 2023-01-04 PROCEDURE — 3008F BODY MASS INDEX DOCD: CPT | Performed by: NURSE PRACTITIONER

## 2023-01-04 RX ORDER — FLUCONAZOLE 200 MG/1
200 TABLET ORAL DAILY
Qty: 7 TABLET | Refills: 2 | Status: SHIPPED | OUTPATIENT
Start: 2023-01-04 | End: 2023-01-11

## 2023-01-04 NOTE — TELEPHONE ENCOUNTER
Pharmacy called to state that there is a drug interaction between fluconazole and colchicine. Please advise.

## 2023-01-04 NOTE — TELEPHONE ENCOUNTER
Message left on [facility] VM with below orders & pt notified of below orders. All questions answered, pt expresses understanding.

## 2023-01-09 ENCOUNTER — TELEPHONE (OUTPATIENT)
Dept: FAMILY MEDICINE CLINIC | Facility: CLINIC | Age: 88
End: 2023-01-09

## 2023-01-25 DIAGNOSIS — F33.8 SEASONAL DEPRESSION (HCC): ICD-10-CM

## 2023-01-25 RX ORDER — FLUOXETINE HYDROCHLORIDE 20 MG/1
CAPSULE ORAL
Qty: 90 CAPSULE | Refills: 1 | Status: SHIPPED | OUTPATIENT
Start: 2023-01-25

## 2023-02-06 ENCOUNTER — TELEPHONE (OUTPATIENT)
Dept: HEMATOLOGY/ONCOLOGY | Facility: HOSPITAL | Age: 88
End: 2023-02-06

## 2023-02-06 NOTE — TELEPHONE ENCOUNTER
Pt aware, she was due f/u appt with [facility]Q in Nov 2022. Will schedule, imaging to be ordered at that visit.

## 2023-02-14 DIAGNOSIS — E03.9 ACQUIRED HYPOTHYROIDISM: ICD-10-CM

## 2023-02-14 RX ORDER — LEVOTHYROXINE SODIUM 50 MCG
TABLET ORAL
Qty: 90 TABLET | Refills: 1 | Status: SHIPPED | OUTPATIENT
Start: 2023-02-14

## 2023-03-11 RX ORDER — SIMVASTATIN 20 MG
TABLET ORAL
Qty: 90 TABLET | Refills: 0 | Status: SHIPPED | OUTPATIENT
Start: 2023-03-11

## 2023-03-14 ENCOUNTER — TELEPHONE (OUTPATIENT)
Dept: FAMILY MEDICINE CLINIC | Facility: CLINIC | Age: 88
End: 2023-03-14

## 2023-03-14 RX ORDER — SIMVASTATIN 40 MG
40 TABLET ORAL NIGHTLY
Qty: 90 TABLET | Refills: 0 | Status: SHIPPED | OUTPATIENT
Start: 2023-03-14 | End: 2023-03-20 | Stop reason: CLARIF

## 2023-03-14 NOTE — TELEPHONE ENCOUNTER
Received fax from [employer]: SIMVASTATIN 20mg is on back order. 40mg pended and have pt take 1/2 tab? Please approve or deny. Thank you.

## 2023-03-18 ENCOUNTER — MED REC SCAN ONLY (OUTPATIENT)
Dept: FAMILY MEDICINE CLINIC | Facility: CLINIC | Age: 88
End: 2023-03-18

## 2023-03-20 ENCOUNTER — TELEPHONE (OUTPATIENT)
Dept: HEMATOLOGY/ONCOLOGY | Facility: HOSPITAL | Age: 88
End: 2023-03-20

## 2023-03-20 ENCOUNTER — TELEPHONE (OUTPATIENT)
Dept: FAMILY MEDICINE CLINIC | Facility: CLINIC | Age: 88
End: 2023-03-20

## 2023-03-20 RX ORDER — SIMVASTATIN 40 MG
TABLET ORAL
Qty: 45 TABLET | Refills: 0 | Status: SHIPPED | OUTPATIENT
Start: 2023-03-20

## 2023-03-28 ENCOUNTER — OFFICE VISIT (OUTPATIENT)
Dept: HEMATOLOGY/ONCOLOGY | Age: 88
End: 2023-03-28
Attending: INTERNAL MEDICINE
Payer: MEDICARE

## 2023-03-28 VITALS
HEIGHT: 64.02 IN | SYSTOLIC BLOOD PRESSURE: 116 MMHG | WEIGHT: 206.69 LBS | TEMPERATURE: 99 F | DIASTOLIC BLOOD PRESSURE: 65 MMHG | BODY MASS INDEX: 35.29 KG/M2 | RESPIRATION RATE: 18 BRPM | OXYGEN SATURATION: 98 % | HEART RATE: 77 BPM

## 2023-03-28 DIAGNOSIS — N18.30 STAGE 3 CHRONIC KIDNEY DISEASE, UNSPECIFIED WHETHER STAGE 3A OR 3B CKD (HCC): ICD-10-CM

## 2023-03-28 DIAGNOSIS — C34.12 SMALL CELL CARCINOMA OF UPPER LOBE OF LEFT LUNG (HCC): ICD-10-CM

## 2023-03-28 DIAGNOSIS — C34.92 SQUAMOUS CELL CARCINOMA OF LEFT LUNG (HCC): ICD-10-CM

## 2023-03-28 DIAGNOSIS — I82.90 VTE (VENOUS THROMBOEMBOLISM): Primary | ICD-10-CM

## 2023-03-28 DIAGNOSIS — D64.9 NORMOCYTIC NORMOCHROMIC ANEMIA: ICD-10-CM

## 2023-03-28 DIAGNOSIS — Z79.01 CHRONIC ANTICOAGULATION: ICD-10-CM

## 2023-03-28 LAB
ALBUMIN SERPL-MCNC: 3.5 G/DL (ref 3.4–5)
ALBUMIN/GLOB SERPL: 1.2 {RATIO} (ref 1–2)
ALP LIVER SERPL-CCNC: 76 U/L
ALT SERPL-CCNC: 17 U/L
ANION GAP SERPL CALC-SCNC: 9 MMOL/L (ref 0–18)
AST SERPL-CCNC: 18 U/L (ref 15–37)
BASOPHILS # BLD AUTO: 0.08 X10(3) UL (ref 0–0.2)
BASOPHILS NFR BLD AUTO: 1 %
BILIRUB SERPL-MCNC: 0.7 MG/DL (ref 0.1–2)
BUN BLD-MCNC: 25 MG/DL (ref 7–18)
CALCIUM BLD-MCNC: 9.2 MG/DL (ref 8.5–10.1)
CHLORIDE SERPL-SCNC: 109 MMOL/L (ref 98–112)
CO2 SERPL-SCNC: 24 MMOL/L (ref 21–32)
CREAT BLD-MCNC: 1.41 MG/DL
EOSINOPHIL # BLD AUTO: 0.44 X10(3) UL (ref 0–0.7)
EOSINOPHIL NFR BLD AUTO: 5.7 %
ERYTHROCYTE [DISTWIDTH] IN BLOOD BY AUTOMATED COUNT: 15.7 %
FASTING STATUS PATIENT QL REPORTED: NO
GFR SERPLBLD BASED ON 1.73 SQ M-ARVRAT: 37 ML/MIN/1.73M2 (ref 60–?)
GLOBULIN PLAS-MCNC: 3 G/DL (ref 2.8–4.4)
GLUCOSE BLD-MCNC: 82 MG/DL (ref 70–99)
HCT VFR BLD AUTO: 34.6 %
HGB BLD-MCNC: 11.4 G/DL
IMM GRANULOCYTES # BLD AUTO: 0.02 X10(3) UL (ref 0–1)
IMM GRANULOCYTES NFR BLD: 0.3 %
LDH SERPL L TO P-CCNC: 187 U/L
LYMPHOCYTES # BLD AUTO: 2.47 X10(3) UL (ref 1–4)
LYMPHOCYTES NFR BLD AUTO: 31.8 %
MCH RBC QN AUTO: 31.7 PG (ref 26–34)
MCHC RBC AUTO-ENTMCNC: 32.9 G/DL (ref 31–37)
MCV RBC AUTO: 96.1 FL
MONOCYTES # BLD AUTO: 1.04 X10(3) UL (ref 0.1–1)
MONOCYTES NFR BLD AUTO: 13.4 %
NEUTROPHILS # BLD AUTO: 3.72 X10 (3) UL (ref 1.5–7.7)
NEUTROPHILS # BLD AUTO: 3.72 X10(3) UL (ref 1.5–7.7)
NEUTROPHILS NFR BLD AUTO: 47.8 %
OSMOLALITY SERPL CALC.SUM OF ELEC: 297 MOSM/KG (ref 275–295)
PLATELET # BLD AUTO: 177 10(3)UL (ref 150–450)
POTASSIUM SERPL-SCNC: 4.2 MMOL/L (ref 3.5–5.1)
PROT SERPL-MCNC: 6.5 G/DL (ref 6.4–8.2)
RBC # BLD AUTO: 3.6 X10(6)UL
SODIUM SERPL-SCNC: 142 MMOL/L (ref 136–145)
WBC # BLD AUTO: 7.8 X10(3) UL (ref 4–11)

## 2023-03-28 PROCEDURE — 99215 OFFICE O/P EST HI 40 MIN: CPT | Performed by: INTERNAL MEDICINE

## 2023-03-28 NOTE — PROGRESS NOTES
Here with daughter for MD fu visit for h/o lung ca  Was due for FU in november  No cough  No new SOB  No pain.    Labs drawn   Education Record    Learner:  Patient    Disease / Diagnosis:    Barriers / Limitations:  None   Comments:    Method:  Brief focused and Printed material   Comments:    General Topics: Plan of care reviewed   Comments:    Outcome:  Shows understanding   Comments:

## 2023-04-05 ENCOUNTER — TELEPHONE (OUTPATIENT)
Dept: FAMILY MEDICINE CLINIC | Facility: CLINIC | Age: 88
End: 2023-04-05

## 2023-04-05 RX ORDER — ALLOPURINOL 300 MG/1
300 TABLET ORAL DAILY
COMMUNITY
Start: 2023-01-25

## 2023-04-05 RX ORDER — TIOTROPIUM BROMIDE 18 UG/1
1 CAPSULE ORAL; RESPIRATORY (INHALATION) DAILY
COMMUNITY
End: 2023-04-05 | Stop reason: CLARIF

## 2023-04-05 RX ORDER — MELATONIN
1 DAILY
COMMUNITY
Start: 2023-03-21

## 2023-04-05 RX ORDER — OMEPRAZOLE 40 MG/1
CAPSULE, DELAYED RELEASE ORAL
Qty: 90 CAPSULE | Refills: 0 | Status: SHIPPED | OUTPATIENT
Start: 2023-04-05

## 2023-04-05 RX ORDER — SENNOSIDES 8.6 MG
650 CAPSULE ORAL EVERY 8 HOURS PRN
COMMUNITY

## 2023-04-05 RX ORDER — VALSARTAN 80 MG/1
80 TABLET ORAL DAILY
COMMUNITY

## 2023-04-05 RX ORDER — CLOBETASOL PROPIONATE 0.5 MG/G
CREAM TOPICAL
COMMUNITY
Start: 2023-02-02

## 2023-04-05 RX ORDER — ALBUTEROL SULFATE 2.5 MG/3ML
2.5 SOLUTION RESPIRATORY (INHALATION) EVERY 4 HOURS PRN
COMMUNITY
Start: 2023-02-10

## 2023-04-10 ENCOUNTER — OFFICE VISIT (OUTPATIENT)
Dept: FAMILY MEDICINE CLINIC | Facility: CLINIC | Age: 88
End: 2023-04-10
Payer: MEDICARE

## 2023-04-10 VITALS
HEIGHT: 64 IN | OXYGEN SATURATION: 100 % | BODY MASS INDEX: 33.46 KG/M2 | SYSTOLIC BLOOD PRESSURE: 136 MMHG | WEIGHT: 196 LBS | HEART RATE: 63 BPM | DIASTOLIC BLOOD PRESSURE: 60 MMHG | RESPIRATION RATE: 18 BRPM

## 2023-04-10 DIAGNOSIS — E66.9 OBESITY (BMI 30.0-34.9): ICD-10-CM

## 2023-04-10 DIAGNOSIS — K21.9 GASTROESOPHAGEAL REFLUX DISEASE WITHOUT ESOPHAGITIS: ICD-10-CM

## 2023-04-10 DIAGNOSIS — E78.00 PURE HYPERCHOLESTEROLEMIA: ICD-10-CM

## 2023-04-10 DIAGNOSIS — J30.1 SEASONAL ALLERGIC RHINITIS DUE TO POLLEN: ICD-10-CM

## 2023-04-10 DIAGNOSIS — E55.9 VITAMIN D DEFICIENCY: ICD-10-CM

## 2023-04-10 DIAGNOSIS — L40.50 PSORIATIC ARTHRITIS (HCC): ICD-10-CM

## 2023-04-10 DIAGNOSIS — M47.816 LUMBAR SPONDYLOSIS: ICD-10-CM

## 2023-04-10 DIAGNOSIS — Z98.890 HISTORY OF LUMBAR SURGERY: ICD-10-CM

## 2023-04-10 DIAGNOSIS — M54.2 CERVICALGIA: ICD-10-CM

## 2023-04-10 DIAGNOSIS — N18.32 STAGE 3B CHRONIC KIDNEY DISEASE (HCC): Chronic | ICD-10-CM

## 2023-04-10 DIAGNOSIS — J41.0 SIMPLE CHRONIC BRONCHITIS (HCC): ICD-10-CM

## 2023-04-10 DIAGNOSIS — M47.812 FACET ARTHRITIS OF CERVICAL REGION: ICD-10-CM

## 2023-04-10 DIAGNOSIS — E03.9 ACQUIRED HYPOTHYROIDISM: ICD-10-CM

## 2023-04-10 DIAGNOSIS — G62.9 NEUROPATHY: Primary | ICD-10-CM

## 2023-04-10 DIAGNOSIS — M47.22 CERVICAL SPONDYLOSIS WITH RADICULOPATHY: ICD-10-CM

## 2023-04-10 DIAGNOSIS — F33.8 SEASONAL DEPRESSION (HCC): ICD-10-CM

## 2023-04-10 DIAGNOSIS — C34.12 SMALL CELL CARCINOMA OF UPPER LOBE OF LEFT LUNG (HCC): ICD-10-CM

## 2023-04-10 DIAGNOSIS — L40.9 PSORIASIS: ICD-10-CM

## 2023-04-10 DIAGNOSIS — I10 ESSENTIAL HYPERTENSION, BENIGN: ICD-10-CM

## 2023-04-10 NOTE — PATIENT INSTRUCTIONS
Lesly Avendaño D.O. Dr. Mercedes Mcnamara. Rheumatology      [address], [address] [facility]  Phone: [phone]  Fax: [phone]          10 [facility] [address] [place] [address]  Phone: [phone]  Fax: [phone]

## 2023-04-11 ENCOUNTER — HOSPITAL ENCOUNTER (OUTPATIENT)
Dept: CT IMAGING | Age: 88
Discharge: HOME | End: 2023-04-11
Attending: INTERNAL MEDICINE
Payer: MEDICARE

## 2023-04-11 ENCOUNTER — HOSPITAL ENCOUNTER (OUTPATIENT)
Dept: MRI IMAGING | Age: 88
Discharge: HOME | End: 2023-04-11
Attending: INTERNAL MEDICINE
Payer: MEDICARE

## 2023-04-11 DIAGNOSIS — C34.92 SQUAMOUS CELL CARCINOMA OF LEFT LUNG (HCC): ICD-10-CM

## 2023-04-11 DIAGNOSIS — C34.12 SMALL CELL CARCINOMA OF UPPER LOBE OF LEFT LUNG (HCC): ICD-10-CM

## 2023-04-11 PROCEDURE — 70553 MRI BRAIN STEM W/O & W/DYE: CPT | Performed by: INTERNAL MEDICINE

## 2023-04-11 PROCEDURE — 71250 CT THORAX DX C-: CPT | Performed by: INTERNAL MEDICINE

## 2023-04-11 PROCEDURE — A9575 INJ GADOTERATE MEGLUMI 0.1ML: HCPCS | Performed by: INTERNAL MEDICINE

## 2023-04-11 RX ORDER — GADOTERATE MEGLUMINE 376.9 MG/ML
30 INJECTION INTRAVENOUS
Status: COMPLETED | OUTPATIENT
Start: 2023-04-11 | End: 2023-04-11

## 2023-04-11 RX ADMIN — GADOTERATE MEGLUMINE 28 ML: 376.9 INJECTION INTRAVENOUS at 13:06:00

## 2023-04-12 ENCOUNTER — TELEPHONE (OUTPATIENT)
Dept: HEMATOLOGY/ONCOLOGY | Facility: HOSPITAL | Age: 88
End: 2023-04-12

## 2023-04-12 PROBLEM — I26.92 ACUTE SADDLE PULMONARY EMBOLISM WITHOUT ACUTE COR PULMONALE (HCC): Status: RESOLVED | Noted: 2021-03-11 | Resolved: 2023-04-12

## 2023-04-12 PROBLEM — M10.042 ACUTE IDIOPATHIC GOUT OF LEFT HAND: Status: RESOLVED | Noted: 2022-05-13 | Resolved: 2023-04-12

## 2023-04-12 PROBLEM — I82.442 ACUTE DEEP VEIN THROMBOSIS (DVT) OF LEFT TIBIAL VEIN (HCC): Status: RESOLVED | Noted: 2021-03-11 | Resolved: 2023-04-12

## 2023-04-12 PROBLEM — I82.451 ACUTE DEEP VEIN THROMBOSIS (DVT) OF RIGHT PERONEAL VEIN (HCC): Status: RESOLVED | Noted: 2021-03-11 | Resolved: 2023-04-12

## 2023-04-14 RX ORDER — FOLIC ACID 1 MG/1
TABLET ORAL
Qty: 90 TABLET | Refills: 0 | Status: SHIPPED | OUTPATIENT
Start: 2023-04-14

## 2023-04-26 ENCOUNTER — TELEPHONE (OUTPATIENT)
Dept: PHYSICAL THERAPY | Facility: HOSPITAL | Age: 88
End: 2023-04-26

## 2023-05-01 ENCOUNTER — OFFICE VISIT (OUTPATIENT)
Dept: PHYSICAL THERAPY | Age: 88
End: 2023-05-01
Attending: FAMILY MEDICINE
Payer: MEDICARE

## 2023-05-01 DIAGNOSIS — M54.2 CERVICALGIA: ICD-10-CM

## 2023-05-01 PROCEDURE — 97162 PT EVAL MOD COMPLEX 30 MIN: CPT | Performed by: PHYSICAL THERAPIST

## 2023-05-01 PROCEDURE — 97110 THERAPEUTIC EXERCISES: CPT | Performed by: PHYSICAL THERAPIST

## 2023-05-09 ENCOUNTER — OFFICE VISIT (OUTPATIENT)
Dept: PHYSICAL THERAPY | Age: 88
End: 2023-05-09
Attending: FAMILY MEDICINE
Payer: MEDICARE

## 2023-05-09 PROCEDURE — 97140 MANUAL THERAPY 1/> REGIONS: CPT | Performed by: PHYSICAL THERAPIST

## 2023-05-09 PROCEDURE — 97110 THERAPEUTIC EXERCISES: CPT | Performed by: PHYSICAL THERAPIST

## 2023-05-09 NOTE — PROGRESS NOTES
Dx: Cervicalgia (M54.2)             Authorized # of Visits:  8  Fall Risk: standard         Precautions: n/a             Subjective:   Patient states she slept in a chair as she could not get comfortable   Current Pain Ratin/10 prior to therapy 7/10 post treatment  Objective:   Patient presented ambulating with cane right UE. Difficulty with right hip flexion with transfer to nustep -indicates right knee pain   Left UE symptoms extend to left hand. Symptoms decreased with postural correction and with supine right SB. Treatment session included postural eduction. There is reduction in left UE symptoms with postural correction. Symptoms moved proximal to mid arm post treatment session      Assessment:   Treatment interventions reduced left UE symptoms. Patient appeared to have better understanding of posture and impact on pain     Plan:   PT 2x/wk progressive exercise, manual therapy    Date: 2023  Tx#:  Date: Tx#: 3/ Date: Tx#: 4/ Date: Tx#: 5/ Date: Tx#: 6/ Date: Tx#: 7/ Date: Tx#: 8/   TherEx TherEx TherEx TherEx TherEx TherEx TherEx   Nustep L5 6 minutes          Seated thoracic rotation in sitting with isometric hip adduction 15 reps each,  Supine pec stretch 4 reps x 20 seconds          Postural education          Manual therapy manual distraction with support of cervical spine, right SB positioning - reduced left UE symptoms                                                           Charges:  TherEx 2, manual therapy       Total Timed Treatment: 45 min  Total Treatment Time: 45 min

## 2023-05-10 ENCOUNTER — TELEPHONE (OUTPATIENT)
Dept: PHYSICAL THERAPY | Facility: HOSPITAL | Age: 88
End: 2023-05-10

## 2023-05-11 ENCOUNTER — APPOINTMENT (OUTPATIENT)
Dept: PHYSICAL THERAPY | Age: 88
End: 2023-05-11
Attending: FAMILY MEDICINE
Payer: MEDICARE

## 2023-05-15 ENCOUNTER — OFFICE VISIT (OUTPATIENT)
Dept: PHYSICAL THERAPY | Age: 88
End: 2023-05-15
Attending: FAMILY MEDICINE
Payer: MEDICARE

## 2023-05-15 PROCEDURE — 97110 THERAPEUTIC EXERCISES: CPT | Performed by: PHYSICAL THERAPIST

## 2023-05-15 PROCEDURE — 97140 MANUAL THERAPY 1/> REGIONS: CPT | Performed by: PHYSICAL THERAPIST

## 2023-05-15 NOTE — PROGRESS NOTES
Dx: Cervicalgia (M54.2)             Authorized # of Visits:  8  Fall Risk: standard         Precautions: n/a             Subjective:   Pain left side of neck and arm   Current Pain Ratin-6/10    Objective:   HEP addition as noted  Left EU symptoms are decreased with support at cervical lordosis  Severe loss of cervical ROM all motions      Assessment:   Despite severe loss of cervical ROM, patient able to perform AROM in supine without increasing left UE symptoms. Symptoms did move more proximal during the session     Plan:   PT 2x/wk progressive exercise, manual therapy    Date: 2023  Tx#:  Date: 5/15/2023  Tx#: 3/8 Date: Tx#: 4/ Date: Tx#: 5/ Date: Tx#: 6/ Date: Tx#: 7/ Date: Tx#: 8/   TherEx TherEx TherEx TherEx TherEx TherEx TherEx   Nustep L5 6 minutes  Nustep L3 6 minutes         Seated thoracic rotation in sitting with isometric hip adduction 15 reps each,  Supine pec stretch 4 reps x 20 seconds  Standing AAROM right and left shoulder horizontal abduction with cervical rotation same side        Postural education  Horizontal abduction yellow band 10 reps sitting        Manual therapy manual distraction with support of cervical spine, right SB positioning - reduced left UE symptoms  Supine horizontal abduction yellow band 10 reps x 2 supine (HEP)         Thoracic mobility transverse plane standing 10 reps each with bilat UE           Standing assisted shoulder flexion 10 reps each - right = left side scap plane          Supine active cervical rotation left 10 reps         Supine PROM with manual gentle over pressure  (Manual therapy 15 minutes)                     Charges:  TherEx 2, manual therapy       Total Timed Treatment: 45 min  Total Treatment Time: 45 min

## 2023-05-17 ENCOUNTER — OFFICE VISIT (OUTPATIENT)
Dept: PHYSICAL THERAPY | Age: 88
End: 2023-05-17
Attending: FAMILY MEDICINE
Payer: MEDICARE

## 2023-05-17 PROCEDURE — 97140 MANUAL THERAPY 1/> REGIONS: CPT | Performed by: PHYSICAL THERAPIST

## 2023-05-17 PROCEDURE — 97110 THERAPEUTIC EXERCISES: CPT | Performed by: PHYSICAL THERAPIST

## 2023-05-17 NOTE — PROGRESS NOTES
Dx: Cervicalgia (M54.2)             Authorized # of Visits:  8  Fall Risk: standard         Precautions: n/a             Subjective:   Pain mostly in right knee and back today. Left arm pain does not go beyond her elbow   Current Pain Ratin/10, after treatment 6/10     Objective:   Treatment session included additional patient education on the importance of movement for assisting with joint health  HEP additions as noted. Right leg load - right knee pain. Lumbar pain decreased with cues for lumbar stability       Assessment:   Lumbar pain improved after exercise. Cervical ROM remains severely limited. Support of cervical lordosis decreases pain     Plan:   PT 2x/wk progressive exercise, manual therapy    Date: 2023  Tx#:  Date: 5/15/2023  Tx#: 3/8 Date: 2023  Tx#:  Date: Tx#: 5/ Date: Tx#: 6/ Date: Tx#: / Date:    Tx#: 8/   TherEx TherEx TherEx TherEx TherEx TherEx TherEx   Nustep L5 6 minutes  Nustep L3 6 minutes  Nustep L2 3 minutes        Seated thoracic rotation in sitting with isometric hip adduction 15 reps each,  Supine pec stretch 4 reps x 20 seconds  Standing AAROM right and left shoulder horizontal abduction with cervical rotation same side LTR > 20 reps (HEP)       Postural education  Horizontal abduction yellow band 10 reps sitting Bent knee fallouts (HEP)   15 reps x 2        Manual therapy manual distraction with support of cervical spine, right SB positioning - reduced left UE symptoms  Supine horizontal abduction yellow band 10 reps x 2 supine (HEP) Leg load 10 reps (HEP)         Thoracic mobility transverse plane standing 10 reps each with bilat UE   Horizontal abduction with yellow band in supine 10 reps x 2         Standing assisted shoulder flexion 10 reps each - right = left side scap plane  Supine PROM with manual gentle over pressure  (Manual therapy 15 minutes)        Supine active cervical rotation left 10 reps         Supine PROM with manual gentle over pressure  (Manual therapy 15 minutes)                     Charges:  TherEx 2, manual therapy       Total Timed Treatment: 40 min  Total Treatment Time: 40 min

## 2023-05-22 ENCOUNTER — OFFICE VISIT (OUTPATIENT)
Dept: PHYSICAL THERAPY | Age: 88
End: 2023-05-22
Attending: FAMILY MEDICINE
Payer: MEDICARE

## 2023-05-22 PROCEDURE — 97110 THERAPEUTIC EXERCISES: CPT | Performed by: PHYSICAL THERAPIST

## 2023-05-22 PROCEDURE — 97140 MANUAL THERAPY 1/> REGIONS: CPT | Performed by: PHYSICAL THERAPIST

## 2023-05-22 NOTE — PROGRESS NOTES
Dx: Cervicalgia (M54.2)             Authorized # of Visits:  8  Fall Risk: standard         Precautions: n/a             Subjective:   Patient states she is not hold off on performing exercise on the nustep as it is increasing her right knee/hip pain. She states she also had some injections in her neck. She is doing better but still has difficulty with standing to iron her clothes. She has back and right leg in addition to her neck pain. She is being evaluated for a spine stimulator to help with her pain   Current Pain Ratin/10    Objective:   Cervical ROM severely limited for all motions. Cervical extension consists of patient essentially lifting her eyes up. There is reduction in left arm pain with towel support for cervical extension  Right SLR - decreased spinal control/decreased hip flexion strength   Provided patient education on posture with ironing/standing      Assessment:   Cervical pain is improved and there has been centralization of symptoms. Patient was able to appreciate impact of head position in her pain. Multiple joint involvement will limit her ability to progress with exercise. Plan:   PT 2x/wk progressive exercise, manual therapy    Date: 2023  Tx#:  Date: 5/15/2023  Tx#: 3/8 Date: 2023  Tx#:  Date: 2023  Tx#:  Date: Tx#: 6/ Date: Tx#: 7/ Date:    Tx#: 8/   TherEx TherEx TherEx TherEx TherEx TherEx TherEx   Nustep L5 6 minutes  Nustep L3 6 minutes  Nustep L2 3 minutes  UBE 5 minutes       Seated thoracic rotation in sitting with isometric hip adduction 15 reps each,  Supine pec stretch 4 reps x 20 seconds  Standing AAROM right and left shoulder horizontal abduction with cervical rotation same side LTR > 20 reps (HEP) Seated cervical extension with towel support - decreased left UT pain       Postural education  Horizontal abduction yellow band 10 reps sitting Bent knee fallouts (HEP)   15 reps x 2  Cervical isometrics - no pain       Manual therapy manual distraction with support of cervical spine, right SB positioning - reduced left UE symptoms  Supine horizontal abduction yellow band 10 reps x 2 supine (HEP) Leg load 10 reps (HEP)  LTR - mild right side lumbar pain        Thoracic mobility transverse plane standing 10 reps each with bilat UE   Horizontal abduction with yellow band in supine 10 reps x 2  Seated row yellow band 10 reps x 3        Standing assisted shoulder flexion 10 reps each - right = left side scap plane  Supine PROM with manual gentle over pressure  (Manual therapy 15 minutes) Supine horizontal abduction yellow band 10 reps x 2        Supine active cervical rotation left 10 reps  Supine PROM with manual gentle over pressure  (Manual therapy 15 minutes)       Supine PROM with manual gentle over pressure  (Manual therapy 15 minutes)                     Charges:  TherEx 2, manual therapy       Total Timed Treatment: 40 min  Total Treatment Time: 40 min

## 2023-05-31 ENCOUNTER — OFFICE VISIT (OUTPATIENT)
Dept: PHYSICAL THERAPY | Age: 88
End: 2023-05-31
Attending: FAMILY MEDICINE
Payer: MEDICARE

## 2023-05-31 PROCEDURE — 97110 THERAPEUTIC EXERCISES: CPT | Performed by: PHYSICAL THERAPIST

## 2023-05-31 NOTE — PROGRESS NOTES
Dx: Cervicalgia (M54.2)             Authorized # of Visits:  8  Fall Risk: standard         Precautions: n/a             Subjective:   Neck pain is better. There is no numbness in her left hand   Current Pain Ratin/10 back pain    Objective:   Severe decrease cervical rotation, left rotation decreased > right. Left side neck pain. Slumped sitting - mild left upper arm pain   Leg load - right LE limited mobility - bracing through UES increased ease of movement       Assessment:   Cervical ROM remains severely limited. Although ROM limitations are present, pain has improved. Plan:   PT one more visit    Date: 2023  Tx#:  Date: 5/15/2023  Tx#: 3/8 Date: 2023  Tx#:  Date: 2023  Tx#:  Date: 2023  Tx#:  Date: Tx#:  Date:    Tx#: 8/   TherEx TherEx TherEx TherEx TherEx TherEx TherEx   Nustep L5 6 minutes  Nustep L3 6 minutes  Nustep L2 3 minutes  UBE 5 minutes  UBE 6 minutes     Seated thoracic rotation in sitting with isometric hip adduction 15 reps each,  Supine pec stretch 4 reps x 20 seconds  Standing AAROM right and left shoulder horizontal abduction with cervical rotation same side LTR > 20 reps (HEP) Seated cervical extension with towel support - decreased left UT pain  1.5 minutes standing with UE drivers in all planes      Postural education  Horizontal abduction yellow band 10 reps sitting Bent knee fallouts (HEP)   15 reps x 2  Cervical isometrics - no pain  Seated UE  all planes 10 reps each     Manual therapy manual distraction with support of cervical spine, right SB positioning - reduced left UE symptoms  Supine horizontal abduction yellow band 10 reps x 2 supine (HEP) Leg load 10 reps (HEP)  LTR - mild right side lumbar pain  Hip flexion seated 10 reps       Thoracic mobility transverse plane standing 10 reps each with bilat UE   Horizontal abduction with yellow band in supine 10 reps x 2  Seated row yellow band 10 reps x 3  Bent knee fallouts 10 reps each      Standing assisted shoulder flexion 10 reps each - right = left side scap plane  Supine PROM with manual gentle over pressure  (Manual therapy 15 minutes) Supine horizontal abduction yellow band 10 reps x 2  Leg load 10 reps with UE bracing       Supine active cervical rotation left 10 reps  Supine PROM with manual gentle over pressure  (Manual therapy 15 minutes) Bridge < 10 reps hamstring cramps       Supine PROM with manual gentle over pressure  (Manual therapy 15 minutes)   Cervical PROM in supine                    Charges:  TherEx 3       Total Timed Treatment: 45 min  Total Treatment Time: 45 min

## 2023-06-06 ENCOUNTER — TELEPHONE (OUTPATIENT)
Dept: PHYSICAL THERAPY | Facility: HOSPITAL | Age: 88
End: 2023-06-06

## 2023-07-13 RX ORDER — OMEPRAZOLE 40 MG/1
CAPSULE, DELAYED RELEASE ORAL
Qty: 90 CAPSULE | Refills: 0 | Status: SHIPPED | OUTPATIENT
Start: 2023-07-13

## 2023-07-25 DIAGNOSIS — M17.11 PRIMARY OSTEOARTHRITIS OF RIGHT KNEE: ICD-10-CM

## 2023-07-25 RX ORDER — METOPROLOL SUCCINATE 50 MG/1
50 TABLET, EXTENDED RELEASE ORAL DAILY
Qty: 90 TABLET | Refills: 1 | Status: SHIPPED | OUTPATIENT
Start: 2023-07-25

## 2023-07-25 RX ORDER — FOLIC ACID 1 MG/1
TABLET ORAL
Qty: 90 TABLET | Refills: 0 | Status: SHIPPED | OUTPATIENT
Start: 2023-07-25

## 2023-07-25 NOTE — TELEPHONE ENCOUNTER
Medication(s) to Refill:   Requested Prescriptions     Pending Prescriptions Disp Refills    FOLIC ACID 1 MG Oral Tab [Pharmacy Med Name: FOLIC ACID 1MG TABLETS] 90 tablet 0     Sig: TAKE 1 TABLET BY MOUTH EVERY DAY    METOPROLOL SUCCINATE ER 50 MG Oral Tablet 24 Hr [Pharmacy Med Name: METOPROLOL ER SUCCINATE 50MG TABS] 90 tablet 1     Sig: TAKE 1 TABLET(50 MG) BY MOUTH DAILY         Reason for Medication Refill being sent to Provider / Reason Protocol Failed:  [] 90 day refill has already been granted  [] Blood Pressure out of range  [] Labs Abnormal/over due  [] Medication not previously prescribed by Provider  [x] Non-Protocol Medication  [] Controlled Substance   [] Due for appointment- no future appointment scheduled  [] No Follow up specified      Last Time Medication was Filled:    4/14/2023    Last Office Visit with PCP: 11/1/2022    When Patient was Due Back to the Office:  not on file   (from when PCP last addressed condition)    Future Appointments:  No future appointments.       Last Blood Pressures:  BP Readings from Last 2 Encounters:  04/10/23 : 136/60  03/28/23 : 116/65          Action taken:  [] Refill approved per protocol  [x] Routing to provider for approval

## 2023-07-26 ENCOUNTER — SOCIAL WORK SERVICES (OUTPATIENT)
Dept: HEMATOLOGY/ONCOLOGY | Facility: HOSPITAL | Age: 88
End: 2023-07-26

## 2023-07-26 NOTE — PROGRESS NOTES
SW assist with FMLA paperwork for pt's daughter, Eda Miguel. FMLA paperwork for intermittent leave was faxed to [facility]- [employer] fax [phone]. SW updated pt's daughter, Yony Pringle [phone] that paperwork was completed and faxed per her request.    Ralph Gambino, LCSW   at [address], [address] [place] [address], [place], [address] [facility]  [facility] Vinicio Frey [address] [place] [address] Vinicio Smiley  Ph: [phone]. [email or web address]  Fax: [phone]

## 2023-07-28 ENCOUNTER — TELEPHONE (OUTPATIENT)
Dept: HEMATOLOGY/ONCOLOGY | Facility: HOSPITAL | Age: 88
End: 2023-07-28

## 2023-07-28 DIAGNOSIS — C34.92 SQUAMOUS CELL CARCINOMA OF LEFT LUNG (HCC): Primary | ICD-10-CM

## 2023-07-28 DIAGNOSIS — C34.12 SMALL CELL CARCINOMA OF UPPER LOBE OF LEFT LUNG (HCC): ICD-10-CM

## 2023-07-28 NOTE — TELEPHONE ENCOUNTER
Pt is calling to request a new order for MRI of the brain and CT of Thorax.  Pt did not get them done in march and auth has  and closed-NL

## 2023-08-14 ENCOUNTER — HOSPITAL ENCOUNTER (OUTPATIENT)
Dept: CT IMAGING | Age: 88
Discharge: HOME | End: 2023-08-14
Attending: INTERNAL MEDICINE
Payer: MEDICARE

## 2023-08-14 DIAGNOSIS — C34.92 SQUAMOUS CELL CARCINOMA OF LEFT LUNG (HCC): ICD-10-CM

## 2023-08-14 DIAGNOSIS — C34.12 SMALL CELL CARCINOMA OF UPPER LOBE OF LEFT LUNG (HCC): ICD-10-CM

## 2023-08-14 LAB
CREAT BLD-MCNC: 1.4 MG/DL
EGFRCR SERPLBLD CKD-EPI 2021: 37 ML/MIN/1.73M2 (ref 60–?)

## 2023-08-14 PROCEDURE — 82565 ASSAY OF CREATININE: CPT

## 2023-08-14 PROCEDURE — 71260 CT THORAX DX C+: CPT | Performed by: INTERNAL MEDICINE

## 2023-08-15 ENCOUNTER — TELEPHONE (OUTPATIENT)
Dept: HEMATOLOGY/ONCOLOGY | Facility: HOSPITAL | Age: 88
End: 2023-08-15

## 2023-08-15 NOTE — TELEPHONE ENCOUNTER
Pt called. NA. LM re: CT results good. LMTCB and speak with triage RN for additional details. Per Dr. Lizette Townsend:   Please let her know CT is good. There is a tiny nonspecific  nodule which we will monitor with scans.  Thanks

## 2023-09-05 RX ORDER — SIMVASTATIN 20 MG
20 TABLET ORAL EVERY EVENING
Qty: 45 TABLET | Refills: 0 | Status: SHIPPED | OUTPATIENT
Start: 2023-09-05

## 2023-09-05 RX ORDER — SIMVASTATIN 20 MG
20 TABLET ORAL EVERY EVENING
Qty: 90 TABLET | Refills: 0 | OUTPATIENT
Start: 2023-09-05

## 2023-09-06 ENCOUNTER — OFFICE VISIT (OUTPATIENT)
Dept: FAMILY MEDICINE CLINIC | Facility: CLINIC | Age: 88
End: 2023-09-06
Payer: MEDICARE

## 2023-09-06 VITALS
OXYGEN SATURATION: 99 % | HEIGHT: 65 IN | HEART RATE: 54 BPM | WEIGHT: 200 LBS | TEMPERATURE: 97 F | RESPIRATION RATE: 18 BRPM | DIASTOLIC BLOOD PRESSURE: 41 MMHG | SYSTOLIC BLOOD PRESSURE: 139 MMHG | BODY MASS INDEX: 33.32 KG/M2

## 2023-09-06 DIAGNOSIS — S61.210A LACERATION OF RIGHT INDEX FINGER WITHOUT FOREIGN BODY WITHOUT DAMAGE TO NAIL, INITIAL ENCOUNTER: Primary | ICD-10-CM

## 2023-09-06 DIAGNOSIS — E03.9 ACQUIRED HYPOTHYROIDISM: ICD-10-CM

## 2023-09-06 PROCEDURE — 1159F MED LIST DOCD IN RCRD: CPT | Performed by: NURSE PRACTITIONER

## 2023-09-06 PROCEDURE — 1160F RVW MEDS BY RX/DR IN RCRD: CPT | Performed by: NURSE PRACTITIONER

## 2023-09-06 PROCEDURE — 3075F SYST BP GE 130 - 139MM HG: CPT | Performed by: NURSE PRACTITIONER

## 2023-09-06 PROCEDURE — 99213 OFFICE O/P EST LOW 20 MIN: CPT | Performed by: NURSE PRACTITIONER

## 2023-09-06 PROCEDURE — 3078F DIAST BP <80 MM HG: CPT | Performed by: NURSE PRACTITIONER

## 2023-09-06 PROCEDURE — 3008F BODY MASS INDEX DOCD: CPT | Performed by: NURSE PRACTITIONER

## 2023-09-06 RX ORDER — LEVOTHYROXINE SODIUM 50 MCG
50 TABLET ORAL DAILY
Qty: 90 TABLET | Refills: 1 | Status: SHIPPED | OUTPATIENT
Start: 2023-09-06

## 2023-09-13 DIAGNOSIS — F33.8 SEASONAL DEPRESSION (HCC): ICD-10-CM

## 2023-09-13 RX ORDER — FLUOXETINE HYDROCHLORIDE 20 MG/1
20 CAPSULE ORAL DAILY
Qty: 90 CAPSULE | Refills: 1 | Status: SHIPPED | OUTPATIENT
Start: 2023-09-13

## 2023-10-02 ENCOUNTER — TELEPHONE (OUTPATIENT)
Dept: HEMATOLOGY/ONCOLOGY | Facility: HOSPITAL | Age: 88
End: 2023-10-02

## 2023-10-02 ENCOUNTER — HOSPITAL ENCOUNTER (OUTPATIENT)
Dept: MRI IMAGING | Age: 88
Discharge: HOME | End: 2023-10-02
Attending: INTERNAL MEDICINE
Payer: MEDICARE

## 2023-10-02 DIAGNOSIS — C34.12 SMALL CELL CARCINOMA OF UPPER LOBE OF LEFT LUNG (HCC): ICD-10-CM

## 2023-10-02 DIAGNOSIS — C34.92 SQUAMOUS CELL CARCINOMA OF LEFT LUNG (HCC): ICD-10-CM

## 2023-10-02 PROCEDURE — 70553 MRI BRAIN STEM W/O & W/DYE: CPT | Performed by: INTERNAL MEDICINE

## 2023-10-02 PROCEDURE — A9575 INJ GADOTERATE MEGLUMI 0.1ML: HCPCS | Performed by: INTERNAL MEDICINE

## 2023-10-02 RX ORDER — GADOTERATE MEGLUMINE 376.9 MG/ML
30 INJECTION INTRAVENOUS
Status: COMPLETED | OUTPATIENT
Start: 2023-10-02 | End: 2023-10-02

## 2023-10-02 RX ADMIN — GADOTERATE MEGLUMINE 30 ML: 376.9 INJECTION INTRAVENOUS at 12:25:00

## 2023-10-05 RX ORDER — APIXABAN 5 MG/1
TABLET, FILM COATED ORAL
Qty: 60 TABLET | Refills: 0 | Status: SHIPPED | OUTPATIENT
Start: 2023-10-05

## 2023-10-09 RX ORDER — OMEPRAZOLE 40 MG/1
40 CAPSULE, DELAYED RELEASE ORAL DAILY
Qty: 90 CAPSULE | Refills: 0 | OUTPATIENT
Start: 2023-10-09

## 2023-10-09 RX ORDER — POTASSIUM CHLORIDE 20 MEQ/1
20 TABLET, EXTENDED RELEASE ORAL DAILY
Qty: 90 TABLET | Refills: 0 | OUTPATIENT
Start: 2023-10-09

## 2023-10-12 ENCOUNTER — TELEPHONE (OUTPATIENT)
Dept: HEMATOLOGY/ONCOLOGY | Age: 88
End: 2023-10-12

## 2023-10-12 RX ORDER — POTASSIUM CHLORIDE 20 MEQ/1
20 TABLET, EXTENDED RELEASE ORAL DAILY
Qty: 90 TABLET | Refills: 1 | Status: SHIPPED | OUTPATIENT
Start: 2023-10-12

## 2023-10-12 RX ORDER — OMEPRAZOLE 40 MG/1
40 CAPSULE, DELAYED RELEASE ORAL DAILY
Qty: 90 CAPSULE | Refills: 1 | Status: SHIPPED | OUTPATIENT
Start: 2023-10-12

## 2023-10-20 RX ORDER — SIMVASTATIN 20 MG
20 TABLET ORAL EVERY EVENING
Qty: 45 TABLET | Refills: 0 | Status: SHIPPED | OUTPATIENT
Start: 2023-10-20

## 2023-10-20 RX ORDER — SIMVASTATIN 20 MG
20 TABLET ORAL EVERY EVENING
Qty: 90 TABLET | Refills: 0 | OUTPATIENT
Start: 2023-10-20

## 2023-10-20 NOTE — TELEPHONE ENCOUNTER
Medication(s) to Refill:   Requested Prescriptions     Pending Prescriptions Disp Refills    SIMVASTATIN 20 MG Oral Tab [Pharmacy Med Name: SIMVASTATIN 20MG TABLETS] 45 tablet 0     Sig: TAKE 1 TABLET(20 MG) BY MOUTH EVERY EVENING         Reason for Medication Refill being sent to Provider / Reason Protocol Failed:  [] 90 day refill has already been granted  [] Blood Pressure out of range  [] Labs Abnormal/over due  [] Medication not previously prescribed by Provider  [] Non-Protocol Medication  [] Controlled Substance   [x] Due for appointment- no future appointment scheduled  [] No Follow up specified      Last Time Medication was Filled:  9/5/2023      Last Office Visit with PCP: 4/10/2023    When Patient was Due Back to the Office:  2 months  (from when PCP last addressed condition)    Future Appointments:  No future appointments.       Last Blood Pressures:  BP Readings from Last 2 Encounters:  09/06/23 : 139/41  04/10/23 : 136/60      Action taken:  [] Refill approved per protocol  [x] Routing to provider for approval

## 2023-11-15 RX ORDER — FOLIC ACID 1 MG/1
TABLET ORAL
Qty: 90 TABLET | Refills: 0 | Status: SHIPPED | OUTPATIENT
Start: 2023-11-15 | End: 2024-02-13

## 2023-11-15 NOTE — TELEPHONE ENCOUNTER
Medication(s) to Refill:   Requested Prescriptions     Pending Prescriptions Disp Refills    FOLIC ACID 1 MG Oral Tab [Pharmacy Med Name: FOLIC ACID 1MG TABLETS] 90 tablet 0     Sig: TAKE 1 TABLET BY MOUTH EVERY DAY         Reason for Medication Refill being sent to Provider / Reason Protocol Failed:  [] 90 day refill has already been granted  [] Blood Pressure out of range  [] Labs Abnormal/over due  [] Medication not previously prescribed by Provider  [x] Non-Protocol Medication  [] Controlled Substance   [] Due for appointment- no future appointment scheduled  [] No Follow up specified      Last Time Medication was Filled:  7/25/2023      Last Office Visit with PCP: 4/10/2023    When Patient was Due Back to the Office:  2 months   (from when PCP last addressed condition)    Future Appointments:  No future appointments.      Last Blood Pressures:  BP Readings from Last 2 Encounters:   09/06/23 139/41   04/10/23 136/60       Action taken:  [] Refill approved per protocol  [x] Routing to provider for approval

## 2023-11-16 RX ORDER — FUROSEMIDE 20 MG/1
20 TABLET ORAL DAILY
Qty: 90 TABLET | Refills: 0 | Status: SHIPPED | OUTPATIENT
Start: 2023-11-16

## 2023-12-09 RX ORDER — SIMVASTATIN 20 MG
20 TABLET ORAL EVERY EVENING
Qty: 45 TABLET | Refills: 0 | Status: SHIPPED | OUTPATIENT
Start: 2023-12-09 | End: 2023-12-11

## 2023-12-09 NOTE — TELEPHONE ENCOUNTER
Medication(s) to Refill:   Requested Prescriptions     Pending Prescriptions Disp Refills    SIMVASTATIN 20 MG Oral Tab [Pharmacy Med Name: SIMVASTATIN 20MG TABLETS] 45 tablet 0     Sig: TAKE 1 TABLET(20 MG) BY MOUTH EVERY EVENING         Reason for Medication Refill being sent to Provider / Reason Protocol Failed:  [] 90 day refill has already been granted  [] Blood Pressure out of range  [] Labs Abnormal/over due  [] Medication not previously prescribed by Provider  [] Non-Protocol Medication  [] Controlled Substance   [] Due for appointment- no future appointment scheduled  [] No Follow up specified      Last Time Medication was Filled:  10/20/2023      Last Office Visit with PCP: 4/10/2023    When Patient was Due Back to the Office:  not on file   (from when PCP last addressed condition)    Future Appointments:  No future appointments.       Last Blood Pressures:  BP Readings from Last 2 Encounters:   09/06/23 139/41   04/10/23 136/60         Action taken:  [] Refill approved per protocol  [x] Routing to provider for approval

## 2023-12-11 RX ORDER — SIMVASTATIN 20 MG
20 TABLET ORAL EVERY EVENING
Qty: 90 TABLET | Refills: 0 | Status: SHIPPED | OUTPATIENT
Start: 2023-12-11

## 2023-12-27 ENCOUNTER — SOCIAL WORK SERVICES (OUTPATIENT)
Dept: HEMATOLOGY/ONCOLOGY | Facility: HOSPITAL | Age: 88
End: 2023-12-27

## 2023-12-27 NOTE — PROGRESS NOTES
CARMEN assist with [place] paperwork for pt's daughter, Roque Helton. [place] for [place] leave was faxed to [facility]- [employer] fax [phone]. SW updated pt's daughter, Esteban Stanton [phone] that paperwork was completed and faxed. Marine Foy LCSW   at [facility]    [address] [facility], [address] [place] [address], [place], [address] [place], [place] [address]jordy Echevarria [address] [place] [address] Vinicio Smiley  Ph: [phone]. [email or web address]  Fax: [phone]

## 2024-01-02 RX ORDER — APIXABAN 5 MG/1
5 TABLET, FILM COATED ORAL 2 TIMES DAILY
Qty: 60 TABLET | Refills: 0 | Status: SHIPPED | OUTPATIENT
Start: 2024-01-02

## 2024-01-31 ENCOUNTER — TELEPHONE (OUTPATIENT)
Dept: HEMATOLOGY/ONCOLOGY | Facility: HOSPITAL | Age: 89
End: 2024-01-31

## 2024-02-02 DIAGNOSIS — M17.11 PRIMARY OSTEOARTHRITIS OF RIGHT KNEE: ICD-10-CM

## 2024-02-07 ENCOUNTER — TELEPHONE (OUTPATIENT)
Dept: HEMATOLOGY/ONCOLOGY | Age: 89
End: 2024-02-07

## 2024-02-07 NOTE — TELEPHONE ENCOUNTER
Medication(s) to Refill:   Requested Prescriptions     Pending Prescriptions Disp Refills    METOPROLOL SUCCINATE ER 50 MG Oral Tablet 24 Hr [Pharmacy Med Name: METOPROLOL ER SUCCINATE 50MG TABS] 90 tablet 1     Sig: TAKE 1 TABLET(50 MG) BY MOUTH DAILY         Reason for Medication Refill being sent to Provider / Reason Protocol Failed:  [] 90 day refill has already been granted  [] Blood Pressure out of range  [] Labs Abnormal/over due  [] Medication not previously prescribed by Provider  [] Non-Protocol Medication  [] Controlled Substance   [x] Due for appointment- no future appointment scheduled  [] No Follow up specified    Last Time Medication was Filled: 7/25/23     Last Office Visit with PCP: 4/10/23    When Patient was Due Back to the Office:   Future Appointments:  Future Appointments   Date Time Provider Department Center   2/13/2024  2:30 PM Yanira Rousseau MD PF HEM ONC [place]       Last Blood Pressures:  BP Readings from Last 2 Encounters:   09/06/23 139/41   04/10/23 136/60     Action taken:  [] Refill approved per protocol  [x] Routing to provider for approval

## 2024-02-07 NOTE — TELEPHONE ENCOUNTER
Gillian [phone] I need a  letter for clearance fax ( [phone] ) before my procedure  to my dentist for a procedure on 2/13/24. I also need to know when to come off my Eliquis and when to start taking it again after the procedure. Thanks Amparo

## 2024-02-08 RX ORDER — METOPROLOL SUCCINATE 50 MG/1
50 TABLET, EXTENDED RELEASE ORAL DAILY
Qty: 90 TABLET | Refills: 1 | Status: SHIPPED | OUTPATIENT
Start: 2024-02-08

## 2024-02-12 NOTE — TELEPHONE ENCOUNTER
Medication(s) to Refill:   Requested Prescriptions     Pending Prescriptions Disp Refills   • SYNTHROID 48 MCG Oral Tab [Pharmacy Med Name: SYNTHROID 0.05MG (50MCG)TABLETS] 90 tablet 0     Sig: TAKE 1 TABLET BY MOUTH EVERY DAY         Reason for Medication Received patient at beginning of shift. Patient visible in milieu and has been attending groups throughout the day. Patient presents with flat affect and depressed mood. Patient states \"I slept as much as I could\" last night. States she is feeling \"upset about the last few days.\" Patient referring to yesterday when they attempted to place an IV on her several times but were unsuccessful. States she has bruises on her arm. States she has been trying to drink more water. Patient did not eat breakfast, but was given water, Gatorade, and saltine crackers. Per Dr. Rangel, patient is allowed to eat meals in the hallway/group room instead of the kitchen and a dietary consult was made to see if patient can eat 6 small meals a day. Per dietary, patient is unable to order 6 meals from the kitchen daily, but can order more food with her meals and have her trays saved. Dr. Rangel also unheld patient's psychotropic medications. Zoloft 25 mg was given. Patient did eat half of her sandwich at lunch and was given another Gatorade. States she drank \"as much of the Ensure as I could.\" Per Dr. Rangel, patient is also allowed to have her boyfriend drop off food for her one time only. During quiet time, patient's boyfriend brought in [employer] for her and she ate all of the chicken nuggets. Patient was not hungry at dinner time due to eating the [employer] so her tray was saved. Patient was provided with the Gatorade from her dinner tray. Patient had family session with  and her boyfriend today. Patient has a follow up appointment with [facility] on 2/14 at 12:30 PM. Patient denies suicidal or homicidal ideations. Denies auditory or visual hallucinations. Will continue to monitor. Safety precautions maintained.     COVID 19 screening has been completed and no concerns at this time. Pt compliant with masking and social distancing.     Psychotropic medication compliance: yes    Psychotropic medication side effects: none     Psychotropic  PRNs given: none     Appetite and food intake: fair      Sleep hours (if applicable):

## 2024-02-12 NOTE — TELEPHONE ENCOUNTER
Medication(s) to Refill:   Requested Prescriptions     Pending Prescriptions Disp Refills    FOLIC ACID 1 MG Oral Tab [Pharmacy Med Name: FOLIC ACID 1MG TABLETS] 90 tablet 0     Sig: TAKE 1 TABLET BY MOUTH EVERY DAY         Reason for Medication Refill being sent to Provider / Reason Protocol Failed:  [] 90 day refill has already been granted  [] Blood Pressure out of range  [] Labs Abnormal/over due  [] Medication not previously prescribed by Provider  [] Non-Protocol Medication  [] Controlled Substance   [] Due for appointment- no future appointment scheduled  [] No Follow up specified      Last Time Medication was Filled:  11/15/23      Last Office Visit with PCP: 4/10/23    When Patient was Due Back to the Office:  2 months  (from when PCP last addressed condition)    Future Appointments:  Future Appointments   Date Time Provider Department [place]   2/13/2024  2:30 PM Yanira Rousseau MD PF HEM ONC [place]   3/12/2024 11:00 AM Ree Murray MD [facility]         Last Blood Pressures:  BP Readings from Last 2 Encounters:   09/06/23 139/41   04/10/23 136/60         Action taken:  [] Refill approved per protocol  [] Routing to provider for approval

## 2024-02-13 ENCOUNTER — OFFICE VISIT (OUTPATIENT)
Dept: HEMATOLOGY/ONCOLOGY | Age: 89
End: 2024-02-13
Attending: INTERNAL MEDICINE
Payer: MEDICARE

## 2024-02-13 VITALS
HEART RATE: 64 BPM | WEIGHT: 213.5 LBS | RESPIRATION RATE: 18 BRPM | BODY MASS INDEX: 36.45 KG/M2 | HEIGHT: 64.02 IN | DIASTOLIC BLOOD PRESSURE: 73 MMHG | TEMPERATURE: 98 F | SYSTOLIC BLOOD PRESSURE: 130 MMHG | OXYGEN SATURATION: 97 %

## 2024-02-13 DIAGNOSIS — D64.9 NORMOCYTIC NORMOCHROMIC ANEMIA: ICD-10-CM

## 2024-02-13 DIAGNOSIS — C34.92 SQUAMOUS CELL CARCINOMA OF LEFT LUNG (HCC): ICD-10-CM

## 2024-02-13 DIAGNOSIS — C67.8 MALIGNANT NEOPLASM OF OVERLAPPING SITES OF BLADDER (HCC): ICD-10-CM

## 2024-02-13 DIAGNOSIS — Z79.01 CHRONIC ANTICOAGULATION: ICD-10-CM

## 2024-02-13 DIAGNOSIS — I82.90 VTE (VENOUS THROMBOEMBOLISM): Primary | ICD-10-CM

## 2024-02-13 DIAGNOSIS — C34.12 SMALL CELL CARCINOMA OF UPPER LOBE OF LEFT LUNG (HCC): ICD-10-CM

## 2024-02-13 DIAGNOSIS — N18.30 STAGE 3 CHRONIC KIDNEY DISEASE, UNSPECIFIED WHETHER STAGE 3A OR 3B CKD (HCC): ICD-10-CM

## 2024-02-13 LAB
ALBUMIN SERPL-MCNC: 3.4 G/DL (ref 3.4–5)
ALBUMIN/GLOB SERPL: 1 {RATIO} (ref 1–2)
ALP LIVER SERPL-CCNC: 79 U/L
ALT SERPL-CCNC: 21 U/L
ANION GAP SERPL CALC-SCNC: 5 MMOL/L (ref 0–18)
AST SERPL-CCNC: 15 U/L (ref 15–37)
BASOPHILS # BLD AUTO: 0.06 X10(3) UL (ref 0–0.2)
BASOPHILS NFR BLD AUTO: 0.6 %
BILIRUB SERPL-MCNC: 0.6 MG/DL (ref 0.1–2)
BUN BLD-MCNC: 34 MG/DL (ref 9–23)
CALCIUM BLD-MCNC: 9.1 MG/DL (ref 8.5–10.1)
CHLORIDE SERPL-SCNC: 114 MMOL/L (ref 98–112)
CO2 SERPL-SCNC: 23 MMOL/L (ref 21–32)
CREAT BLD-MCNC: 1.32 MG/DL
EGFRCR SERPLBLD CKD-EPI 2021: 39 ML/MIN/1.73M2 (ref 60–?)
EOSINOPHIL # BLD AUTO: 0.4 X10(3) UL (ref 0–0.7)
EOSINOPHIL NFR BLD AUTO: 3.9 %
ERYTHROCYTE [DISTWIDTH] IN BLOOD BY AUTOMATED COUNT: 15 %
GLOBULIN PLAS-MCNC: 3.5 G/DL (ref 2.8–4.4)
GLUCOSE BLD-MCNC: 93 MG/DL (ref 70–99)
HCT VFR BLD AUTO: 36.6 %
HGB BLD-MCNC: 12 G/DL
IMM GRANULOCYTES # BLD AUTO: 0.05 X10(3) UL (ref 0–1)
IMM GRANULOCYTES NFR BLD: 0.5 %
LDH SERPL L TO P-CCNC: 190 U/L
LYMPHOCYTES # BLD AUTO: 2.58 X10(3) UL (ref 1–4)
LYMPHOCYTES NFR BLD AUTO: 25 %
MCH RBC QN AUTO: 32.5 PG (ref 26–34)
MCHC RBC AUTO-ENTMCNC: 32.8 G/DL (ref 31–37)
MCV RBC AUTO: 99.2 FL
MONOCYTES # BLD AUTO: 1.18 X10(3) UL (ref 0.1–1)
MONOCYTES NFR BLD AUTO: 11.5 %
NEUTROPHILS # BLD AUTO: 6.03 X10 (3) UL (ref 1.5–7.7)
NEUTROPHILS # BLD AUTO: 6.03 X10(3) UL (ref 1.5–7.7)
NEUTROPHILS NFR BLD AUTO: 58.5 %
OSMOLALITY SERPL CALC.SUM OF ELEC: 301 MOSM/KG (ref 275–295)
PLATELET # BLD AUTO: 196 10(3)UL (ref 150–450)
POTASSIUM SERPL-SCNC: 4.6 MMOL/L (ref 3.5–5.1)
PROT SERPL-MCNC: 6.9 G/DL (ref 6.4–8.2)
RBC # BLD AUTO: 3.69 X10(6)UL
SODIUM SERPL-SCNC: 142 MMOL/L (ref 136–145)
WBC # BLD AUTO: 10.3 X10(3) UL (ref 4–11)

## 2024-02-13 PROCEDURE — 99215 OFFICE O/P EST HI 40 MIN: CPT | Performed by: INTERNAL MEDICINE

## 2024-02-13 RX ORDER — FOLIC ACID 1 MG/1
TABLET ORAL
Qty: 90 TABLET | Refills: 0 | Status: SHIPPED | OUTPATIENT
Start: 2024-02-13

## 2024-02-13 NOTE — PROGRESS NOTES
Cancer Center Progress Note    Patient Name: Gillian Chávez   YOB: 1935   Medical Record Number: [id number]   CSN: [id number]   Attending Physician: Yanira Rousseau M.D.   Referring Physician: MD Dr. Sujit Byrd Dr., Dr., Dr., Dr.    Date of Visit: 2/13/2024     Chief Complaint:  Chief Complaint   Patient presents with    Follow - Up          Hem/Onc History:  1. Lung cancer x 2 s/p left VATS upper and lower lobe wedge resections at [facility] on 1/24/22. 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     - On 3/9/21 she presented with SOB and went to [facility] ED 3/9/21. CTA chest was done which showed no PE but did show a KAILYN and LLL nodule.     Given the presence of the 2 lung nodules she was referred to Pulmonary and saw Dr. Marks. She was recommended continued monitoring of the lung nodules with a repeat CT. This was done 4/2021 which showed the KAILYN nodule was smaller (10 x 7.7 mm -->6 x 9 mm) and the LLL nodule was slightly larger (8 x 3.8 mm--> 8.7 x 6.4 mm). She had a f/u CT 9/3/21 which showed LLL nodule increased to 10 mm and the KAILYN nodule had diminished in size. PET scan was subsequently done which showed uptake corresponding to the lung nodules- 5 mm lingular nodule SUV of 2.94, 9 mm LLL nodule SUV of 3.8. She was then referred to Thoracic surgery. She saw Dr. Murdock who recommended diagnostic wedge resection of increasing LLL nodule. Surgery was apparently delayed due to persistent anemia. She then had a f/u CT 1/7/22 which showed LULand LLL  nodules have increased in size. She was recommended wedge resection of both nodules.      - Received her first cycle of carboplatin and etoposide (adjuvant therapy for resected SCLC) 2/22/22      - Completed 4 cycles 5/4/22.      - Met with Rad Onc to discuss PCI. She  decided not to pursue.       2. 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 [place] to probably provoked with recent flight to [place]. Saw my colleague Dr. Arreguin at that time. LA panel was done which was negative. She stopped Eliquis around 1/2020.     - Most recent VTE was in 2/2021. She underwent back surgery for spinal stenosis with radiculopathy 1/27/21 at [facility]. She was discharged 1/30/21. She then presented with acute SOB and was seen at [facility] 2/8/21. W/u revealed saddle PE and BLE DVTs. She was placed on a heparin drip and eventually transitioned to Xarelto at discharge. CT imaging done at that time revealed a 1.4 cm KAILYN nodule.     She then developed dark stools and anemia (Hb was 8.6) and was readmitted 2/23/21 at [facility]. Her Xarelto was held and underwent EGD/colonoscopy. Per records no source of bleeding was found though prep was poor. She was given Venofer and started on Eliquis. She was discharged home on oral iron.       3. h/o low grade papillary urothelial carcinoma and underwent TURBT in 2015. Received mitomycin x 6 completed 3/2/2016    4. H/o CPPD, gout and psoriatic arthritis.       She was seen by one of my colleagues Dr. Arreguin and was also followed by Dr. Darrin Couch of [facility]. Transferred care 2/2022      History of Present Illness:   She c/o arthritic and back pain. Got a trial injection of her back which she says did not help her pain. Pain limits her activity and has gained weight.  Also fatigued. No nausea or vomiting.  Denies chest or abdominal pain, change in bowel or urinary habits, headaches, dizziness or visual symptoms.     Past Medical History:  Past Medical History:   Diagnosis Date    Arthritis     psoriatic    Back problem     Bladder cancer (HCC) 9/2/15    TURBT - Dr. Modi    Disorder of thyroid     High blood pressure     High cholesterol     Hypothyroid     Incontinence of urine     Lung cancer (HCC)      Small cell lung ca    Osteoarthritis     Other and unspecified hyperlipidemia     Unspecified essential hypertension        Past Surgical History:  Past Surgical History:   Procedure Laterality Date    ANESTH,KNEE AREA SURGERY      arthrocentesis aspiration of knee joint    CATARACT      COLONOSCOPY      CYSTOURETHROSCOPY,FULGUR 2-5CM LESN Right 2015    Procedure: CYSTOSCOPY WITH RETROGRADE PYELOGRAM;  Surgeon: Pranay Modi MD;  Location: [facility]    CYSTOURETHROSCOPY,FULGUR <.5CM LESN N/A 2015    Procedure: CYSTOSCOPY TRANSURETHRAL RESECTION OF BLADDER TUMOR W/ MITOMYCIN;  Surgeon: Pranay Modi MD;  Location: [facility]    HYSTERECTOMY  76    ZELALEM-BSO    INSTILL ANTICANCER AGENT IN BLADDER Right 2015    Procedure: CYSTOSCOPY WITH RETROGRADE PYELOGRAM;  Surgeon: Pranay Modi MD;  Location: [facility]    MITOMYCIN 5 MG INJ N/A 2015    Procedure: CYSTOSCOPY TRANSURETHRAL RESECTION OF BLADDER TUMOR W/ MITOMYCIN;  Surgeon: Pranay Modi MD;  Location: [facility]    OOPHORECTOMY  1974    OTHER  99    gastrotomy with suture of bleeding ulcer    OTHER SURGICAL HISTORY  12/1/15    BTS Cysto James Modi    OTHER SURGICAL HISTORY  16    BTS CystoJames Modi    OTHER SURGICAL HISTORY  16    BTS Hector Modi    OTHER SURGICAL HISTORY  2017    BTS maurizio Modi    OTHER SURGICAL HISTORY  2019    BTS CystoJames Modi    OTHER SURGICAL HISTORY      neck surgery    OTHER SURGICAL HISTORY  2020    Maurizio Modi     OTHER SURGICAL HISTORY      Lung       Family Medical History:  Family History   Problem Relation Age of Onset    Cancer Father         liver    Diabetes Father     Heart Disorder Mother     Diabetes Mother     Diabetes Sister     Heart Disorder Sister     Diabetes Brother     Cancer Maternal Grandmother         ? colon cancer       Gyne History:  OB History    Para Term   AB Living   0 0 0 0 0 0   SAB IAB Ectopic Multiple Live Births   0 0 0 0 0   Obstetric Comments   Children are adopted   Menarche: 15 y/o   Menopause due to ZELALEM-BSO in  due to heavy bleeding/fibroids       Psychosocial History:  Social History     Socioeconomic History    Marital status:      Spouse name: Not on file    Number of children: 2    Years of education: Not on file    Highest education level: Not on file   Occupational History    Occupation: RETIRED     Tobacco Use    Smoking status: Former     Packs/day: 1.00     Years: 44.00     Additional pack years: 0.00     Total pack years: 44.00     Types: Cigarettes     Start date: 1957     Quit date: 2001     Years since quittin.0    Smokeless tobacco: Never   Vaping Use    Vaping Use: Never used   Substance and Sexual Activity    Alcohol use: Not Currently     Alcohol/week: 2.0 standard drinks of alcohol     Types: 2 Glasses of wine per week     Comment: daily - none recently    Drug use: No    Sexual activity: Not Currently     Partners: Male     Birth control/protection: Hysterectomy   Other Topics Concern     Service Not Asked    Blood Transfusions Not Asked    Caffeine Concern Yes     Comment: coffee 1 cup daily    Occupational Exposure Not Asked    Hobby Hazards Not Asked    Sleep Concern Not Asked    Stress Concern Not Asked    Weight Concern Not Asked    Special Diet Not Asked    Back Care Not Asked    Exercise No    Bike Helmet Not Asked    Seat Belt Yes    Self-Exams Not Asked   Social History Narrative    , lives alone    Has 1 son - in [place]    Has 1 daughter - lives close by     Social Determinants of Health     Financial Resource Strain: Not on file   Food Insecurity: Not on file   Transportation Needs: Not on file   Physical Activity: Not on file   Stress: Not on file   Social Connections: Not on file   Housing Stability: Not on file       Allergies:   Allergies   Allergen  Reactions    Diazepam OTHER (SEE COMMENTS)     Other reaction(s): Other    Amoxil RASH     TABS    Augmentin, [Amoxicillin-Pot Clavulanate] UNKNOWN    Cephalosporins      hives    Penicillins      hives    Codeine RASH       Current Medications:  Current Outpatient Medications on File Prior to Visit   Medication Sig Dispense Refill    folic acid 1 MG Oral Tab TAKE 1 TABLET BY MOUTH EVERY DAY 90 tablet 0    metoprolol succinate ER 50 MG Oral Tablet 24 Hr Take 1 tablet (50 mg total) by mouth daily. 90 tablet 1    ELIQUIS 5 MG Oral Tab TAKE 1 TABLET(5 MG) BY MOUTH TWICE DAILY 60 tablet 0    SIMVASTATIN 20 MG Oral Tab TAKE 1 TABLET(20 MG) BY MOUTH EVERY EVENING 90 tablet 0    furosemide 20 MG Oral Tab Take 1 tablet (20 mg total) by mouth daily. 90 tablet 0    potassium chloride 20 MEQ Oral Tab CR Take 1 tablet (20 mEq total) by mouth daily. 90 tablet 1    Omeprazole 40 MG Oral Capsule Delayed Release Take 1 capsule (40 mg total) by mouth daily. 90 capsule 1    FLUoxetine 20 MG Oral Cap Take 1 capsule (20 mg total) by mouth daily. 90 capsule 1    SYNTHROID 50 MCG Oral Tab Take 1 tablet (50 mcg total) by mouth daily. 90 tablet 1    Acetaminophen ER (ACETAMINOPHEN 8 HOUR) 650 MG Oral Tab CR Take 1 tablet (650 mg total) by mouth every 8 (eight) hours as needed for Pain.      allopurinol 300 MG Oral Tab Take 1 tablet (300 mg total) by mouth daily.      valsartan 80 MG Oral Tab Take 1 tablet (80 mg total) by mouth daily.      Magnesium Oxide 400 (240 Mg) MG Oral Tab Take 1 tablet (400 mg total) by mouth daily.      albuterol (2.5 MG/3ML) 0.083% Inhalation Nebu Soln Take 3 mL (2.5 mg total) by nebulization every 4 (four) hours as needed.      clobetasol 0.05 % External Cream Apply topically daily as needed.      simvastatin 40 MG Oral Tab Take 20mg (1/2 tablet) by mouth nightly. 45 tablet 0    SPIRIVA RESPIMAT 2.5 MCG/ACT Inhalation Aero Soln Inhale 1 puff into the lungs daily.      FEROSUL 325 (65 Fe) MG Oral Tab Take 1  tablet (325 mg total) by mouth daily with breakfast.      hydroxychloroquine 200 MG Oral Tab Take 1 tablet (200 mg total) by mouth 2 (two) times daily.      colchicine 0.6 MG Oral Tab Take 1 tablet (0.6 mg total) by mouth daily.      PREVIDENT 5000 PLUS 1.1 % Dental Cream       Calcium Carb-Cholecalciferol (CALTRATE 600+D3 OR) Take 1 tablet by mouth daily.      TraMADol HCl 50 MG Oral Tab Take 1 tablet (50 mg total) by mouth every 8 (eight) hours as needed.  0     No current facility-administered medications on file prior to visit.       Review of Systems:  A 14-point ROS was done with pertinent positives and negative per the HPI    Vital Signs:  /73 (BP Location: Left arm, Patient Position: Sitting, Cuff Size: large)   Pulse 64   Temp 97.5 °F (36.4 °C) (Tympanic)   Resp 18   Ht 1.626 m (5' 4.02\")   Wt 96.8 kg (213 lb 8 oz)   SpO2 97%   BMI 36.63 kg/m²     Physical Examination:  General: Patient is alert and oriented x 3, not in acute distress.   HEENT: EOMs intact. PERRL. Oropharynx clear   Neck: No JVD. No palpable lymphadenopathy. Neck is supple.  Chest: Clear to auscultation. Incision sites left well healed   Heart: Regular  Abdomen: Soft, non tender with good bowel sounds.  Extremities: Pedal pulses are present. BLE edema. Deformities PIP joints  Neurological: Grossly intact.   Lymphatics: There is no palpable lymphadenopathy throughout in the cervical, supraclavicular, axillary, or inguinal regions.  Psych/Depression: Mood and affect are appropriate.    Laboratory:  Lab Results   Component Value Date    WBC 7.8 03/28/2023    RBC 3.60 (L) 03/28/2023    HGB 11.4 (L) 03/28/2023    HCT 34.6 (L) 03/28/2023    .0 03/28/2023    MCV 96.1 03/28/2023    MCH 31.7 03/28/2023    MCHC 32.9 03/28/2023    RDW 15.7 03/28/2023    NEPRELIM 3.72 03/28/2023    NEUTABS 9.17 (H) 05/03/2022    LYMPHABS 1.57 05/03/2022    EOSABS 0.13 05/03/2022    BASABS 0.00 05/03/2022    NEUT 68 05/03/2022    LYMPH 12 05/03/2022     MON 16 05/03/2022    EOS 1 05/03/2022    BASO 0 05/03/2022    NEPERCENT 47.8 03/28/2023    LYPERCENT 31.8 03/28/2023    MOPERCENT 13.4 03/28/2023    EOPERCENT 5.7 03/28/2023    BAPERCENT 1.0 03/28/2023    NE 3.72 03/28/2023    LYMABS 2.47 03/28/2023    MOABSO 1.04 (H) 03/28/2023    EOABSO 0.44 03/28/2023    BAABSO 0.08 03/28/2023     Lab Results   Component Value Date    GLU 82 03/28/2023    BUN 25 (H) 03/28/2023    BUNCREA 29.5 (H) 05/25/2021    CREATSERUM 1.41 (H) 03/28/2023    ANIONGAP 9 03/28/2023    GFR 52 (L) 04/05/2017    GFRNAA 41 (L) 07/18/2022    GFRAA 47 (L) 07/18/2022    CA 9.2 03/28/2023    OSMOCALC 297 (H) 03/28/2023    ALKPHO 76 03/28/2023    AST 18 03/28/2023    ALT 17 03/28/2023    BILT 0.7 03/28/2023    TP 6.5 03/28/2023    ALB 3.5 03/28/2023    GLOBULIN 3.0 03/28/2023    AGRATIO 1.6 07/22/2011     03/28/2023    K 4.2 03/28/2023     03/28/2023    CO2 24.0 03/28/2023     Lab Component   Component Value Date/Time     03/28/2023 1320       Radiology:  PROCEDURE:  MRI BRAIN (W+WO) (CPT=70553)     COMPARISON:  [facility], MR, MRI BRAIN (W+WO) (CPT=70553), 4/11/2023, 12:27 PM.     INDICATIONS:  C34.12 Small cell carcinoma of upper lobe of left lung (HCC) C34.92 Squamous cell carcinoma of left lung (HCC)     TECHNIQUE:  MRI of the brain was performed with multi-planar T1, T2-weighted images with FLAIR sequences and diffusion weighted images without and with infusion.     PATIENT STATED HISTORY:(As transcribed by Technologist)  Lung cancer.      CONTRAST USED:  30 mL of Dotarem     FINDINGS:  Patient motion noted.  This may obscure subtle intracranial metastatic disease.     Stable mild global brain parenchymal volume loss without overt hydrocephalus.  There is no midline shift or mass effect.  The basal cisterns are patent.  The gray-white matter differentiation is intact.  The craniocervical junction is unremarkable.       Stable mild chronic microvascular ischemic changes in the  cerebral white matter and lissette.  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     There is no acute intracranial hemorrhage or extra-axial fluid collection identified.  No significant abnormal parenchymal gradient susceptibility. There is no abnormal parenchymal or leptomeningeal enhancement.  There is no restricted diffusion to  suggest acute ischemia/infarction.     Trace fluid in the mastoid air cells.  Minimal ethmoid mucosal thickening.  Bilateral intra-ocular lens implants.  The expected major intracranial flow voids are present.  Partially imaged cervical fusion hardware in the upper cervical spine.                      Impression   CONCLUSION:       1. No acute intracranial abnormality identified.  No evidence of intracranial metastatic disease within the limitations of the exam.     2. Stable mild chronic microvascular ischemic changes in the cerebral white matter and lissette.  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     Please see above for further details.     LOCATION:  [id number]        Dictated by (CST): Chandan Pratt MD on 10/02/2023 at 12:34 PM      Finalized by (CST): Chandan Pratt MD on 10/02/2023 at 12:39 PM       PROCEDURE:  CT CHEST(CONTRAST ONLY) (CPT=71260)     COMPARISON:  [facility], CT, CT CHEST (CPT=71250), 4/11/2023, 12:09 PM.     INDICATIONS:  C34.12 Small cell carcinoma of upper lobe of left lung (HCC) C34.92 Squamous cell carcinoma of left lung (HCC)     TECHNIQUE:  CT images were obtained with non-ionic intravenous contrast material. Dose reduction techniques were used. Dose information is transmitted to the ACR (American College of Radiology) NRDR (National Radiology Data Registry) which includes the  Dose Index Registry.     PATIENT STATED HISTORY:(As transcribed by Technologist)  Patient is here for a follow up for history of left lung cancer.      CONTRAST USED:  75cc of Isovue 370     FINDINGS:    LUNGS:  There is a new 0.1 cm nodule in the very posterior right upper lobe  abutting major fissure.  Series 301, image 46.    Stable postsurgical/treatment changes on the left, including areas of scarring and moderate bronchiectasis.  The previously measured area of soft tissue is unchanged and may be scarring.  2.2 x 0.9 cm series 301 image 60, previously measured at 2.4 x  0.8 cm.    VASCULATURE:  Smooth tapering.  ARI:  No adenopathy.  MEDIASTINUM:  No adenopathy.  The thyroid gland is partially imaged.  1.4 cm right nodule is unchanged.  CARDIAC:  No pericardial effusion.  No significant coronary calcifications.  PLEURA:  No pleural effusion.  THORACIC AORTA:  Normal caliber.  CHEST WALL:  No axillary adenopathy.  LIMITED ABDOMEN:  Small left adrenal nodule measuring 1.1 x 1.0 cm, remeasured on the prior at 1.2 x 1.0 cm.  Calcifications in the liver and spleen consistent with prior granulomatous disease.  BONES:  Overall moderate to severe degenerative changes.                      Impression   CONCLUSION:    1. New tiny right-sided nodule.  This is most likely inflammatory/infectious in nature.  A short-term, 3-4 month follow-up could offer continued assessment.  2. Other findings appear stable.  3. Details as above.               LOCATION:  [place]        Dictated by (CST): David Daniels MD on 8/15/2023 at 9:44 AM      Finalized by (CST): David Daniels MD on 8/15/2023 at 9:54 AM       PROCEDURE:  CT CHEST (CPT=71250)     LOCATION:  [facility]     COMPARISON:  [facility], CT, CT CHEST (CPT=71250), 11/30/2022, 9:14 AM.     INDICATIONS:  C34.92 Squamous cell carcinoma of left lung (HCC) C34.12 Small cell carcinoma of upper lobe of left lung (HCC)     TECHNIQUE:  Unenhanced multislice CT scanning is performed through the chest.  Dose reduction techniques were used. Dose information is transmitted to the ACR (American College of Radiology) NRDR (National Radiology Data Registry) which includes the Dose   Index Registry.     PATIENT STATED HISTORY: (As transcribed by Technologist)  Follow up on lung  ca. No current complaints.         FINDINGS:    LUNGS:  Postsurgical changes of partial left pneumonectomy with pleural parenchymal scarring.  Surgical clips are along the major fissure.  Scar-like opacity measures 24 x 8 versus 26 x 11 mm along the major fissure in the area of the sutures is without  significant change.  Subpleural linear scarring is also noted in the region of the lingula also stable.  Left lower lobe anterior segment minimal scarring is along the bronchovascular bundle and is stable.  There is no new pulmonary nodule or focal  consolidation.  VASCULATURE:  Thrombus cannot be excluded without intravenous contrast.    ARI:  No mass or adenopathy.  Right hilar calcified nodes are consistent with prior granulomatous disease.  MEDIASTINUM:  No mass or adenopathy.    CARDIAC:  Coronary artery calcifications are mild.  There is no pericardial effusion.  PLEURA:  No mass or effusion.    THORACIC AORTA:  Atherosclerosis.  CHEST WALL:  No mass or axillary adenopathy.    LIMITED ABDOMEN:  Limited images of the upper abdomen demonstrate multiple intrahepatic and intrasplenic calcifications consistent with prior granulomatous disease.    BONES:  Stable including diffuse degenerative change.  No fracture.                     Impression   CONCLUSION:    1. No significant change when compared to most recent noncontrast CT of the chest performed 11/30/2022.        Dictated by (CST): Trisha Holbrook MD on 4/11/2023 at 8:37 PM      Finalized by (CST): Trisha Holbrook MD on 4/11/2023 at 8:43 PM         PROCEDURE:  MRI BRAIN (W+WO) (CPT=70553)     LOCATION:                                           [facility]  [facility] , MRI BRAIN (W+WO) (CPT=70553), 8/24/2022, 1:02 PM.     INDICATIONS:  C34.92 Squamous cell carcinoma of left lung (HCC) C34.12 Small cell carcinoma of upper lobe of left lung (HCC)     TECHNIQUE:  MRI of the brain was performed with multi-planar T1, T2-weighted images with FLAIR sequences and diffusion  weighted images without and with infusion.     PATIENT STATED HISTORY:(As transcribed by Technologist)  Lung cancer, surveillance examination.      CONTRAST USED:  28 mL of Dotarem     FINDINGS:  Patient motion noted.     The ventricles and sulci are within normal limits.  There is no midline shift or mass effect.  The basal cisterns are patent.  The gray-white matter differentiation is intact.  The craniocervical junction is unremarkable.  Partially empty sella.     Stable mild chronic microvascular ischemic changes in the cerebral white matter and lissette.  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     There is no acute intracranial hemorrhage or extra-axial fluid collection identified.  No significant abnormal parenchymal gradient susceptibility. There is definite no abnormal parenchymal or leptomeningeal enhancement.  There is no restricted diffusion   to suggest acute ischemia/infarction.     Bilateral intra-ocular lens implants.  Minimal fluid in the bilateral mastoid air cells.  Trace ethmoid and maxillary mucosal thickening.  The expected major intracranial flow voids are present.  Posterior cervical fusion hardware in the partially imaged   upper cervical spine.                      Impression   CONCLUSION:       1. No acute intracranial abnormality identified.  No specific findings to suggest intracranial metastatic disease within the limitations of the exam.     2. Stable mild chronic microvascular ischemic changes in the cerebral white matter and lissette.          Dictated by (CST): Chandan Pratt MD on 4/11/2023 at 1:53 PM      Finalized by (CST): Chandan Pratt MD on 4/11/2023 at 1:56 PM         Impression & Plan:   1. Lung cancer x 2 s/p wedge resections (both NSCLC and SCLC)- Last seen in 3/2023. Overdue for surveillance scans which I ordered. Last scans in August showed no overt evidence of malignancy. Did have a new tiny right sided nodule felt to likely be inflammatory/infectious.  MRI brain not due  until April. Did have MRI brain in October which showed no acute intracranial abnormality.     2. OA, psoriatric arthritis, pseudogout- followed by rheum. On hydroxychloroquine. Not a candidate for NSAIDs given CKD. Does not want to pursue biologics. On allopurinol    3. Anemia- from previous chemo and likely some component from CKD. Back to normal     4. H/o low grade papillary urothelial cancer- follows with . Last cysto was 1/21/2020 which was negative. Behind with f/u with  and cysto.  She will contact them for an appointment    5. 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 Dr. Couch/Lindsay Silva NP of [facility] followed her for this     6. CKD- followed by nephrology in [place] (Dr. Person)    7. Chronic LBP followed at the pain clinic    8. COPD on Spiriva and albuterol.  No recent COPD exacerbations.       Labs and imaging reviewed    CT C/A/P and MRI brain ordered    RTC 6 months     MD [facility]

## 2024-02-13 NOTE — PROGRESS NOTES
Here for MD fu visit for h/o lung ca  Had tooth pulled yesterday, resumed Eliquis this AM  Chronic back pain  No CP, SOB  No HA, dizziness  CT done this past August, MRI done in 10/23  Education Record     Learner:  Patient     Disease / Diagnosis: lung ca     Barriers / Limitations: none                 Comments:     Method:  Discussion                Comments:     General Topics:  Plan of care reviewed                Comments:     Outcome:  Shows understanding                Comments:  Pt given order for CT scan and MRI

## 2024-02-26 ENCOUNTER — TELEPHONE (OUTPATIENT)
Dept: HEMATOLOGY/ONCOLOGY | Facility: HOSPITAL | Age: 89
End: 2024-02-26

## 2024-02-26 ENCOUNTER — HOSPITAL ENCOUNTER (OUTPATIENT)
Dept: CT IMAGING | Age: 89
Discharge: HOME | End: 2024-02-26
Attending: INTERNAL MEDICINE
Payer: MEDICARE

## 2024-02-26 DIAGNOSIS — C34.92 SQUAMOUS CELL CARCINOMA OF LEFT LUNG (HCC): ICD-10-CM

## 2024-02-26 DIAGNOSIS — C34.12 SMALL CELL CARCINOMA OF UPPER LOBE OF LEFT LUNG (HCC): ICD-10-CM

## 2024-02-26 PROCEDURE — 71260 CT THORAX DX C+: CPT | Performed by: INTERNAL MEDICINE

## 2024-02-26 PROCEDURE — 74177 CT ABD & PELVIS W/CONTRAST: CPT | Performed by: INTERNAL MEDICINE

## 2024-02-27 RX ORDER — FUROSEMIDE 20 MG/1
20 TABLET ORAL DAILY
Qty: 90 TABLET | Refills: 0 | Status: SHIPPED | OUTPATIENT
Start: 2024-02-27

## 2024-02-27 NOTE — TELEPHONE ENCOUNTER
Medication(s) to Refill:   Requested Prescriptions     Pending Prescriptions Disp Refills    FUROSEMIDE 20 MG Oral Tab [Pharmacy Med Name: FUROSEMIDE 20MG TABLETS] 90 tablet 0     Sig: TAKE 1 TABLET(20 MG) BY MOUTH DAILY         Reason for Medication Refill being sent to Provider / Reason Protocol Failed:  [] 90 day refill has already been granted  [] Blood Pressure out of range  [] Labs Abnormal/over due  [] Medication not previously prescribed by Provider  [] Non-Protocol Medication  [] Controlled Substance   [] Due for appointment- no future appointment scheduled  [] No Follow up specified      Last Time Medication was Filled:  11/16/23      Last Office Visit with PCP: 4/10/23    When Patient was Due Back to the Office:  2 months  (from when PCP last addressed condition)    Future Appointments:  Future Appointments   Date Time Provider Department [place]   3/12/2024 11:00 AM Ree Murray MD [facility] 17 [facility]   8/13/2024  1:30 PM Yanira Rousseau MD PF HEM ONC [place]         Last Blood Pressures:  BP Readings from Last 2 Encounters:   02/13/24 130/73   09/06/23 139/41       Action taken:  [] Refill approved per protocol  [] Routing to provider for approval

## 2024-03-01 DIAGNOSIS — E03.9 ACQUIRED HYPOTHYROIDISM: ICD-10-CM

## 2024-03-04 RX ORDER — LEVOTHYROXINE SODIUM 50 MCG
50 TABLET ORAL DAILY
Qty: 90 TABLET | Refills: 1 | Status: SHIPPED | OUTPATIENT
Start: 2024-03-04 | End: 2024-03-25

## 2024-03-04 NOTE — TELEPHONE ENCOUNTER
Medication(s) to Refill:   Requested Prescriptions     Pending Prescriptions Disp Refills    SYNTHROID 50 MCG Oral Tab [Pharmacy Med Name: SYNTHROID 0.05MG (50MCG)TABLETS] 90 tablet 1     Sig: TAKE 1 TABLET(50 MCG) BY MOUTH DAILY         Reason for Medication Refill being sent to Provider / Reason Protocol Failed:  [] 90 day refill has already been granted  [] Blood Pressure out of range  [] Labs Abnormal/over due  [] Medication not previously prescribed by Provider  [] Non-Protocol Medication  [] Controlled Substance   [] Due for appointment- no future appointment scheduled  [] No Follow up specified      Last Time Medication was Filled:  12/5/2023      Last Office Visit with PCP: 4/10/2023    When Patient was Due Back to the Office:  2 months   (from when PCP last addressed condition)    Future Appointments:  Future Appointments   Date Time Provider Department Center   3/12/2024 11:00 AM Ree Murray MD [facility] 17 [facility]   8/13/2024  1:30 PM Yanira Rousseau MD PF HEM ONC [place]         Last Blood Pressures:  BP Readings from Last 2 Encounters:   02/13/24 130/73   09/06/23 139/41       Action taken:  [] Refill approved per protocol  [x] Routing to provider for approval

## 2024-03-07 ENCOUNTER — TELEPHONE (OUTPATIENT)
Dept: FAMILY MEDICINE CLINIC | Facility: CLINIC | Age: 89
End: 2024-03-07

## 2024-03-11 DIAGNOSIS — F33.8 SEASONAL DEPRESSION (HCC): ICD-10-CM

## 2024-03-11 RX ORDER — CLOBETASOL PROPIONATE 0.46 MG/ML
1 SOLUTION TOPICAL
COMMUNITY
Start: 2024-01-17

## 2024-03-11 RX ORDER — MELATONIN
1000 DAILY
COMMUNITY

## 2024-03-11 RX ORDER — NYSTATIN 100000 [USP'U]/G
POWDER TOPICAL EVERY 6 HOURS PRN
COMMUNITY

## 2024-03-11 RX ORDER — APIXABAN 5 MG/1
5 TABLET, FILM COATED ORAL 2 TIMES DAILY
Qty: 60 TABLET | Refills: 2 | Status: SHIPPED | OUTPATIENT
Start: 2024-03-11

## 2024-03-11 RX ORDER — UMECLIDINIUM BROMIDE AND VILANTEROL TRIFENATATE 62.5; 25 UG/1; UG/1
1 POWDER RESPIRATORY (INHALATION) DAILY
COMMUNITY

## 2024-03-11 NOTE — TELEPHONE ENCOUNTER
COMPREHENSIVE MEDICATION REVIEW         Gillian Chávez MRN [id number]    1935 PCP Ree Murray MD     Comments: Medication history completed by Ambulatory Clinic Pharmacist over the phone on 3/11/24. Patient has upcoming AWV with PCP on 3/12/24.     After thorough medication review, 14 discrepancies have been identified and corrected on patient's medication list. See updated list below:     Outpatient Encounter Medications as of 3/7/2024   Medication Sig    apixaban (ELIQUIS) 5 MG Oral Tab TAKE 1 TABLET(5 MG) BY MOUTH TWICE DAILY    Nystatin (NYSTOP) 190815 UNIT/GM External Powder Apply topically every 6 (six) hours as needed.    ANORO ELLIPTA 62.5-25 MCG/ACT Inhalation Aerosol Powder, Breath Activated Inhale 1 puff into the lungs daily.    clobetasol 0.05 % External Solution Apply 1 Application topically daily as needed (to scalp).    cholecalciferol 25 MCG (1000 UT) Oral Tab Take 1 tablet (1,000 Units total) by mouth daily.    SYNTHROID 50 MCG Oral Tab TAKE 1 TABLET(50 MCG) BY MOUTH DAILY    furosemide 20 MG Oral Tab Take 1 tablet (20 mg total) by mouth daily.    folic acid 1 MG Oral Tab TAKE 1 TABLET BY MOUTH EVERY DAY    metoprolol succinate ER 50 MG Oral Tablet 24 Hr Take 1 tablet (50 mg total) by mouth daily.    SIMVASTATIN 20 MG Oral Tab TAKE 1 TABLET(20 MG) BY MOUTH EVERY EVENING    potassium chloride 20 MEQ Oral Tab CR Take 1 tablet (20 mEq total) by mouth daily.    Omeprazole 40 MG Oral Capsule Delayed Release Take 1 capsule (40 mg total) by mouth daily.    FLUoxetine 20 MG Oral Cap Take 1 capsule (20 mg total) by mouth daily.    Acetaminophen ER (ACETAMINOPHEN 8 HOUR) 650 MG Oral Tab CR Take 1-2 tablets (650-1,300 mg total) by mouth 2 (two) times daily.    allopurinol 300 MG Oral Tab Take 1 tablet (300 mg total) by mouth daily.    valsartan 80 MG Oral Tab Take 1 tablet (80 mg total) by mouth daily.    Magnesium Oxide 400 (240 Mg) MG Oral Tab Take 1 tablet (400 mg total) by mouth daily.     albuterol (2.5 MG/3ML) 0.083% Inhalation Nebu Soln Take 3 mL (2.5 mg total) by nebulization every 4 (four) hours as needed.    clobetasol 0.05 % External Cream Apply topically daily as needed.    FEROSUL 325 (65 Fe) MG Oral Tab Take 1 tablet (325 mg total) by mouth daily with breakfast.    Calcium Carb-Cholecalciferol (CALTRATE 600+D3 OR) Take 1 tablet by mouth daily.    TraMADol HCl 50 MG Oral Tab Take 1 tablet (50 mg total) by mouth every 8 (eight) hours as needed for Pain.     Medication Assessment:   Reviewed all medications in detail with patient including dose, indication, timing of administration, monitoring parameters, and potential side effects of medications.     Patient reports taking furosemide 20 mg daily, metoprolol succinate ER 50 mg daily and valsartan 80 mg daily as prescribed. She has not been monitoring her blood pressure because her machine broke. Did recommend she monitor her blood pressure 2-3 times weekly and bring readings in to all MD appointments for review.     Did provide education and stressed the importance of taking medication just like prescribed to get the most benefit. Patient denies forgetting or missing medication doses. Patient denies any questions or concerns at this time.     Thank you,    Leidy Alejandro, PharmD, 3/11/2024, 1:26 PMx

## 2024-03-12 ENCOUNTER — OFFICE VISIT (OUTPATIENT)
Dept: FAMILY MEDICINE CLINIC | Facility: CLINIC | Age: 89
End: 2024-03-12
Payer: MEDICARE

## 2024-03-12 ENCOUNTER — LAB ENCOUNTER (OUTPATIENT)
Dept: LAB | Age: 89
End: 2024-03-12
Attending: FAMILY MEDICINE
Payer: MEDICARE

## 2024-03-12 VITALS
BODY MASS INDEX: 31 KG/M2 | RESPIRATION RATE: 18 BRPM | WEIGHT: 179 LBS | SYSTOLIC BLOOD PRESSURE: 118 MMHG | OXYGEN SATURATION: 100 % | HEART RATE: 56 BPM | DIASTOLIC BLOOD PRESSURE: 52 MMHG

## 2024-03-12 DIAGNOSIS — E55.9 VITAMIN D DEFICIENCY: ICD-10-CM

## 2024-03-12 DIAGNOSIS — E03.9 ACQUIRED HYPOTHYROIDISM: ICD-10-CM

## 2024-03-12 DIAGNOSIS — J41.0 SIMPLE CHRONIC BRONCHITIS (HCC): ICD-10-CM

## 2024-03-12 DIAGNOSIS — E66.9 OBESITY (BMI 30.0-34.9): ICD-10-CM

## 2024-03-12 DIAGNOSIS — M17.0 PRIMARY OSTEOARTHRITIS OF BOTH KNEES: ICD-10-CM

## 2024-03-12 DIAGNOSIS — N18.32 STAGE 3B CHRONIC KIDNEY DISEASE (HCC): Chronic | ICD-10-CM

## 2024-03-12 DIAGNOSIS — I10 ESSENTIAL HYPERTENSION, BENIGN: ICD-10-CM

## 2024-03-12 DIAGNOSIS — M47.816 LUMBAR SPONDYLOSIS: ICD-10-CM

## 2024-03-12 DIAGNOSIS — K21.9 GASTROESOPHAGEAL REFLUX DISEASE WITHOUT ESOPHAGITIS: ICD-10-CM

## 2024-03-12 DIAGNOSIS — G62.9 NEUROPATHY: ICD-10-CM

## 2024-03-12 DIAGNOSIS — C34.12 SMALL CELL CARCINOMA OF UPPER LOBE OF LEFT LUNG (HCC): ICD-10-CM

## 2024-03-12 DIAGNOSIS — M06.09 RHEUMATOID ARTHRITIS OF MULTIPLE SITES WITH NEGATIVE RHEUMATOID FACTOR (HCC): ICD-10-CM

## 2024-03-12 DIAGNOSIS — E78.00 PURE HYPERCHOLESTEROLEMIA: Primary | ICD-10-CM

## 2024-03-12 DIAGNOSIS — Z98.890 HISTORY OF LUMBAR SURGERY: ICD-10-CM

## 2024-03-12 DIAGNOSIS — E78.00 PURE HYPERCHOLESTEROLEMIA: ICD-10-CM

## 2024-03-12 DIAGNOSIS — F33.8 SEASONAL DEPRESSION (HCC): ICD-10-CM

## 2024-03-12 DIAGNOSIS — L40.50 PSORIATIC ARTHRITIS (HCC): ICD-10-CM

## 2024-03-12 PROBLEM — M47.22 CERVICAL SPONDYLOSIS WITH RADICULOPATHY: Status: RESOLVED | Noted: 2018-01-08 | Resolved: 2024-03-12

## 2024-03-12 PROBLEM — M47.812 FACET ARTHRITIS OF CERVICAL REGION: Status: RESOLVED | Noted: 2017-04-05 | Resolved: 2024-03-12

## 2024-03-12 LAB
ALBUMIN SERPL-MCNC: 3.6 G/DL (ref 3.4–5)
ALBUMIN/GLOB SERPL: 1.1 {RATIO} (ref 1–2)
ALP LIVER SERPL-CCNC: 76 U/L
ALT SERPL-CCNC: 17 U/L
ANION GAP SERPL CALC-SCNC: 4 MMOL/L (ref 0–18)
AST SERPL-CCNC: 19 U/L (ref 15–37)
BILIRUB SERPL-MCNC: 0.4 MG/DL (ref 0.1–2)
BUN BLD-MCNC: 29 MG/DL (ref 9–23)
CALCIUM BLD-MCNC: 9.5 MG/DL (ref 8.5–10.1)
CHLORIDE SERPL-SCNC: 113 MMOL/L (ref 98–112)
CHOLEST SERPL-MCNC: 152 MG/DL (ref ?–200)
CO2 SERPL-SCNC: 23 MMOL/L (ref 21–32)
CREAT BLD-MCNC: 1.75 MG/DL
EGFRCR SERPLBLD CKD-EPI 2021: 28 ML/MIN/1.73M2 (ref 60–?)
FASTING PATIENT LIPID ANSWER: NO
FASTING STATUS PATIENT QL REPORTED: NO
GLOBULIN PLAS-MCNC: 3.3 G/DL (ref 2.8–4.4)
GLUCOSE BLD-MCNC: 99 MG/DL (ref 70–99)
HDLC SERPL-MCNC: 52 MG/DL (ref 40–59)
LDLC SERPL CALC-MCNC: 74 MG/DL (ref ?–100)
NONHDLC SERPL-MCNC: 100 MG/DL (ref ?–130)
OSMOLALITY SERPL CALC.SUM OF ELEC: 296 MOSM/KG (ref 275–295)
POTASSIUM SERPL-SCNC: 4.6 MMOL/L (ref 3.5–5.1)
PROT SERPL-MCNC: 6.9 G/DL (ref 6.4–8.2)
SODIUM SERPL-SCNC: 140 MMOL/L (ref 136–145)
T4 FREE SERPL-MCNC: 1.2 NG/DL (ref 0.8–1.7)
TRIGL SERPL-MCNC: 149 MG/DL (ref 30–149)
TSI SER-ACNC: 5.65 MIU/ML (ref 0.36–3.74)
VIT B12 SERPL-MCNC: 551 PG/ML (ref 193–986)
VIT D+METAB SERPL-MCNC: 37.3 NG/ML (ref 30–100)
VLDLC SERPL CALC-MCNC: 23 MG/DL (ref 0–30)

## 2024-03-12 PROCEDURE — 84443 ASSAY THYROID STIM HORMONE: CPT

## 2024-03-12 PROCEDURE — 36415 COLL VENOUS BLD VENIPUNCTURE: CPT

## 2024-03-12 PROCEDURE — 80053 COMPREHEN METABOLIC PANEL: CPT

## 2024-03-12 PROCEDURE — 82607 VITAMIN B-12: CPT

## 2024-03-12 PROCEDURE — 82306 VITAMIN D 25 HYDROXY: CPT

## 2024-03-12 PROCEDURE — 84439 ASSAY OF FREE THYROXINE: CPT

## 2024-03-12 PROCEDURE — 80061 LIPID PANEL: CPT

## 2024-03-12 RX ORDER — FLUOXETINE HYDROCHLORIDE 20 MG/1
20 CAPSULE ORAL DAILY
Qty: 90 CAPSULE | Refills: 1 | OUTPATIENT
Start: 2024-03-12

## 2024-03-12 RX ORDER — FLUOXETINE HYDROCHLORIDE 20 MG/1
20 CAPSULE ORAL DAILY
Qty: 90 CAPSULE | Refills: 1 | Status: SHIPPED | OUTPATIENT
Start: 2024-03-12

## 2024-03-12 NOTE — PROGRESS NOTES
REASON FOR VISIT:    Gillian Chávez is a 88 year old female who presents for a MA Supervisit, joints pain, knees pain.    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 Radiation -none . Moderate.No weakness.  No numbness or tingling. Worsening. Movement makes it worse and rest makes it better.  Injury:none, worse in the front of the joint.  No redness, bruising, lacerations.   Pt also has severe hands pain, sees Dr. Meeks, on Tramadol prn and Tylenol, not working.  Lumbar back pain is worsening, pt can not walk a lot.      Current Outpatient Medications   Medication Sig Dispense Refill    FLUoxetine 20 MG Oral Cap Take 1 capsule (20 mg total) by mouth daily. 90 capsule 1    apixaban (ELIQUIS) 5 MG Oral Tab TAKE 1 TABLET(5 MG) BY MOUTH TWICE DAILY 60 tablet 2    Nystatin (NYSTOP) 611282 UNIT/GM External Powder Apply topically every 6 (six) hours as needed.      ANORO ELLIPTA 62.5-25 MCG/ACT Inhalation Aerosol Powder, Breath Activated Inhale 1 puff into the lungs daily.      clobetasol 0.05 %  External Solution Apply 1 Application topically daily as needed (to scalp).      cholecalciferol 25 MCG (1000 UT) Oral Tab Take 1 tablet (1,000 Units total) by mouth daily.      SYNTHROID 50 MCG Oral Tab TAKE 1 TABLET(50 MCG) BY MOUTH DAILY 90 tablet 1    furosemide 20 MG Oral Tab Take 1 tablet (20 mg total) by mouth daily. 90 tablet 0    folic acid 1 MG Oral Tab TAKE 1 TABLET BY MOUTH EVERY DAY 90 tablet 0    metoprolol succinate ER 50 MG Oral Tablet 24 Hr Take 1 tablet (50 mg total) by mouth daily. 90 tablet 1    SIMVASTATIN 20 MG Oral Tab TAKE 1 TABLET(20 MG) BY MOUTH EVERY EVENING 90 tablet 0    potassium chloride 20 MEQ Oral Tab CR Take 1 tablet (20 mEq total) by mouth daily. 90 tablet 1    Omeprazole 40 MG Oral Capsule Delayed Release Take 1 capsule (40 mg total) by mouth daily. 90 capsule 1    Acetaminophen ER (ACETAMINOPHEN 8 HOUR) 650 MG Oral Tab CR Take 1-2 tablets (650-1,300 mg total) by mouth 2 (two) times daily.      allopurinol 300 MG Oral Tab Take 1 tablet (300 mg total) by mouth daily.      valsartan 80 MG Oral Tab Take 1 tablet (80 mg total) by mouth daily.      Magnesium Oxide 400 (240 Mg) MG Oral Tab Take 1 tablet (400 mg total) by mouth daily.      albuterol (2.5 MG/3ML) 0.083% Inhalation Nebu Soln Take 3 mL (2.5 mg total) by nebulization every 4 (four) hours as needed.      clobetasol 0.05 % External Cream Apply topically daily as needed.      FEROSUL 325 (65 Fe) MG Oral Tab Take 1 tablet (325 mg total) by mouth daily with breakfast.      PREVIDENT 5000 PLUS 1.1 % Dental Cream       Calcium Carb-Cholecalciferol (CALTRATE 600+D3 OR) Take 1 tablet by mouth daily.      TraMADol HCl 50 MG Oral Tab Take 1 tablet (50 mg total) by mouth every 8 (eight) hours as needed for Pain.  0           Latest Ref Rng & Units 2/13/2024     2:16 PM 3/28/2023     1:20 PM 11/1/2022    10:56 AM 8/30/2022    11:15 AM 7/18/2022     3:57 PM 5/24/2022    11:26 AM 5/3/2022    11:00 AM   Glucose and HbA1c   Glucose 70 - 99  mg/dL 93  82   150  116  157  129    HbA1c 4.3 - 5.6 %   5.8              Latest Ref Rng & Units 5/25/2021     9:33 AM 7/17/2020     9:39 AM 6/20/2019     9:32 AM 4/2/2018     9:30 AM 4/5/2017    10:24 AM 4/15/2016    11:28 AM 3/6/2015    11:29 AM   Cholesterol   Total Cholesterol <200 mg/dL 193  210  154  163  144  157  148    Triglycerides 30 - 149 mg/dL 231  169  126  363  219  282  238    HDL 40 - 59 mg/dL 54  88  72  43  55  52  43    LDL <100 mg/dL 93  88  57  47  45  49  57          Latest Ref Rng & Units 2/13/2024     2:16 PM 3/28/2023     1:20 PM 8/30/2022    11:15 AM 7/18/2022     3:57 PM 5/24/2022    11:26 AM 5/3/2022    11:00 AM 4/12/2022    10:12 AM   BUN and Cr   BUN 9 - 23 mg/dL 34  25  48  27  26  28  53    Creatinine 0.55 - 1.02 mg/dL 1.32  1.41  1.70  1.22  1.53  1.50  1.61          Latest Ref Rng & Units 2/13/2024     2:16 PM 3/28/2023     1:20 PM 8/30/2022    11:15 AM 7/18/2022     3:57 PM 5/24/2022    11:26 AM 5/3/2022    11:00 AM 4/12/2022    10:12 AM   AST and ALT   AST (SGOT) 15 - 37 U/L 15  18  15  18  20  16  15    ALT (SGPT) 13 - 56 U/L 21  17  19  15  16  19  20          Latest Ref Rng & Units 5/25/2021     9:33 AM 7/17/2020     9:39 AM 6/20/2019     9:32 AM 4/2/2018     9:30 AM 4/5/2017    10:24 AM 4/15/2016    11:28 AM 3/6/2015    11:29 AM   TSH and Free T4   TSH 0.358 - 3.740 mIU/mL 3.020  0.497  2.010  1.110  1.550  1.500  1.980         General Health      In the past six months, have you lost more than 10 pounds without trying?: 2 - No  Has your appetite been poor?: No  Type of Diet: Balanced  How would you describe your daily physical activity?: None  How do you maintain positive mental well-being?: Social Interaction;Puzzles;Games  Have you had any immunizations at another office such as Influenza, Hepatitis B, Tetanus, or Pneumococcal?: Yes     Functional Ability     Bathing or Showering: Able without help  Toileting: Able without help  Dressing: Able without help  Eating: Able  without help  Driving: Able without help  Preparing your meals: Able without help  Managing money/bills: Able without help  Taking medications as prescribed: Able without help  Are you able to afford your medications?: Yes  Hearing Problems?: Yes     Functional Status     Hearing Problems?: Yes  Vision Problems? : No  Difficulty walking?: Yes  Difficulty dressing or bathing?: Yes  Problems with daily activities? : Yes  Memory Problems?: No      Fall/Risk Assessment            flow sheet     Depression Screening (PHQ-2/PHQ-9): Over the LAST 2 WEEKS         Depression Screening (PHQ-2/PHQ-9): Over the LAST 2 WEEKS   Little interest or pleasure in doing things: More than half the days    Feeling down, depressed, or hopeless: Several days    PHQ-2 SCORE: 3           Advance Directives     Do you have a healthcare power of ?: Yes  Do you have a living will?: Yes     Cognitive Assessment     What day of the week is this?: Correct  What month is it?: Correct  What year is it?: Correct  Recall \"Ball\": Correct  Recall \"Flag\": Correct  Recall \"Tree\": Correct         PREVENTATIVE SERVICES   INDICATIONS AND SCHEDULE Internal Lab or Procedure   Diabetes Screening     HbgA1C   Annually HEMOGLOBIN A1C (%)   Date Value   11/01/2022 5.8 (A)       Fasting Blood Sugar (FSB)Annually Glucose (mg/dL)   Date Value   02/13/2024 93   04/15/2021 110 (H)   12/01/2015 130 (H)     GLUCOSE (mg/dL)   Date Value   04/17/2014 115 (H)   07/22/2011 109 (H)      Cardiovascular Disease Screening    LDL Annually LDL Cholesterol (mg/dL)   Date Value   05/25/2021 93     LDL-CHOLESTEROL (mg/dL (calc))   Date Value   07/22/2011 66     LDL CHOLESTROL (mg/dL)   Date Value   04/17/2014 81       EKG - w/ Initial Preventative Physical Exam only, or if medically necessary yes   Colorectal Cancer Screening     Colonoscopy Screen every 10 years No recommendations at this time    Flex Sigmoidoscopy Screen every 5 years No results found for this or any  previous visit.    Fecal Occult Blood Annually No results found for: \"FOB\", \"OCCULTSTOOL\"   Glaucoma Screening     Ophthalmology Visit Annually yes   Immunizations     Zoster (Not covered by Medicare Part B) No orders found for this or any previous visit.     SPECIFIC DISEASE MONITORING Internal Lab or Procedure     Annual Monitoring of Persistent Medications  (ACE/ARB, Digoxin, Diuretics)        Potassium  Annually Potassium (mmol/L)   Date Value   02/13/2024 4.6   04/15/2021 4.41   12/01/2015 4.2       Creatinine  Annually Creatinine, Serum (mg/dL)   Date Value   12/01/2015 0.95     Creatinine (mg/dL)   Date Value   02/13/2024 1.32 (H)   04/15/2021 1.20 (H)       Digoxin Serum Conc  Annually No results found for: \"DIGOXIN\"     COPD     Spirometry Testing Annually No results found for this or any previous visit.          ALLERGIES:     Allergies   Allergen Reactions    Diazepam OTHER (SEE COMMENTS)     Other reaction(s): Other    Amoxil RASH     TABS    Augmentin, [Amoxicillin-Pot Clavulanate] UNKNOWN    Cephalosporins      hives    Penicillins      hives    Codeine RASH     MEDICAL INFORMATION:   Past Medical History:   Diagnosis Date    Arthritis     psoriatic    Back problem     Bladder cancer (HCC) 9/2/15    TURBT - Dr. Modi    Disorder of thyroid     High blood pressure     High cholesterol     Hypothyroid     Incontinence of urine     Lung cancer (HCC)     Small cell lung ca    Osteoarthritis     Other and unspecified hyperlipidemia     Unspecified essential hypertension       Past Surgical History:   Procedure Laterality Date    ANESTH,KNEE AREA SURGERY      arthrocentesis aspiration of knee joint    CATARACT      COLONOSCOPY  2021    CYSTOURETHROSCOPY,FULGUR 2-5CM LESN Right 9/2/2015    Procedure: CYSTOSCOPY WITH RETROGRADE PYELOGRAM;  Surgeon: Pranay Modi MD;  Location: [facility]    CYSTOURETHROSCOPY,FULGUR <.5CM LESN N/A 12/28/2015    Procedure: CYSTOSCOPY TRANSURETHRAL RESECTION OF  BLADDER TUMOR W/ MITOMYCIN;  Surgeon: Pranay Modi MD;  Location: [facility]    HYSTERECTOMY  1/1/74    ZELALEM-BSO    INSTILL ANTICANCER AGENT IN BLADDER Right 12/28/2015    Procedure: CYSTOSCOPY WITH RETROGRADE PYELOGRAM;  Surgeon: Pranay Modi MD;  Location: [facility]    MITOMYCIN 5 MG INJ N/A 12/28/2015    Procedure: CYSTOSCOPY TRANSURETHRAL RESECTION OF BLADDER TUMOR W/ MITOMYCIN;  Surgeon: Pranay Modi MD;  Location: [facility]    OOPHORECTOMY  1/1/1974    OTHER  1/1/99    gastrotomy with suture of bleeding ulcer    OTHER SURGICAL HISTORY  12/1/15    BTS Cysto - Dr. Modi    OTHER SURGICAL HISTORY  4/19/16    BTS CystoJames Modi    OTHER SURGICAL HISTORY  7/12/16    BTS CystoJames Modi    OTHER SURGICAL HISTORY  07/25/2017    BTS cysto Dr Modi    OTHER SURGICAL HISTORY  01/08/2019    BTS CystoJames Modi    OTHER SURGICAL HISTORY      neck surgery    OTHER SURGICAL HISTORY  01/21/2020    Cysto Dr. Modi     OTHER SURGICAL HISTORY      Lung      Family History   Problem Relation Age of Onset    Cancer Father         liver    Diabetes Father     Heart Disorder Mother     Diabetes Mother     Diabetes Sister     Heart Disorder Sister     Diabetes Brother     Cancer Maternal Grandmother         ? colon cancer     Immunization History      Immunization History  Administered            Date(s) Administered    >=3 YRS FLUZONE OR FLUARIX QUAD PRESERVE FREE SINGLE DOSE (52643) FLU CLINIC                          10/07/2016      Covid-19 Vaccine Pfizer 30 mcg/0.3 ml                          01/22/2021 02/12/2021 09/29/2021 04/13/2022      Covid-19 Vaccine Pfizer Beny-Sucrose 30 mcg/0.3 ml                          04/12/2022      FLU VAC High Dose 65 YRS & Older PRSV Free (27383)                          09/18/2014 09/04/2019 09/17/2020 09/06/2023      FLU VAC QIV SPLIT 3 YRS AND OLDER (76325)                           2019      FLUAD High Dose 65 yr and older (19591)                          2018      FLUZONE 6 months and older PFS 0.5 ml (68228)                          10/07/2016      Fluvirin, 3 Years & >, Im                          10/01/2012  2013  2014      Fluzone Vaccine Medicare ()                          10/20/2015  10/03/2017  2020      HEP A                 2011      HEP A/HEP B Combined  2011      HEP B                 2011      HIGH DOSE FLU 65 YRS AND OLDER PRSV FREE SINGLE D (36361) FLU CLINIC                          2020      Influenza             10/15/2007  2008  2009                            2010  2011  2013                            10/20/2015  10/03/2017  2018                            2020  2021  2022      Pneumococcal (Prevnar 13)                          04/15/2016      Pneumococcal Conjugate PCV20                          04/10/2023      Pneumovax 23          2018      RSV, recombinant, RSVpreF, adjuvanted (Arexvy)                          2023      TDAP                  2011      Td                    2011      Zoster Vaccine Recombinant Adjuvanted (Shingrix)                          2022        SOCIAL HISTORY:   Social History     Socioeconomic History    Marital status:     Number of children: 2   Occupational History    Occupation: RETIRED     Tobacco Use    Smoking status: Former     Packs/day: 1.00     Years: 44.00     Additional pack years: 0.00     Total pack years: 44.00     Types: Cigarettes     Start date: 1957     Quit date: 2001     Years since quittin.1    Smokeless tobacco: Never   Vaping Use    Vaping Use: Never used   Substance and Sexual Activity    Alcohol use: Not Currently     Alcohol/week: 2.0 standard drinks of alcohol     Types: 2 Glasses of  wine per week     Comment: daily - none recently    Drug use: No    Sexual activity: Not Currently     Partners: Male     Birth control/protection: Hysterectomy   Other Topics Concern    Caffeine Concern Yes     Comment: coffee 1 cup daily    Exercise No    Seat Belt Yes   Social History Narrative    , lives alone    Has 1 son - in [place]    Has 1 daughter - lives close by        REVIEW OF SYSTEMS:   GENERAL: feels well otherwise  SKIN: denies any unusual skin lesions  EYES: denies blurred vision or double vision  HEENT: denies nasal congestion, sinus pain or ST  LUNGS: denies shortness of breath with exertion  CARDIOVASCULAR: denies chest pain on exertion  GI: denies abdominal pain, denies heartburn  : denies dysuria, vaginal discharge or itching  MUSCULOSKELETAL: denies back pain  NEURO: denies headaches  PSYCHE: denies depression or anxiety  HEMATOLOGIC: denies hx of anemia  ENDOCRINE: denies thyroid history  ALL/ASTHMA: denies hx of allergy or asthma    EXAM:   /52   Pulse 56   Resp 18   Wt 179 lb (81.2 kg)   SpO2 100%   BMI 30.71 kg/m²    >   BP Readings from Last 3 Encounters:   03/12/24 118/52   02/13/24 130/73   09/06/23 139/41     GENERAL: well developed, well nourished, in no apparent distress, obese  SKIN: no rashes, no suspicious lesions  HEENT: atraumatic, normocephalic, ears and throat are clear                Hearing Assessed via: Whispered Voice  EYES: PERRLA, EOMI, conjunctiva are clear normal optic disc  NECK: supple, no adenopathy, no bruits  CHEST: no chest tenderness  BREAST:deferred   LUNGS: clear to auscultation  CARDIO: RRR without murmur  GI: good BS's, no masses, HSM or tenderness  : deferred  RECTAL: deferred  MUSCULOSKELETAL: arthritic changes of both hands.  EXTREMITIES: no cyanosis, clubbing or edema  NEURO: Oriented times three, cranial nerves are intact, motor and sensory are grossly intact      ASSESSMENT AND PLAN OF CHRONIC CONDITIONS:   Gillian Chávez is a 88  year old female who presents for a Medicare Assessment.     Primary osteoarthritis of both knees : referral to ortho for injections    Small cell carcinoma of upper lobe of left lung: doing great, s/p chemo.Will see Dr. Thayer.      Vitamin D deficiency  Stable, cont. supplements and proper diet, recheck the level   -     VITAMIN D, 25-HYDROXY; Future    Psoriatic arthritis (HCC) and Rheumatoid arthritis of multiple sites with negative rheumatoid factor (HCC) : pt will see Dr. Gomez now.Worsening.    Pure hypercholesterolemia  -     LIPID PANEL; Future  -     COMP METABOLIC PANEL (14); Future   low sugar, low salt and  lipid diet, exercise 3 times a week d/w the pt.    Acquired hypothyroidism: stable  -     ASSAY, THYROID STIM HORMONE; Future    Essential hypertension, benign: doing well, cont. Present meds.cont. low salt diet.      Lumbar spondylosis  History of lumbar surgery  -     Tramadol , Tylenol prn.  Pt sees pain doctor and will have pain pump inserted.      Gastroesophageal reflux disease without esophagitis: low acid diet.Doing well.    Seasonal depression (HCC): doing very well.cont. antidepressant.    Simple chronic bronchitis (HCC): stable, cont. Present meds.referral to Dr. Thayer.      Stage 3 chronic kidney disease 3b CKD (HCC): CMP, stable.No NSAIDs.Sees nephrologist.       Obesity: stable   low sugar, low salt and  lipid diet, exercise 3 times a week d/w the pt.  The patient indicates understanding of these issues and agrees to the plan.  The patient is asked to return in 2 months for f/u  Diet counseling perfomed    SUGGESTED VACCINATIONS - Influenza, Pneumococcal, Zoster, Tetanus   Influenza: No recommendations at this time  Pneumonia: No recommendations at this time

## 2024-03-12 NOTE — PATIENT INSTRUCTIONS
Brian Thayer MD  Pulmonology.  Address: [address] [place] [address]  [facility], [place] [address]  Phone: [phone]         Dr. Suyapa Merino   Rheumatology      [address]  [facility] [address], [facility] [address]  [place] [address]  Phone: [phone]  Fax: [phone]          [address]  [facility] [address]  [place] [address]  Phone: [phone]  Fax: [phone]       Jair Fairchild PA-C   Orthopaedics.  Dr. Fadi MAYER.    Dr. Murray office every Tuesday.    [address], [place] [address]  Tel:[phone]  FAX: [phone]

## 2024-03-14 ENCOUNTER — TELEPHONE (OUTPATIENT)
Dept: FAMILY MEDICINE CLINIC | Facility: CLINIC | Age: 89
End: 2024-03-14

## 2024-03-14 DIAGNOSIS — E03.9 ACQUIRED HYPOTHYROIDISM: Primary | ICD-10-CM

## 2024-03-14 NOTE — TELEPHONE ENCOUNTER
----- Message from Ree Murray MD sent at 3/13/2024  2:50 PM CDT -----  Overall stable, ok to send Synthroid 75ug every morning if pt on 50, thyroid is not controlled, TSH in 6 weeks.

## 2024-03-19 RX ORDER — SIMVASTATIN 20 MG
20 TABLET ORAL EVERY EVENING
Qty: 90 TABLET | Refills: 0 | Status: SHIPPED | OUTPATIENT
Start: 2024-03-19 | End: 2024-06-10

## 2024-03-20 ENCOUNTER — TELEPHONE (OUTPATIENT)
Dept: FAMILY MEDICINE CLINIC | Facility: CLINIC | Age: 89
End: 2024-03-20

## 2024-03-25 RX ORDER — LEVOTHYROXINE SODIUM 75 MCG
75 TABLET ORAL
Qty: 90 TABLET | Refills: 0 | Status: SHIPPED | OUTPATIENT
Start: 2024-03-25

## 2024-03-25 NOTE — TELEPHONE ENCOUNTER
Notified the patient of the below lab results and orders. Patient verbalized understanding. Patient states that she takes the Synthroid 50mcg \"religiously.\" Will start new dose and schedule lab draw for the first week of May. Answered all questions at this time.   Lab order placed.   Rx sent.

## 2024-04-02 RX ORDER — OMEPRAZOLE 40 MG/1
40 CAPSULE, DELAYED RELEASE ORAL DAILY
Qty: 90 CAPSULE | Refills: 1 | Status: SHIPPED | OUTPATIENT
Start: 2024-04-02

## 2024-04-08 ENCOUNTER — OFFICE VISIT (OUTPATIENT)
Dept: FAMILY MEDICINE CLINIC | Facility: CLINIC | Age: 89
End: 2024-04-08
Payer: MEDICARE

## 2024-04-08 VITALS
SYSTOLIC BLOOD PRESSURE: 120 MMHG | BODY MASS INDEX: 30.56 KG/M2 | HEIGHT: 64 IN | DIASTOLIC BLOOD PRESSURE: 40 MMHG | RESPIRATION RATE: 20 BRPM | OXYGEN SATURATION: 98 % | WEIGHT: 179 LBS | HEART RATE: 64 BPM

## 2024-04-08 DIAGNOSIS — I10 ESSENTIAL HYPERTENSION, BENIGN: ICD-10-CM

## 2024-04-08 DIAGNOSIS — E78.00 PURE HYPERCHOLESTEROLEMIA: ICD-10-CM

## 2024-04-08 DIAGNOSIS — Z01.818 PREOPERATIVE GENERAL PHYSICAL EXAMINATION: Primary | ICD-10-CM

## 2024-04-08 DIAGNOSIS — E03.9 ACQUIRED HYPOTHYROIDISM: ICD-10-CM

## 2024-04-08 PROCEDURE — 99214 OFFICE O/P EST MOD 30 MIN: CPT | Performed by: NURSE PRACTITIONER

## 2024-04-08 NOTE — PROGRESS NOTES
CC: Preop exam.    Gillian Chávez is a 88 year old female who presents for a pre-operative physical exam  By Dr. Leticia tomlin. Patient is to have spinal cord  stimulator placed  to be on 4/22/24   No prior anaesthesia problem.      Current Outpatient Medications   Medication Sig Dispense Refill    Omeprazole 40 MG Oral Capsule Delayed Release Take 1 capsule (40 mg total) by mouth daily. 90 capsule 1    SYNTHROID 75 MCG Oral Tab Take 1 tablet (75 mcg total) by mouth before breakfast. 90 tablet 0    simvastatin 20 MG Oral Tab Take 1 tablet (20 mg total) by mouth every evening. 90 tablet 0    FLUoxetine 20 MG Oral Cap Take 1 capsule (20 mg total) by mouth daily. 90 capsule 1    apixaban (ELIQUIS) 5 MG Oral Tab TAKE 1 TABLET(5 MG) BY MOUTH TWICE DAILY 60 tablet 2    Nystatin (NYSTOP) 309770 UNIT/GM External Powder Apply topically every 6 (six) hours as needed.      ANORO ELLIPTA 62.5-25 MCG/ACT Inhalation Aerosol Powder, Breath Activated Inhale 1 puff into the lungs daily.      clobetasol 0.05 % External Solution Apply 1 Application topically daily as needed (to scalp).      cholecalciferol 25 MCG (1000 UT) Oral Tab Take 1 tablet (1,000 Units total) by mouth daily.      furosemide 20 MG Oral Tab Take 1 tablet (20 mg total) by mouth daily. 90 tablet 0    folic acid 1 MG Oral Tab TAKE 1 TABLET BY MOUTH EVERY DAY 90 tablet 0    metoprolol succinate ER 50 MG Oral Tablet 24 Hr Take 1 tablet (50 mg total) by mouth daily. 90 tablet 1    potassium chloride 20 MEQ Oral Tab CR Take 1 tablet (20 mEq total) by mouth daily. 90 tablet 1    Acetaminophen ER (ACETAMINOPHEN 8 HOUR) 650 MG Oral Tab CR Take 1-2 tablets (650-1,300 mg total) by mouth 2 (two) times daily.      allopurinol 300 MG Oral Tab Take 1 tablet (300 mg total) by mouth daily.      valsartan 80 MG Oral Tab Take 1 tablet (80 mg total) by mouth daily.      Magnesium Oxide 400 (240 Mg) MG Oral Tab Take 1 tablet (400 mg total) by mouth daily.      albuterol (2.5 MG/3ML)  0.083% Inhalation Nebu Soln Take 3 mL (2.5 mg total) by nebulization every 4 (four) hours as needed.      clobetasol 0.05 % External Cream Apply topically daily as needed.      FEROSUL 325 (65 Fe) MG Oral Tab Take 1 tablet (325 mg total) by mouth daily with breakfast.      PREVIDENT 5000 PLUS 1.1 % Dental Cream       Calcium Carb-Cholecalciferol (CALTRATE 600+D3 OR) Take 1 tablet by mouth daily.      TraMADol HCl 50 MG Oral Tab Take 1 tablet (50 mg total) by mouth every 8 (eight) hours as needed for Pain.  0      Allergies:   Allergies   Allergen Reactions    Diazepam OTHER (SEE COMMENTS)     Other reaction(s): Other    Amoxil RASH     TABS    Augmentin, [Amoxicillin-Pot Clavulanate] UNKNOWN    Cephalosporins      hives    Penicillins      hives    Codeine RASH      Past Medical History:   Diagnosis Date    Arthritis     psoriatic    Back problem     Bladder cancer (HCC) 9/2/15    TURBT - Dr. Modi    Disorder of thyroid     High blood pressure     High cholesterol     Hypothyroid     Incontinence of urine     Lung cancer (HCC)     Small cell lung ca    Osteoarthritis     Other and unspecified hyperlipidemia     Unspecified essential hypertension       Past Surgical History:   Procedure Laterality Date    ANESTH,KNEE AREA SURGERY      arthrocentesis aspiration of knee joint    CATARACT      COLONOSCOPY  2021    CYSTOURETHROSCOPY,FULGUR 2-5CM LESN Right 9/2/2015    Procedure: CYSTOSCOPY WITH RETROGRADE PYELOGRAM;  Surgeon: Pranay Modi MD;  Location: [facility]    CYSTOURETHROSCOPY,FULGUR <.5CM LESN N/A 12/28/2015    Procedure: CYSTOSCOPY TRANSURETHRAL RESECTION OF BLADDER TUMOR W/ MITOMYCIN;  Surgeon: Pranay Modi MD;  Location: [facility]    HYSTERECTOMY  1/1/74    ZELALEM-BSO    INSTILL ANTICANCER AGENT IN BLADDER Right 12/28/2015    Procedure: CYSTOSCOPY WITH RETROGRADE PYELOGRAM;  Surgeon: Pranay Modi MD;  Location: [facility]    MITOMYCIN 5 MG INJ N/A 12/28/2015     Procedure: CYSTOSCOPY TRANSURETHRAL RESECTION OF BLADDER TUMOR W/ MITOMYCIN;  Surgeon: Pranay Modi MD;  Location: [facility]    OOPHORECTOMY  1974    OTHER  99    gastrotomy with suture of bleeding ulcer    OTHER SURGICAL HISTORY  12/1/15    S Chno Modi    OTHER SURGICAL HISTORY  16    BTS Hector Modi    OTHER SURGICAL HISTORY  16    BTS Hector Modi    OTHER SURGICAL HISTORY  2017    BTS chon Modi    OTHER SURGICAL HISTORY  2019    S Hector Modi    OTHER SURGICAL HISTORY      neck surgery    OTHER SURGICAL HISTORY  2020    Chon Modi     OTHER SURGICAL HISTORY      Lung      Family History   Problem Relation Age of Onset    Cancer Father         liver    Diabetes Father     Heart Disorder Mother     Diabetes Mother     Diabetes Sister     Heart Disorder Sister     Diabetes Brother     Cancer Maternal Grandmother         ? colon cancer      Social History:   Social History     Socioeconomic History    Marital status:     Number of children: 2   Occupational History    Occupation: RETIRED     Tobacco Use    Smoking status: Former     Packs/day: 1.00     Years: 44.00     Additional pack years: 0.00     Total pack years: 44.00     Types: Cigarettes     Start date: 1957     Quit date: 2001     Years since quittin.2    Smokeless tobacco: Never   Vaping Use    Vaping Use: Never used   Substance and Sexual Activity    Alcohol use: Not Currently     Alcohol/week: 2.0 standard drinks of alcohol     Types: 2 Glasses of wine per week     Comment: daily - none recently    Drug use: No    Sexual activity: Not Currently     Partners: Male     Birth control/protection: Hysterectomy   Other Topics Concern    Caffeine Concern Yes     Comment: coffee 1 cup daily    Exercise No    Seat Belt Yes   Social History Narrative    , lives alone    Has 1 son - in [place]    Has 1 daughter - lives close by          REVIEW OF SYSTEMS:   GENERAL: feels well otherwise  SKIN: denies any unusual skin lesions  EYES:denies blurred vision or double vision  HEENT: denies nasal congestion, sinus pain or ST  LUNGS: denies shortness of breath with exertion  CARDIOVASCULAR: denies chest pain on exertion  GI: denies abdominal pain,denies heartburn  : no abnormal discharge  MUSCULOSKELETAL: denies back pain  NEURO: denies headaches  PSYCHE: denies depression or anxiety  HEMATOLOGIC: denies hx of anemia  ENDOCRINE: denies thyroid history  ALL/ASTHMA: denies hx of allergy or asthma    EXAM:   /40   Pulse 64   Resp 20   Ht 5' 4\" (1.626 m)   Wt 179 lb (81.2 kg)   SpO2 98%   BMI 30.73 kg/m²   GENERAL: well developed, well nourished,in no apparent distress  SKIN: no rashes,no suspicious lesions  HEENT: atraumatic, normocephalic,ears and throat are clear  EYES:PERRLA, EOMI, normal optic disk,conjunctiva are clear  NECK: supple,no adenopathy,no bruits  CHEST: no chest tenderness  LUNGS: clear to auscultation  CARDIO: RRR without murmur  GI: good BS's,no masses, HSM or tenderness  MUSCULOSKELETAL: back is tender, Limited ROM of the back due to pain   EXTREMITIES: no cyanosis, clubbing or edema  NEURO: Oriented times three,cranial nerves are intact,motor and sensory are grossly intact    ASSESSMENT AND PLAN:   Gillian Chávez is a 88 year old female who presents for a pre-operative physical exam. Labs stable, EKG SB , HR 49 ,  patient is approved for surgery.   Diagnoses and all orders for this visit:    Preoperative general physical examination    Acquired hypothyroidism    Pure hypercholesterolemia    Essential hypertension, benign  Stable   Patient approved for surgery

## 2024-04-15 RX ORDER — POTASSIUM CHLORIDE 1500 MG/1
1 TABLET, EXTENDED RELEASE ORAL DAILY
Qty: 90 TABLET | Refills: 0 | Status: SHIPPED | OUTPATIENT
Start: 2024-04-15

## 2024-05-08 DIAGNOSIS — M17.11 PRIMARY OSTEOARTHRITIS OF RIGHT KNEE: ICD-10-CM

## 2024-05-08 RX ORDER — METOPROLOL SUCCINATE 50 MG/1
50 TABLET, EXTENDED RELEASE ORAL DAILY
Qty: 90 TABLET | Refills: 0 | Status: SHIPPED | OUTPATIENT
Start: 2024-05-08

## 2024-05-25 NOTE — TELEPHONE ENCOUNTER
Medication(s) to Refill:   Requested Prescriptions     Pending Prescriptions Disp Refills    FOLIC ACID 1 MG Oral Tab [Pharmacy Med Name: FOLIC ACID 1MG TABLETS] 90 tablet 0     Sig: TAKE 1 TABLET BY MOUTH EVERY DAY         Reason for Medication Refill being sent to Provider / Reason Protocol Failed:  [] 90 day refill has already been granted  [] Blood Pressure out of range  [] Labs Abnormal/over due  [] Medication not previously prescribed by Provider  [] Non-Protocol Medication  [] Controlled Substance   [] Due for appointment- no future appointment scheduled  [] No Follow up specified      Last Time Medication was Filled:  2/13/24      Last Office Visit with PCP: 4/8/24    When Patient was Due Back to the Office:    (from when PCP last addressed condition)    Future Appointments:  Future Appointments   Date Time Provider Department Center   8/13/2024  1:30 PM Yanira Rousseau MD PF HEM [facility] [place]         Last Blood Pressures:  BP Readings from Last 2 Encounters:   04/08/24 120/40   03/12/24 118/52       Action taken:  [] Refill approved per protocol  [] Routing to provider for approval

## 2024-05-27 RX ORDER — FOLIC ACID 1 MG/1
TABLET ORAL
Qty: 90 TABLET | Refills: 0 | Status: SHIPPED | OUTPATIENT
Start: 2024-05-27

## 2024-05-31 ENCOUNTER — LAB ENCOUNTER (OUTPATIENT)
Dept: LAB | Age: 89
End: 2024-05-31
Attending: FAMILY MEDICINE
Payer: MEDICARE

## 2024-05-31 DIAGNOSIS — E03.9 ACQUIRED HYPOTHYROIDISM: ICD-10-CM

## 2024-05-31 LAB
T4 FREE SERPL-MCNC: 1.1 NG/DL (ref 0.8–1.7)
TSI SER-ACNC: 2.19 MIU/ML (ref 0.36–3.74)

## 2024-05-31 PROCEDURE — 36415 COLL VENOUS BLD VENIPUNCTURE: CPT

## 2024-05-31 PROCEDURE — 84443 ASSAY THYROID STIM HORMONE: CPT

## 2024-05-31 PROCEDURE — 84439 ASSAY OF FREE THYROXINE: CPT

## 2024-06-10 RX ORDER — SIMVASTATIN 20 MG
20 TABLET ORAL EVERY EVENING
Qty: 90 TABLET | Refills: 0 | Status: SHIPPED | OUTPATIENT
Start: 2024-06-10

## 2024-06-14 RX ORDER — APIXABAN 5 MG/1
5 TABLET, FILM COATED ORAL 2 TIMES DAILY
Qty: 60 TABLET | Refills: 2 | Status: SHIPPED | OUTPATIENT
Start: 2024-06-14 | End: 2024-09-16

## 2024-06-18 ENCOUNTER — TELEPHONE (OUTPATIENT)
Dept: HEMATOLOGY/ONCOLOGY | Age: 89
End: 2024-06-18

## 2024-06-18 NOTE — TELEPHONE ENCOUNTER
Patient's daughter called needing extension of Family Medical Leave Act.  Previous form in 1/08/24 TE;  same details, 6/15/24 - 12/15/24

## 2024-06-18 NOTE — TELEPHONE ENCOUNTER
Patient called and was able to obtain Release of Information forms from the doctors office and she will be faxing them over tomorrow.

## 2024-06-20 NOTE — TELEPHONE ENCOUNTER
Family Medical Leave Act Revision faxed to [employer] @ [phone] Calling patient's daughter Haily to adv. Archiving forms.

## 2024-06-20 NOTE — TELEPHONE ENCOUNTER
Called Daughter Haily( who's forms are being requested for) Left message to call back, patient's daughter had the forms (which were already completed and signed and dated w/ last years date on it) we are unable to use this to complete for this years request or re-certification for another 6 months..

## 2024-06-20 NOTE — TELEPHONE ENCOUNTER
Patient daughter called back, states Family Medical Leave Act company will accept as a revision/extension and is not sending new form. Informed patient I would confer with supervision and call her back to confirm whether we can use same form.

## 2024-06-20 NOTE — TELEPHONE ENCOUNTER
Family Medical Leave Act forms revised for Daughter Haily to [facility], pending confirmation

## 2024-06-21 DIAGNOSIS — E03.9 ACQUIRED HYPOTHYROIDISM: ICD-10-CM

## 2024-06-21 RX ORDER — LEVOTHYROXINE SODIUM 75 MCG
75 TABLET ORAL
Qty: 90 TABLET | Refills: 0 | Status: SHIPPED | OUTPATIENT
Start: 2024-06-21

## 2024-07-15 ENCOUNTER — OFFICE VISIT (OUTPATIENT)
Dept: FAMILY MEDICINE CLINIC | Facility: CLINIC | Age: 89
End: 2024-07-15
Payer: MEDICARE

## 2024-07-15 ENCOUNTER — LAB ENCOUNTER (OUTPATIENT)
Dept: LAB | Age: 89
End: 2024-07-15
Attending: FAMILY MEDICINE
Payer: MEDICARE

## 2024-07-15 VITALS
SYSTOLIC BLOOD PRESSURE: 124 MMHG | BODY MASS INDEX: 36.02 KG/M2 | DIASTOLIC BLOOD PRESSURE: 46 MMHG | HEIGHT: 64 IN | WEIGHT: 211 LBS | HEART RATE: 66 BPM | OXYGEN SATURATION: 99 % | RESPIRATION RATE: 18 BRPM

## 2024-07-15 DIAGNOSIS — L29.9 PRURITUS: ICD-10-CM

## 2024-07-15 DIAGNOSIS — R53.83 OTHER FATIGUE: ICD-10-CM

## 2024-07-15 DIAGNOSIS — C34.12 SMALL CELL CARCINOMA OF UPPER LOBE OF LEFT LUNG (HCC): ICD-10-CM

## 2024-07-15 DIAGNOSIS — E03.9 ACQUIRED HYPOTHYROIDISM: ICD-10-CM

## 2024-07-15 DIAGNOSIS — C34.92 SQUAMOUS CELL CARCINOMA OF LEFT LUNG (HCC): ICD-10-CM

## 2024-07-15 DIAGNOSIS — E03.9 ACQUIRED HYPOTHYROIDISM: Primary | ICD-10-CM

## 2024-07-15 LAB
ALBUMIN SERPL-MCNC: 3.3 G/DL (ref 3.4–5)
ALBUMIN/GLOB SERPL: 1.1 {RATIO} (ref 1–2)
ALP LIVER SERPL-CCNC: 70 U/L
ALT SERPL-CCNC: 23 U/L
ANION GAP SERPL CALC-SCNC: 10 MMOL/L (ref 0–18)
AST SERPL-CCNC: 23 U/L (ref 15–37)
BASOPHILS # BLD AUTO: 0.08 X10(3) UL (ref 0–0.2)
BASOPHILS NFR BLD AUTO: 1.2 %
BILIRUB SERPL-MCNC: 0.6 MG/DL (ref 0.1–2)
BUN BLD-MCNC: 32 MG/DL (ref 9–23)
CALCIUM BLD-MCNC: 9.4 MG/DL (ref 8.5–10.1)
CHLORIDE SERPL-SCNC: 115 MMOL/L (ref 98–112)
CO2 SERPL-SCNC: 17 MMOL/L (ref 21–32)
CREAT BLD-MCNC: 1.49 MG/DL
EGFRCR SERPLBLD CKD-EPI 2021: 34 ML/MIN/1.73M2 (ref 60–?)
EOSINOPHIL # BLD AUTO: 0.47 X10(3) UL (ref 0–0.7)
EOSINOPHIL NFR BLD AUTO: 7.2 %
ERYTHROCYTE [DISTWIDTH] IN BLOOD BY AUTOMATED COUNT: 15.4 %
FASTING STATUS PATIENT QL REPORTED: NO
GLOBULIN PLAS-MCNC: 3 G/DL (ref 2.8–4.4)
GLUCOSE BLD-MCNC: 140 MG/DL (ref 70–99)
HCT VFR BLD AUTO: 35.6 %
HGB BLD-MCNC: 11.4 G/DL
IMM GRANULOCYTES # BLD AUTO: 0.03 X10(3) UL (ref 0–1)
IMM GRANULOCYTES NFR BLD: 0.5 %
LDH SERPL L TO P-CCNC: 239 U/L
LYMPHOCYTES # BLD AUTO: 1.69 X10(3) UL (ref 1–4)
LYMPHOCYTES NFR BLD AUTO: 25.8 %
MCH RBC QN AUTO: 31.8 PG (ref 26–34)
MCHC RBC AUTO-ENTMCNC: 32 G/DL (ref 31–37)
MCV RBC AUTO: 99.4 FL
MONOCYTES # BLD AUTO: 0.69 X10(3) UL (ref 0.1–1)
MONOCYTES NFR BLD AUTO: 10.5 %
NEUTROPHILS # BLD AUTO: 3.59 X10 (3) UL (ref 1.5–7.7)
NEUTROPHILS # BLD AUTO: 3.59 X10(3) UL (ref 1.5–7.7)
NEUTROPHILS NFR BLD AUTO: 54.8 %
OSMOLALITY SERPL CALC.SUM OF ELEC: 303 MOSM/KG (ref 275–295)
PLATELET # BLD AUTO: 171 10(3)UL (ref 150–450)
POTASSIUM SERPL-SCNC: 4.2 MMOL/L (ref 3.5–5.1)
PROT SERPL-MCNC: 6.3 G/DL (ref 6.4–8.2)
RBC # BLD AUTO: 3.58 X10(6)UL
SODIUM SERPL-SCNC: 142 MMOL/L (ref 136–145)
T4 FREE SERPL-MCNC: 1.2 NG/DL (ref 0.8–1.7)
THYROGLOB SERPL-MCNC: <15 U/ML (ref ?–60)
THYROPEROXIDASE AB SERPL-ACNC: <28 U/ML (ref ?–60)
TSI SER-ACNC: 1.74 MIU/ML (ref 0.36–3.74)
VIT D+METAB SERPL-MCNC: 35.8 NG/ML (ref 30–100)
WBC # BLD AUTO: 6.6 X10(3) UL (ref 4–11)

## 2024-07-15 PROCEDURE — 83615 LACTATE (LD) (LDH) ENZYME: CPT

## 2024-07-15 PROCEDURE — 86376 MICROSOMAL ANTIBODY EACH: CPT

## 2024-07-15 PROCEDURE — 36415 COLL VENOUS BLD VENIPUNCTURE: CPT

## 2024-07-15 PROCEDURE — 82306 VITAMIN D 25 HYDROXY: CPT

## 2024-07-15 PROCEDURE — 80053 COMPREHEN METABOLIC PANEL: CPT

## 2024-07-15 PROCEDURE — 85025 COMPLETE CBC W/AUTO DIFF WBC: CPT

## 2024-07-15 PROCEDURE — 84443 ASSAY THYROID STIM HORMONE: CPT

## 2024-07-15 PROCEDURE — 86800 THYROGLOBULIN ANTIBODY: CPT

## 2024-07-15 PROCEDURE — 84439 ASSAY OF FREE THYROXINE: CPT

## 2024-07-15 PROCEDURE — 99214 OFFICE O/P EST MOD 30 MIN: CPT | Performed by: FAMILY MEDICINE

## 2024-07-15 NOTE — PATIENT INSTRUCTIONS
MD Cady Alvarado DO Natasha Kadakia, DO [address]  [address] [place] [id number] [phone]

## 2024-07-15 NOTE — PROGRESS NOTES
Chief Complaint   Patient presents with    Follow - Up     Follow up thyroid     Itching     C/o Recurring itchy all over body          HPI:  The patient complains of fatigue, gaining weight, dry, itchy skin. The patient has had for months The patient feels that sleep is appropriate. Patient gets excessively tired during the day.Admit that the level of exercise has been minimal of late.  Alleviated factors:rest  Aggravated factors:activites  Associated symptoms: no drugs usage, no alcohol abuse, no sedatives use, no poor conditioning, poor nutrition. No impaired concentration but getting forgetful easily now.  Prior therapies:hypothyroid.  Current activities:minimal.    Current Outpatient Medications   Medication Sig Dispense Refill    SYNTHROID 75 MCG Oral Tab TAKE 1 TABLET(75 MCG) BY MOUTH BEFORE BREAKFAST 90 tablet 0    ELIQUIS 5 MG Oral Tab TAKE 1 TABLET(5 MG) BY MOUTH TWICE DAILY 60 tablet 2    SIMVASTATIN 20 MG Oral Tab TAKE 1 TABLET(20 MG) BY MOUTH EVERY EVENING 90 tablet 0    FOLIC ACID 1 MG Oral Tab TAKE 1 TABLET BY MOUTH EVERY DAY 90 tablet 0    metoprolol succinate ER 50 MG Oral Tablet 24 Hr TAKE 1 TABLET(50 MG) BY MOUTH DAILY 90 tablet 0    Potassium Chloride ER 20 MEQ Oral Tab CR Take 1 tablet by mouth daily. 90 tablet 0    Omeprazole 40 MG Oral Capsule Delayed Release Take 1 capsule (40 mg total) by mouth daily. 90 capsule 1    FLUoxetine 20 MG Oral Cap Take 1 capsule (20 mg total) by mouth daily. 90 capsule 1    Nystatin (NYSTOP) 301853 UNIT/GM External Powder Apply topically every 6 (six) hours as needed.      ANORO ELLIPTA 62.5-25 MCG/ACT Inhalation Aerosol Powder, Breath Activated Inhale 1 puff into the lungs daily.      clobetasol 0.05 % External Solution Apply 1 Application topically daily as needed (to scalp).      cholecalciferol 25 MCG (1000 UT) Oral Tab Take 1 tablet (1,000 Units total) by mouth daily.      furosemide 20 MG Oral Tab Take 1 tablet (20 mg total) by mouth daily. 90 tablet 0     Acetaminophen ER (ACETAMINOPHEN 8 HOUR) 650 MG Oral Tab CR Take 1-2 tablets (650-1,300 mg total) by mouth 2 (two) times daily.      allopurinol 300 MG Oral Tab Take 1 tablet (300 mg total) by mouth daily.      valsartan 80 MG Oral Tab Take 1 tablet (80 mg total) by mouth daily.      Magnesium Oxide 400 (240 Mg) MG Oral Tab Take 1 tablet (400 mg total) by mouth daily.      albuterol (2.5 MG/3ML) 0.083% Inhalation Nebu Soln Take 3 mL (2.5 mg total) by nebulization every 4 (four) hours as needed.      clobetasol 0.05 % External Cream Apply topically daily as needed.      FEROSUL 325 (65 Fe) MG Oral Tab Take 1 tablet (325 mg total) by mouth daily with breakfast.      PREVIDENT 5000 PLUS 1.1 % Dental Cream       Calcium Carb-Cholecalciferol (CALTRATE 600+D3 OR) Take 1 tablet by mouth daily.      TraMADol HCl 50 MG Oral Tab Take 1 tablet (50 mg total) by mouth every 8 (eight) hours as needed for Pain.  0      Past Medical History:    Arthritis    psoriatic    Back problem    Bladder cancer (HCC)    TURBT - Dr. Modi    Disorder of thyroid    High blood pressure    High cholesterol    Hypothyroid    Incontinence of urine    Lung cancer (HCC)    Small cell lung ca    Osteoarthritis    Other and unspecified hyperlipidemia    Unspecified essential hypertension      Past Surgical History:   Procedure Laterality Date    Anesth,knee area surgery      arthrocentesis aspiration of knee joint    Cataract      Colonoscopy  2021    Cystourethroscopy,fulgur 2-5cm lesn Right 9/2/2015    Procedure: CYSTOSCOPY WITH RETROGRADE PYELOGRAM;  Surgeon: Pranay Modi MD;  Location: [facility]    Cystourethroscopy,fulgur <.5cm lesn N/A 12/28/2015    Procedure: CYSTOSCOPY TRANSURETHRAL RESECTION OF BLADDER TUMOR W/ MITOMYCIN;  Surgeon: Pranay Modi MD;  Location: [facility]    Hysterectomy  1/1/74    ZELALEM-BSO    Instill anticancer agent in bladder Right 12/28/2015    Procedure: CYSTOSCOPY WITH RETROGRADE PYELOGRAM;   Surgeon: Pranay Modi MD;  Location: [facility]    Mitomycin 5 mg inj N/A 2015    Procedure: CYSTOSCOPY TRANSURETHRAL RESECTION OF BLADDER TUMOR W/ MITOMYCIN;  Surgeon: Pranay Modi MD;  Location: [facility]    Oophorectomy  1974    Other  99    gastrotomy with suture of bleeding ulcer    Other surgical history  12/1/15    BTS Cysto - Dr. Modi    Other surgical history  16    BTS CystoJames Modi    Other surgical history  16    BTS Cystalfredo Modi    Other surgical history  2017    BTS cysto Dr Modi    Other surgical history  2019    BTS CystoJames Modi    Other surgical history      neck surgery    Other surgical history  2020    Cysto Dr. Modi     Other surgical history      Lung      Family History   Problem Relation Age of Onset    Cancer Father         liver    Diabetes Father     Heart Disorder Mother     Diabetes Mother     Diabetes Sister     Heart Disorder Sister     Diabetes Brother     Cancer Maternal Grandmother         ? colon cancer      Social History     Socioeconomic History    Marital status:     Number of children: 2   Occupational History    Occupation: RETIRED     Tobacco Use    Smoking status: Former     Current packs/day: 0.00     Average packs/day: 1 pack/day for 44.0 years (44.0 ttl pk-yrs)     Types: Cigarettes     Start date: 1957     Quit date: 2001     Years since quittin.4    Smokeless tobacco: Never   Vaping Use    Vaping status: Never Used   Substance and Sexual Activity    Alcohol use: Not Currently     Alcohol/week: 2.0 standard drinks of alcohol     Types: 2 Glasses of wine per week     Comment: daily - none recently    Drug use: No    Sexual activity: Not Currently     Partners: Male     Birth control/protection: Hysterectomy   Other Topics Concern    Caffeine Concern Yes     Comment: coffee 1 cup daily    Exercise No    Seat Belt Yes   Social History Narrative     , lives alone    Has 1 son - in [place]    Has 1 daughter - lives close by     Social Determinants of Health      [facility]    Housing Stability           ROS:  GEN: No night sweats  EYES: no vision issues  HEENT: .No tinnitus, no jaw pains, no sore throats.  NECK: no pain  CHEST: no chest pains, no SOB on exertion or at rest no palpations.No edema, no cough.  NEURO: no seizures, no confusion, no weakness, no abnormal sensation, no headaches.  GI: no nausea or vomiting, no abdominal pain  LYMPH: no tender glands.  MUSC: arthralgia, myalgia  SKIN: no rashes but itchy skin  PSYCH: no depression, anxiety, irrability    EXAM:  /46   Pulse 66   Resp 18   Ht 5' 4\" (1.626 m)   Wt 211 lb (95.7 kg)   SpO2 99%   BMI 36.22 kg/m²   GEN: NAD, A,O x3, pleasant.  HEENT: PEERLA, EOM-i, normal oral mucosa, TM wnl stewart.NO teeth clenching  NECK: supple, no lymphadenopathy.  LUNGS: CTA stewart.  HEART:S1/S2 reg., no murmurs, clicks, gallops.  ABD: soft, BS present, NT,ND, no organomegaly.  LYMPH: normal cervical, axillary,  and inguinal lymph nodes.  NEURO: CN 2-12 intact, moving 4 extremities without difficulty, dtr 2+/4+ x 4 ext. No diff with hand eye coordination. No papilledema. A & O x 3 and answering questions without difficulty. Visual fields intact on confrontation.  PSYCH: normal mood.      ASSESSMENT / PLAN:     Gillian was seen today for follow - up and itching.    Diagnoses and all orders for this visit:    Acquired hypothyroidism  -     Assay, Thyroid Stim Hormone; Future  -     Free T4, (Free Thyroxine); Future  -     Thyroid Peroxidase (TPO) AB; Future  -     US THYROID (CPT=76536); Future  -     Thyroid Antithyroglobulin AB; Future  -     Endocrine Referral - [facility] (Maunabogerry Pelayo)    Pruritus  -     Assay, Thyroid Stim Hormone; Future  -     Free T4, (Free Thyroxine); Future  -     Thyroid Peroxidase (TPO) AB; Future  -     US THYROID  (CPT=76536); Future  -     Thyroid Antithyroglobulin AB; Future  -     Vitamin D; Future  -     Endocrine Referral - [facility] (Nohelia Pelayo)    Other fatigue  -     Assay, Thyroid Stim Hormone; Future  -     Free T4, (Free Thyroxine); Future  -     Thyroid Peroxidase (TPO) AB; Future  -     US THYROID (CPT=76536); Future  -     Thyroid Antithyroglobulin AB; Future  -     Vitamin D; Future  -     Endocrine Referral - [facility] (Nohelia Pelayo)         Return to Office: 1 month.

## 2024-07-19 ENCOUNTER — TELEPHONE (OUTPATIENT)
Dept: FAMILY MEDICINE CLINIC | Facility: CLINIC | Age: 89
End: 2024-07-19

## 2024-07-19 DIAGNOSIS — E03.9 ACQUIRED HYPOTHYROIDISM: Primary | ICD-10-CM

## 2024-07-19 DIAGNOSIS — L29.9 PRURITUS: ICD-10-CM

## 2024-07-19 DIAGNOSIS — R53.83 OTHER FATIGUE: ICD-10-CM

## 2024-07-19 RX ORDER — LEVOTHYROXINE SODIUM 75 MCG
75 TABLET ORAL
Qty: 90 TABLET | Refills: 0 | Status: SHIPPED | OUTPATIENT
Start: 2024-07-19

## 2024-07-19 NOTE — TELEPHONE ENCOUNTER
Spoke to patient with results/instructions and she would like to go to and try brand synthroid and she will also set up an appointment with Endo to discuss further      Patient is currently on Brand synthroid 75mcg daily would you like to make any dose changes?

## 2024-07-19 NOTE — TELEPHONE ENCOUNTER
----- Message from Ree Murray sent at 7/16/2024  8:43 AM CDT -----  Pt has excellent results, can not explain why she is tired, if pt wants we can send brand name of Synthroid to [facility] pharmacy and see if its any better effect, also pt can see our endo for second opinion as discussed yesterday, pt has the info.

## 2024-07-19 NOTE — TELEPHONE ENCOUNTER
Rx sent   Referral placed  Called pt and informed of below response from provider  Questions answered and pt verbalized understanding.

## 2024-08-06 ENCOUNTER — TELEPHONE (OUTPATIENT)
Dept: FAMILY MEDICINE CLINIC | Facility: CLINIC | Age: 89
End: 2024-08-06

## 2024-08-06 ENCOUNTER — HOSPITAL ENCOUNTER (OUTPATIENT)
Dept: ULTRASOUND IMAGING | Age: 89
Discharge: HOME | End: 2024-08-06
Attending: FAMILY MEDICINE
Payer: MEDICARE

## 2024-08-06 DIAGNOSIS — E03.9 ACQUIRED HYPOTHYROIDISM: ICD-10-CM

## 2024-08-06 DIAGNOSIS — L29.9 PRURITUS: ICD-10-CM

## 2024-08-06 DIAGNOSIS — R53.83 OTHER FATIGUE: ICD-10-CM

## 2024-08-06 PROCEDURE — 76536 US EXAM OF HEAD AND NECK: CPT | Performed by: FAMILY MEDICINE

## 2024-08-06 NOTE — TELEPHONE ENCOUNTER
Spoke to patient with test results/recommendations and patient chooses not to follow up with ENT at this time.

## 2024-08-06 NOTE — TELEPHONE ENCOUNTER
----- Message from Ree Murray sent at 8/6/2024  2:10 PM CDT -----  Slightly increased size of thyroid nodule, if pt wishes ok to see   ENT group:      Dr. SHASHA Montaño        [address] [address]          [address]  (Opened 6/15/22)  [place]  [address]        [phone].

## 2024-08-12 ENCOUNTER — MED REC SCAN ONLY (OUTPATIENT)
Dept: FAMILY MEDICINE CLINIC | Facility: CLINIC | Age: 89
End: 2024-08-12

## 2024-08-13 ENCOUNTER — OFFICE VISIT (OUTPATIENT)
Dept: HEMATOLOGY/ONCOLOGY | Age: 89
End: 2024-08-13
Attending: INTERNAL MEDICINE
Payer: MEDICARE

## 2024-08-13 VITALS
TEMPERATURE: 97 F | BODY MASS INDEX: 36.28 KG/M2 | HEIGHT: 64.02 IN | SYSTOLIC BLOOD PRESSURE: 113 MMHG | HEART RATE: 61 BPM | RESPIRATION RATE: 18 BRPM | OXYGEN SATURATION: 99 % | DIASTOLIC BLOOD PRESSURE: 68 MMHG | WEIGHT: 212.5 LBS

## 2024-08-13 DIAGNOSIS — C67.8 MALIGNANT NEOPLASM OF OVERLAPPING SITES OF BLADDER (HCC): ICD-10-CM

## 2024-08-13 DIAGNOSIS — D64.9 NORMOCYTIC NORMOCHROMIC ANEMIA: ICD-10-CM

## 2024-08-13 DIAGNOSIS — C34.12 SMALL CELL CARCINOMA OF UPPER LOBE OF LEFT LUNG (HCC): Primary | ICD-10-CM

## 2024-08-13 DIAGNOSIS — C34.92 SQUAMOUS CELL CARCINOMA OF LEFT LUNG (HCC): ICD-10-CM

## 2024-08-13 DIAGNOSIS — N18.30 STAGE 3 CHRONIC KIDNEY DISEASE, UNSPECIFIED WHETHER STAGE 3A OR 3B CKD (HCC): ICD-10-CM

## 2024-08-13 DIAGNOSIS — I82.90 VTE (VENOUS THROMBOEMBOLISM): ICD-10-CM

## 2024-08-13 DIAGNOSIS — Z79.01 CHRONIC ANTICOAGULATION: ICD-10-CM

## 2024-08-13 PROCEDURE — 99215 OFFICE O/P EST HI 40 MIN: CPT | Performed by: INTERNAL MEDICINE

## 2024-08-13 NOTE — PROGRESS NOTES
Cancer Center Progress Note    Patient Name: Gillian Chávez   YOB: 1935   Medical Record Number: [id number]   CSN: [id number]   Attending Physician: Yanira Rousseau M.D.   Referring Physician: MD Dr. Sujit Byrd Dr., Dr., Dr., Dr.    Date of Visit: 8/13/2024     Chief Complaint:  Chief Complaint   Patient presents with    Follow - Up        Hem/Onc History:  1. Lung cancer x 2 s/p left VATS upper and lower lobe wedge resections at [facility] on 1/24/22. 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     - On 3/9/21 she presented with SOB and went to [facility] ED 3/9/21. CTA chest was done which showed no PE but did show a KAILYN and LLL nodule.     Given the presence of the 2 lung nodules she was referred to Pulmonary and saw Dr. Marks. She was recommended continued monitoring of the lung nodules with a repeat CT. This was done 4/2021 which showed the KAILYN nodule was smaller (10 x 7.7 mm -->6 x 9 mm) and the LLL nodule was slightly larger (8 x 3.8 mm--> 8.7 x 6.4 mm). She had a f/u CT 9/3/21 which showed LLL nodule increased to 10 mm and the KAILYN nodule had diminished in size. PET scan was subsequently done which showed uptake corresponding to the lung nodules- 5 mm lingular nodule SUV of 2.94, 9 mm LLL nodule SUV of 3.8. She was then referred to Thoracic surgery. She saw Dr. Murdock who recommended diagnostic wedge resection of increasing LLL nodule. Surgery was apparently delayed due to persistent anemia. She then had a f/u CT 1/7/22 which showed LULand LLL  nodules have increased in size. She was recommended wedge resection of both nodules.      - Received her first cycle of carboplatin and etoposide (adjuvant therapy for resected SCLC) 2/22/22      - Completed 4 cycles 5/4/22.      - Met with Rad Onc to discuss PCI. She decided  not to pursue.       2. 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 [place] to probably provoked with recent flight to [place]. Saw my colleague Dr. Arreguin at that time. [place] panel was done which was negative. She stopped Eliquis around 1/2020.     - Most recent VTE was in 2/2021. She underwent back surgery for spinal stenosis with radiculopathy 1/27/21 at [facility]. She was discharged 1/30/21. She then presented with acute SOB and was seen at [place] 2/8/21. W/u revealed saddle PE and BLE DVTs. She was placed on a heparin drip and eventually transitioned to Xarelto at discharge. CT imaging done at that time revealed a 1.4 cm KAILYN nodule.     She then developed dark stools and anemia (Hb was 8.6) and was readmitted 2/23/21 at [facility]. Her Xarelto was held and underwent EGD/colonoscopy. Per records no source of bleeding was found though prep was poor. She was given Venofer and started on Eliquis. She was discharged home on oral iron.       3. h/o low grade papillary urothelial carcinoma and underwent TURBT in 2015. Received mitomycin x 6 completed 3/2/2016    4. H/o CPPD, gout and psoriatic arthritis.       She was seen by one of my colleagues Dr. Arreguin and was also followed by Dr. Darrin Couch of [facility]. Transferred care 2/2022      History of Present Illness:   She reports feels fair- about the same. She c/o chronic joint and back pain. Got a trial injection of her back which she says did not help her pain. Pain limits her activity and has gained weight.  Unhappy with her weight but does not tolerate exercise. No nausea or vomiting.  Denies chest or abdominal pain, change in bowel or urinary habits, headaches, dizziness or visual symptoms. Stable MACDONALD.     Past Medical History:  Past Medical History:    Arthritis    psoriatic    Back problem    Bladder cancer (HCC)    TURBT - Dr. Modi    Disorder of thyroid    High blood pressure    High cholesterol     Hypothyroid    Incontinence of urine    Lung cancer (HCC)    Small cell lung ca    Osteoarthritis    Other and unspecified hyperlipidemia    Unspecified essential hypertension       Past Surgical History:  Past Surgical History:   Procedure Laterality Date    Anesth,knee area surgery      arthrocentesis aspiration of knee joint    Cataract      Colonoscopy  2021    Cystourethroscopy,fulgur 2-5cm lesn Right 9/2/2015    Procedure: CYSTOSCOPY WITH RETROGRADE PYELOGRAM;  Surgeon: Pranay Modi MD;  Location: [facility]    Cystourethroscopy,fulgur <.5cm lesn N/A 12/28/2015    Procedure: CYSTOSCOPY TRANSURETHRAL RESECTION OF BLADDER TUMOR W/ MITOMYCIN;  Surgeon: Pranay Modi MD;  Location: [facility]    Hysterectomy  1/1/74    ZELALEM-BSO    Instill anticancer agent in bladder Right 12/28/2015    Procedure: CYSTOSCOPY WITH RETROGRADE PYELOGRAM;  Surgeon: Pranay Modi MD;  Location: [facility]    Mitomycin 5 mg inj N/A 12/28/2015    Procedure: CYSTOSCOPY TRANSURETHRAL RESECTION OF BLADDER TUMOR W/ MITOMYCIN;  Surgeon: Pranay Modi MD;  Location: [facility]    Oophorectomy  1/1/1974    Other  1/1/99    gastrotomy with suture of bleeding ulcer    Other surgical history  12/1/15    S Cysto James Modi    Other surgical history  4/19/16    BTS Hector Modi    Other surgical history  7/12/16    BTS Cystalfredo Modi    Other surgical history  07/25/2017    BTS cystdat Modi    Other surgical history  01/08/2019    BTS CystoJames Modi    Other surgical history      neck surgery    Other surgical history  01/21/2020    Maurizio Modi     Other surgical history      Lung       Family Medical History:  Family History   Problem Relation Age of Onset    Cancer Father         liver    Diabetes Father     Heart Disorder Mother     Diabetes Mother     Diabetes Sister     Heart Disorder Sister     Diabetes Brother     Cancer Maternal Grandmother         ? colon cancer        Gyne History:  OB History    Para Term  AB Living   0 0 0 0 0 0   SAB IAB Ectopic Multiple Live Births   0 0 0 0 0   Obstetric Comments   Children are adopted   Menarche: 17 y/o   Menopause due to ZELALEM-BSO in  due to heavy bleeding/fibroids       Psychosocial History:  Social History     Socioeconomic History    Marital status:      Spouse name: Not on file    Number of children: 2    Years of education: Not on file    Highest education level: Not on file   Occupational History    Occupation: RETIRED     Tobacco Use    Smoking status: Former     Current packs/day: 0.00     Average packs/day: 1 pack/day for 44.0 years (44.0 ttl pk-yrs)     Types: Cigarettes     Start date: 1957     Quit date: 2001     Years since quittin.5    Smokeless tobacco: Never   Vaping Use    Vaping status: Never Used   Substance and Sexual Activity    Alcohol use: Not Currently     Alcohol/week: 2.0 standard drinks of alcohol     Types: 2 Glasses of wine per week     Comment: daily - none recently    Drug use: No    Sexual activity: Not Currently     Partners: Male     Birth control/protection: Hysterectomy   Other Topics Concern     Service Not Asked    Blood Transfusions Not Asked    Caffeine Concern Yes     Comment: coffee 1 cup daily    Occupational Exposure Not Asked    Hobby Hazards Not Asked    Sleep Concern Not Asked    Stress Concern Not Asked    Weight Concern Not Asked    Special Diet Not Asked    Back Care Not Asked    Exercise No    Bike Helmet Not Asked    Seat Belt Yes    Self-Exams Not Asked   Social History Narrative    , lives alone    Has 1 son - in [place]    Has 1 daughter - lives close by     Social Determinants of Health     Financial Resource Strain: Not on file   Food Insecurity: Not on file   Transportation Needs: Not on file   Physical Activity: Not on file   Stress: Not on file   Social Connections: Not on file   Housing Stability: Low  Risk  (9/28/2021)    [facility]    Housing Stability     Mortgage Payment Concerns?: Not on file     Number of Places Lived in the Last Year: Not on file     Unstable Housing?: Not on file       Allergies:   Allergies   Allergen Reactions    Diazepam OTHER (SEE COMMENTS)     Other reaction(s): Other    Amoxil RASH     TABS    Augmentin, [Amoxicillin-Pot Clavulanate] UNKNOWN    Cephalosporins      hives    Penicillins      hives    Codeine RASH       Current Medications:  Current Outpatient Medications on File Prior to Visit   Medication Sig Dispense Refill    SYNTHROID 75 MCG Oral Tab Take 1 tablet (75 mcg total) by mouth before breakfast. 90 tablet 0    ELIQUIS 5 MG Oral Tab TAKE 1 TABLET(5 MG) BY MOUTH TWICE DAILY 60 tablet 2    SIMVASTATIN 20 MG Oral Tab TAKE 1 TABLET(20 MG) BY MOUTH EVERY EVENING 90 tablet 0    FOLIC ACID 1 MG Oral Tab TAKE 1 TABLET BY MOUTH EVERY DAY 90 tablet 0    metoprolol succinate ER 50 MG Oral Tablet 24 Hr TAKE 1 TABLET(50 MG) BY MOUTH DAILY 90 tablet 0    Potassium Chloride ER 20 MEQ Oral Tab CR Take 1 tablet by mouth daily. 90 tablet 0    Omeprazole 40 MG Oral Capsule Delayed Release Take 1 capsule (40 mg total) by mouth daily. 90 capsule 1    FLUoxetine 20 MG Oral Cap Take 1 capsule (20 mg total) by mouth daily. 90 capsule 1    Nystatin (NYSTOP) 238748 UNIT/GM External Powder Apply topically every 6 (six) hours as needed.      ANORO ELLIPTA 62.5-25 MCG/ACT Inhalation Aerosol Powder, Breath Activated Inhale 1 puff into the lungs daily.      cholecalciferol 25 MCG (1000 UT) Oral Tab Take 1 tablet (1,000 Units total) by mouth daily.      furosemide 20 MG Oral Tab Take 1 tablet (20 mg total) by mouth daily. 90 tablet 0    Acetaminophen ER (ACETAMINOPHEN 8 HOUR) 650 MG Oral Tab CR Take 1-2 tablets (650-1,300 mg total) by mouth 2 (two) times daily.      allopurinol 300 MG Oral Tab Take 1 tablet (300 mg total) by mouth daily.       valsartan 80 MG Oral Tab Take 1 tablet (80 mg total) by mouth daily.      Magnesium Oxide 400 (240 Mg) MG Oral Tab Take 1 tablet (400 mg total) by mouth daily.      albuterol (2.5 MG/3ML) 0.083% Inhalation Nebu Soln Take 3 mL (2.5 mg total) by nebulization every 4 (four) hours as needed.      FEROSUL 325 (65 Fe) MG Oral Tab Take 1 tablet (325 mg total) by mouth daily with breakfast.      PREVIDENT 5000 PLUS 1.1 % Dental Cream       Calcium Carb-Cholecalciferol (CALTRATE 600+D3 OR) Take 1 tablet by mouth daily.      TraMADol HCl 50 MG Oral Tab Take 1 tablet (50 mg total) by mouth every 8 (eight) hours as needed for Pain.  0    clobetasol 0.05 % External Solution Apply 1 Application topically daily as needed (to scalp).      clobetasol 0.05 % External Cream Apply topically daily as needed.       No current facility-administered medications on file prior to visit.       Review of Systems:  A 14-point ROS was done with pertinent positives and negative per the HPI    Vital Signs:  /68 (BP Location: Left arm, Patient Position: Sitting, Cuff Size: adult)   Pulse 61   Temp 97.1 °F (36.2 °C) (Tympanic)   Resp 18   Ht 1.626 m (5' 4.02\")   Wt 96.4 kg (212 lb 8 oz)   SpO2 99%   BMI 36.46 kg/m²     Physical Examination:  General: Patient is alert and oriented x 3, not in acute distress.   HEENT: EOMs intact. PERRL. Oropharynx clear   Neck: No JVD. No palpable lymphadenopathy. Neck is supple.  Chest: Clear to auscultation. Incision sites left well healed   Heart: Regular  Abdomen: Soft, non tender with good bowel sounds.  Extremities: Pedal pulses are present. BLE edema. Deformities PIP joints  Neurological: Grossly intact.   Lymphatics: There is no palpable lymphadenopathy throughout in the cervical, supraclavicular, axillary, or inguinal regions.  Psych/Depression: Mood and affect are appropriate.    Laboratory:  Lab Results   Component Value Date    WBC 6.6 07/15/2024    RBC 3.58 (L) 07/15/2024    HGB 11.4 (L)  07/15/2024    HCT 35.6 07/15/2024    .0 07/15/2024    MCV 99.4 07/15/2024    MCH 31.8 07/15/2024    MCHC 32.0 07/15/2024    RDW 15.4 07/15/2024    NEPRELIM 3.59 07/15/2024    NEUTABS 9.17 (H) 05/03/2022    LYMPHABS 1.57 05/03/2022    EOSABS 0.13 05/03/2022    BASABS 0.00 05/03/2022    NEUT 68 05/03/2022    LYMPH 12 05/03/2022    MON 16 05/03/2022    EOS 1 05/03/2022    BASO 0 05/03/2022    NEPERCENT 54.8 07/15/2024    LYPERCENT 25.8 07/15/2024    MOPERCENT 10.5 07/15/2024    EOPERCENT 7.2 07/15/2024    BAPERCENT 1.2 07/15/2024    NE 3.59 07/15/2024    LYMABS 1.69 07/15/2024    MOABSO 0.69 07/15/2024    EOABSO 0.47 07/15/2024    BAABSO 0.08 07/15/2024     Lab Results   Component Value Date     (H) 07/15/2024    BUN 32 (H) 07/15/2024    BUNCREA 29.5 (H) 05/25/2021    CREATSERUM 1.49 (H) 07/15/2024    ANIONGAP 10 07/15/2024    GFR 52 (L) 04/05/2017    GFRNAA 41 (L) 07/18/2022    GFRAA 47 (L) 07/18/2022    CA 9.4 07/15/2024    OSMOCALC 303 (H) 07/15/2024    ALKPHO 70 07/15/2024    AST 23 07/15/2024    ALT 23 07/15/2024    BILT 0.6 07/15/2024    TP 6.3 (L) 07/15/2024    ALB 3.3 (L) 07/15/2024    GLOBULIN 3.0 07/15/2024    AGRATIO 1.6 07/22/2011     07/15/2024    K 4.2 07/15/2024     (H) 07/15/2024    CO2 17.0 (L) 07/15/2024     Lab Component   Component Value Date/Time     07/15/2024 0937       Radiology:  PROCEDURE:  CT CHEST+ABDOMEN+PELVIS(ALL CNTRST ONLY)(CPT=71260/19920)     COMPARISON:  [facility], CT, CT CHEST(CONTRAST ONLY) (CPT=71260), 8/14/2023, 5:56 PM.     INDICATIONS:  C34.92 Squamous cell carcinoma of left lung (HCC) C34.12 Small cell carcinoma of upper lobe of left lung (HCC)     TECHNIQUE:  IV contrast-enhanced scanning through the chest, abdomen, and pelvis was performed.  Dose reduction techniques were used. Dose information is transmitted to the ACR (American College of Radiology) NRDR (National Radiology Data Registry) which   includes the Dose Index Registry.      PATIENT STATED HISTORY:(As transcribed by Technologist)  Patient has a Hx of lung cancer, routine imaging assessment for stability.      CONTRAST USED:  100cc of Isovue 370     FINDINGS:       CHEST:    LUNGS:  Stable postsurgical changes in the lingula with volume loss.  Soft tissue density in the lingula measuring 1.7 x 0.8 cm is morphologically stable.  This is adjacent to sutures/surgical clips.  MEDIASTINUM:  No mass or adenopathy.  Stable nodules in the right thyroid lobe.  ARI:  Calcified right perihilar lymph nodes.  CARDIAC:  Coronary arterial calcifications are noted.  PLEURA:  No mass or effusion.    CHEST WALL:  No mass or axillary adenopathy.    AORTA:  No aneurysm or dissection.    VASCULATURE:  No visible pulmonary arterial thrombus or attenuation.       ABDOMEN/PELVIS:  LIVER:  Scattered calcified granulomas in the liver.  BILIARY:  No visible dilatation or calcification.    PANCREAS:  No lesion, fluid collection, ductal dilatation, or atrophy.    SPLEEN:  Calcified granulomas in the spleen.  KIDNEYS:  No mass, obstruction, or calcification.    ADRENALS:  Previously noted nodule left adrenal gland is likely related to asymmetric nodular thickening of the left adrenal gland.  Overall this region measures 1.4 x 1.2 cm and is stable.  AORTA:  Vascular calcifications are noted.  RETROPERITONEUM:  No mass or adenopathy.    BOWEL/MESENTERY:  Diverticulosis of the colon without evidence of acute diverticulitis.  ABDOMINAL WALL:  No mass or hernia.    URINARY BLADDER:  No visible focal wall thickening, lesion, or calculus.    PELVIC NODES:  No adenopathy.    PELVIC ORGANS:  Sequelae of hysterectomy.  BONES:  Hardware at L5-S1.                  Impression  CONCLUSION:       1. Postoperative changes in the lingula with volume loss and scarring is stable.     2. Diverticulosis of the colon without evidence of acute diverticulitis.     3. No new suspicious nodule, mass or consolidation     4. Mild nodular  thickening of the left adrenal gland is of questionable significance and is stable.             LOCATION:  [facility]           Dictated by (CST): Bassam Irving MD on 2/26/2024 at 10:18 AM      Finalized by (CST): Bassam Irving MD on 2/26/2024 at 10:25 AM      PROCEDURE:  MRI BRAIN (W+WO) (CPT=70553)     [facility]  [facility] , MRI BRAIN (W+WO) (CPT=70553), 4/11/2023, 12:27 PM.     INDICATIONS:  C34.12 Small cell carcinoma of upper lobe of left lung (HCC) C34.92 Squamous cell carcinoma of left lung (HCC)     TECHNIQUE:  MRI of the brain was performed with multi-planar T1, T2-weighted images with FLAIR sequences and diffusion weighted images without and with infusion.     PATIENT STATED HISTORY:(As transcribed by Technologist)  Lung cancer.      CONTRAST USED:  30 mL of Dotarem     FINDINGS:  Patient motion noted.  This may obscure subtle intracranial metastatic disease.     Stable mild global brain parenchymal volume loss without overt hydrocephalus.  There is no midline shift or mass effect.  The basal cisterns are patent.  The gray-white matter differentiation is intact.  The craniocervical junction is unremarkable.       Stable mild chronic microvascular ischemic changes in the cerebral white matter and lissette.  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     There is no acute intracranial hemorrhage or extra-axial fluid collection identified.  No significant abnormal parenchymal gradient susceptibility. There is no abnormal parenchymal or leptomeningeal enhancement.  There is no restricted diffusion to  suggest acute ischemia/infarction.     Trace fluid in the mastoid air cells.  Minimal ethmoid mucosal thickening.  Bilateral intra-ocular lens implants.  The expected major intracranial flow voids are present.  Partially imaged cervical fusion hardware in the upper cervical spine.                      Impression   CONCLUSION:       1. No acute intracranial abnormality identified.  No evidence of  intracranial metastatic disease within the limitations of the exam.     2. Stable mild chronic microvascular ischemic changes in the cerebral white matter and lissette.  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     Please see above for further details.     LOCATION:  [id number]        Dictated by (CST): Chandan Pratt MD on 10/02/2023 at 12:34 PM      Finalized by (CST): Chandan Pratt MD on 10/02/2023 at 12:39 PM       PROCEDURE:  CT CHEST(CONTRAST ONLY) (CPT=71260)     COMPARISON:  [facility], CT, CT CHEST (CPT=71250), 4/11/2023, 12:09 PM.     INDICATIONS:  C34.12 Small cell carcinoma of upper lobe of left lung (HCC) C34.92 Squamous cell carcinoma of left lung (HCC)     TECHNIQUE:  CT images were obtained with non-ionic intravenous contrast material. Dose reduction techniques were used. Dose information is transmitted to the ACR (American College of Radiology) NRDR (National Radiology Data Registry) which includes the  Dose Index Registry.     PATIENT STATED HISTORY:(As transcribed by Technologist)  Patient is here for a follow up for history of left lung cancer.      CONTRAST USED:  75cc of Isovue 370     FINDINGS:    LUNGS:  There is a new 0.1 cm nodule in the very posterior right upper lobe abutting major fissure.  Series 301, image 46.    Stable postsurgical/treatment changes on the left, including areas of scarring and moderate bronchiectasis.  The previously measured area of soft tissue is unchanged and may be scarring.  2.2 x 0.9 cm series 301 image 60, previously measured at 2.4 x  0.8 cm.    VASCULATURE:  Smooth tapering.  ARI:  No adenopathy.  MEDIASTINUM:  No adenopathy.  The thyroid gland is partially imaged.  1.4 cm right nodule is unchanged.  CARDIAC:  No pericardial effusion.  No significant coronary calcifications.  PLEURA:  No pleural effusion.  THORACIC AORTA:  Normal caliber.  CHEST WALL:  No axillary adenopathy.  LIMITED ABDOMEN:  Small left adrenal nodule measuring 1.1 x 1.0 cm, remeasured on  the prior at 1.2 x 1.0 cm.  Calcifications in the liver and spleen consistent with prior granulomatous disease.  BONES:  Overall moderate to severe degenerative changes.                      Impression   CONCLUSION:    1. New tiny right-sided nodule.  This is most likely inflammatory/infectious in nature.  A short-term, 3-4 month follow-up could offer continued assessment.  2. Other findings appear stable.  3. Details as above.               LOCATION:  [facility]        Dictated by (CST): David Daniels MD on 8/15/2023 at 9:44 AM      Finalized by (CST): David Daniels MD on 8/15/2023 at 9:54 AM       PROCEDURE:  CT CHEST (CPT=71250)     LOCATION:  [id number]     COMPARISON:  [facility], CT, CT CHEST (CPT=71250), 11/30/2022, 9:14 AM.     INDICATIONS:  C34.92 Squamous cell carcinoma of left lung (HCC) C34.12 Small cell carcinoma of upper lobe of left lung (HCC)     TECHNIQUE:  Unenhanced multislice CT scanning is performed through the chest.  Dose reduction techniques were used. Dose information is transmitted to the ACR (American College of Radiology) NRDR (National Radiology Data Registry) which includes the Dose   Index Registry.     PATIENT STATED HISTORY: (As transcribed by Technologist)  Follow up on lung ca. No current complaints.         FINDINGS:    LUNGS:  Postsurgical changes of partial left pneumonectomy with pleural parenchymal scarring.  Surgical clips are along the major fissure.  Scar-like opacity measures 24 x 8 versus 26 x 11 mm along the major fissure in the area of the sutures is without  significant change.  Subpleural linear scarring is also noted in the region of the lingula also stable.  Left lower lobe anterior segment minimal scarring is along the bronchovascular bundle and is stable.  There is no new pulmonary nodule or focal  consolidation.  VASCULATURE:  Thrombus cannot be excluded without intravenous contrast.    ARI:  No mass or adenopathy.  Right hilar calcified nodes are consistent with prior  granulomatous disease.  MEDIASTINUM:  No mass or adenopathy.    CARDIAC:  Coronary artery calcifications are mild.  There is no pericardial effusion.  PLEURA:  No mass or effusion.    THORACIC AORTA:  Atherosclerosis.  CHEST WALL:  No mass or axillary adenopathy.    LIMITED ABDOMEN:  Limited images of the upper abdomen demonstrate multiple intrahepatic and intrasplenic calcifications consistent with prior granulomatous disease.    BONES:  Stable including diffuse degenerative change.  No fracture.                     Impression   CONCLUSION:    1. No significant change when compared to most recent noncontrast CT of the chest performed 11/30/2022.        Dictated by (CST): Trisha Holbrook MD on 4/11/2023 at 8:37 PM      Finalized by (CST): Trisha Holbrook MD on 4/11/2023 at 8:43 PM         PROCEDURE:  MRI BRAIN (W+WO) (CPT=70553)     LOCATION:                                           COMPARISON:  [facility], MR, MRI BRAIN (W+WO) (CPT=70553), 8/24/2022, 1:02 PM.     INDICATIONS:  C34.92 Squamous cell carcinoma of left lung (HCC) C34.12 Small cell carcinoma of upper lobe of left lung (HCC)     TECHNIQUE:  MRI of the brain was performed with multi-planar T1, T2-weighted images with FLAIR sequences and diffusion weighted images without and with infusion.     PATIENT STATED HISTORY:(As transcribed by Technologist)  Lung cancer, surveillance examination.      CONTRAST USED:  28 mL of Dotarem     FINDINGS:  Patient motion noted.     The ventricles and sulci are within normal limits.  There is no midline shift or mass effect.  The basal cisterns are patent.  The gray-white matter differentiation is intact.  The craniocervical junction is unremarkable.  Partially empty sella.     Stable mild chronic microvascular ischemic changes in the cerebral white matter and lissette.  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     There is no acute intracranial hemorrhage or extra-axial fluid collection identified.  No significant abnormal  parenchymal gradient susceptibility. There is definite no abnormal parenchymal or leptomeningeal enhancement.  There is no restricted diffusion   to suggest acute ischemia/infarction.     Bilateral intra-ocular lens implants.  Minimal fluid in the bilateral mastoid air cells.  Trace ethmoid and maxillary mucosal thickening.  The expected major intracranial flow voids are present.  Posterior cervical fusion hardware in the partially imaged   upper cervical spine.                      Impression   CONCLUSION:       1. No acute intracranial abnormality identified.  No specific findings to suggest intracranial metastatic disease within the limitations of the exam.     2. Stable mild chronic microvascular ischemic changes in the cerebral white matter and lissette.          Dictated by (CST): Chandan Pratt MD on 4/11/2023 at 1:53 PM      Finalized by (CST): Chandan Pratt MD on 4/11/2023 at 1:56 PM         Impression & Plan:   1. Lung cancer x 2 s/p wedge resections (both NSCLC and SCLC)- Last seen in 3/2023. Overdue for surveillance scans which I ordered. Last scans in February showed no overt evidence of malignancy.  MRI brain due April which she has not done. Did have MRI brain in October which showed no acute intracranial abnormality.     2. OA, psoriatric arthritis, pseudogout- followed by rheum. On hydroxychloroquine. Not a candidate for NSAIDs given CKD. Does not want to pursue biologics. On allopurinol    3. Anemia- from previous chemo and likely some component from CKD. At baseline    4. H/o low grade papillary urothelial cancer- follows with . Last cysto was 1/21/2020 which was negative. Behind with f/u with  and cysto.  She will contact them for an appointment    5. 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 Dr. Couch/Lindsay Silva NP of [facility] followed her for this     6. CKD- followed by nephrology in [place] (Dr. Person)    7. Chronic LBP followed at the pain clinic. Says no  interventions have helped and just has \"to live with it\".     8. COPD on Spiriva and albuterol.  No recent COPD exacerbations.       Labs and imaging reviewed    Due for CT C/A/P and (overdue) MRI brain- ordered    RTC 6 months     Yanira Rousseau MD  [facility]

## 2024-08-13 NOTE — PROGRESS NOTES
Here for MD fu visit for h/o lung ca  Chronic back pain  Dyspnea with exertion   No CP  No HA, dizziness    Education Record     Learner:  Patient     Disease / Diagnosis: lung ca     Barriers / Limitations: none                 Comments:     Method:  Discussion                Comments:     General Topics:  Plan of care reviewed                Comments:     Outcome:  Shows understanding                Comments:  CT and MRI order given to pt with instructions and phone number to schedule

## 2024-08-21 ENCOUNTER — HOSPITAL ENCOUNTER (OUTPATIENT)
Dept: CT IMAGING | Age: 89
Discharge: HOME | End: 2024-08-21
Attending: INTERNAL MEDICINE
Payer: MEDICARE

## 2024-08-21 DIAGNOSIS — C34.92 SQUAMOUS CELL CARCINOMA OF LEFT LUNG (HCC): ICD-10-CM

## 2024-08-21 DIAGNOSIS — C34.12 SMALL CELL CARCINOMA OF UPPER LOBE OF LEFT LUNG (HCC): ICD-10-CM

## 2024-08-21 LAB
CREAT BLD-MCNC: 1.5 MG/DL
EGFRCR SERPLBLD CKD-EPI 2021: 34 ML/MIN/1.73M2 (ref 60–?)

## 2024-08-21 PROCEDURE — 71260 CT THORAX DX C+: CPT | Performed by: INTERNAL MEDICINE

## 2024-08-21 PROCEDURE — 82565 ASSAY OF CREATININE: CPT

## 2024-08-21 PROCEDURE — 74177 CT ABD & PELVIS W/CONTRAST: CPT | Performed by: INTERNAL MEDICINE

## 2024-08-22 ENCOUNTER — TELEPHONE (OUTPATIENT)
Dept: HEMATOLOGY/ONCOLOGY | Facility: HOSPITAL | Age: 89
End: 2024-08-22

## 2024-08-22 DIAGNOSIS — C34.12 SMALL CELL CARCINOMA OF UPPER LOBE OF LEFT LUNG (HCC): Primary | ICD-10-CM

## 2024-08-22 DIAGNOSIS — C34.92 SQUAMOUS CELL CARCINOMA OF LEFT LUNG (HCC): ICD-10-CM

## 2024-08-26 RX ORDER — FOLIC ACID 1 MG/1
TABLET ORAL
Qty: 90 TABLET | Refills: 0 | Status: SHIPPED | OUTPATIENT
Start: 2024-08-26

## 2024-08-28 RX ORDER — FUROSEMIDE 20 MG
20 TABLET ORAL DAILY
Qty: 90 TABLET | Refills: 0 | OUTPATIENT
Start: 2024-08-28

## 2024-08-28 RX ORDER — FUROSEMIDE 20 MG
20 TABLET ORAL DAILY
Qty: 90 TABLET | Refills: 3 | Status: SHIPPED | OUTPATIENT
Start: 2024-08-28

## 2024-08-28 NOTE — TELEPHONE ENCOUNTER
LOV- 7/15/2024     RF- 2/27/2024    Thyroid dose too high  Decrease levothyroxine 125 mcg daily and recheck tsh in 6 weeks

## 2024-08-29 ENCOUNTER — DOCUMENTATION ONLY (OUTPATIENT)
Dept: HEMATOLOGY/ONCOLOGY | Facility: HOSPITAL | Age: 89
End: 2024-08-29

## 2024-09-16 RX ORDER — APIXABAN 5 MG/1
5 TABLET, FILM COATED ORAL 2 TIMES DAILY
Qty: 60 TABLET | Refills: 2 | Status: SHIPPED | OUTPATIENT
Start: 2024-09-16

## 2024-09-18 RX ORDER — POTASSIUM CHLORIDE 1500 MG/1
1 TABLET, EXTENDED RELEASE ORAL DAILY
Qty: 90 TABLET | Refills: 0 | Status: SHIPPED | OUTPATIENT
Start: 2024-09-18

## 2024-09-18 NOTE — TELEPHONE ENCOUNTER
LOV 7/15/24   Last CMP 7/15/24     Medication Detail    Medication Quantity Refills Start End   Potassium Chloride ER 20 MEQ Oral Tab CR 90 tablet 0 4/15/2024 --   Sig:   Take 1 tablet by mouth daily.     Route:   Oral     Order #:   [id number]         Routing refill request to Dr. SANCHEZ for review

## 2024-09-23 DIAGNOSIS — F33.8 SEASONAL DEPRESSION (HCC): ICD-10-CM

## 2024-09-23 RX ORDER — METRONIDAZOLE 10 MG/G
GEL TOPICAL
COMMUNITY
Start: 2024-09-04

## 2024-09-26 ENCOUNTER — HOSPITAL ENCOUNTER (OUTPATIENT)
Dept: MRI IMAGING | Facility: HOSPITAL | Age: 89
Discharge: HOME | End: 2024-09-26
Attending: INTERNAL MEDICINE
Payer: MEDICARE

## 2024-09-26 DIAGNOSIS — C34.12 SMALL CELL CARCINOMA OF UPPER LOBE OF LEFT LUNG (HCC): ICD-10-CM

## 2024-09-26 DIAGNOSIS — C34.92 SQUAMOUS CELL CARCINOMA OF LEFT LUNG (HCC): ICD-10-CM

## 2024-09-26 PROCEDURE — A9575 INJ GADOTERATE MEGLUMI 0.1ML: HCPCS | Performed by: INTERNAL MEDICINE

## 2024-09-26 PROCEDURE — 70553 MRI BRAIN STEM W/O & W/DYE: CPT | Performed by: INTERNAL MEDICINE

## 2024-09-26 RX ORDER — GADOTERATE MEGLUMINE 376.9 MG/ML
20 INJECTION INTRAVENOUS
Status: COMPLETED | OUTPATIENT
Start: 2024-09-26 | End: 2024-09-26

## 2024-09-26 RX ADMIN — GADOTERATE MEGLUMINE 20 ML: 376.9 INJECTION INTRAVENOUS at 18:40:00

## 2024-09-27 ENCOUNTER — TELEPHONE (OUTPATIENT)
Dept: HEMATOLOGY/ONCOLOGY | Facility: HOSPITAL | Age: 89
End: 2024-09-27

## 2024-09-28 DIAGNOSIS — E78.00 PURE HYPERCHOLESTEROLEMIA: Primary | ICD-10-CM

## 2024-09-30 RX ORDER — SIMVASTATIN 20 MG
20 TABLET ORAL EVERY EVENING
Qty: 90 TABLET | Refills: 0 | Status: SHIPPED | OUTPATIENT
Start: 2024-09-30

## 2024-10-02 RX ORDER — OMEPRAZOLE 40 MG/1
40 CAPSULE, DELAYED RELEASE ORAL DAILY
Qty: 90 CAPSULE | Refills: 1 | Status: SHIPPED | OUTPATIENT
Start: 2024-10-02

## 2024-11-05 NOTE — TELEPHONE ENCOUNTER
Priscila Quiroz 5 minutes ago (10:32 AM)     DT       Prior authorization request completed for: [employer]   Authorization # [id number]  Authorization dates: 8/10/2022 - 11/8/2022  CPT codes approved: 56922  Number of visits/dates of service approved: 1  Physician: Dr. Meridee Cushing   Location: [facility]  Patient can be scheduled. Routed to Navigator. recommend indy lift transfers/unable to perform

## 2024-11-10 DIAGNOSIS — M17.11 PRIMARY OSTEOARTHRITIS OF RIGHT KNEE: ICD-10-CM

## 2024-11-11 RX ORDER — METOPROLOL SUCCINATE 50 MG/1
50 TABLET, EXTENDED RELEASE ORAL DAILY
Qty: 90 TABLET | Refills: 0 | Status: SHIPPED | OUTPATIENT
Start: 2024-11-11

## 2024-12-02 RX ORDER — FOLIC ACID 1 MG/1
TABLET ORAL
Qty: 90 TABLET | Refills: 0 | Status: SHIPPED | OUTPATIENT
Start: 2024-12-02 | End: 2025-03-01

## 2024-12-17 RX ORDER — POTASSIUM CHLORIDE 1500 MG/1
1 TABLET, EXTENDED RELEASE ORAL DAILY
Qty: 90 TABLET | Refills: 0 | Status: SHIPPED | OUTPATIENT
Start: 2024-12-17

## 2025-01-02 ENCOUNTER — TELEPHONE (OUTPATIENT)
Age: 89
End: 2025-01-02

## 2025-01-02 NOTE — TELEPHONE ENCOUNTER
Received Family Medical Leave Act forms for patient via email from office, valid Release of Information attached, logged for processing.

## 2025-01-03 NOTE — TELEPHONE ENCOUNTER
Cld patient, gathered the below, Patient gave the details below, forms are for her daughter Haily Resendiz     Type of Leave: INTERMITTENT Family Medical Leave Act / DAUGHTER   Reason for Leave: Care for patient Cancer Dx  Start date of leave: 12/16/2024  End date of leave: 06/15/2025  How many flare ups per month/length?: 1-2 visits a month lasting 1 day in duration   How many appts per month/length?: 3-5 appointments a month lasting 1-4 hrs each.   Was Fee and Turnaround info Given?: Yes

## 2025-01-04 NOTE — TELEPHONE ENCOUNTER
Dr. Rousseau,      Please sign off on form if you agree to: Family Medical Leave Act Intermittent Daughter 12/16/2024-06/15/2025  (place your signature on the first page only)    -From your Inbasket, Highlight the patient and click Chart   -Double click the 01/02/2025 Forms Completion telephone encounter  -Scroll down to the Media section   -Click the blue Hyperlink: Family Medical Leave Act Daughter Dr. Yanira Rousseau 01/04/2024  -Click Acknowledge located in the top right ribbon/menu   -Drag the mouse into the blank space of the document and a + sign will appear. Left click to   electronically sign the document.     Thank you,  Laury DONOVAN

## 2025-01-07 RX ORDER — APIXABAN 5 MG/1
5 TABLET, FILM COATED ORAL 2 TIMES DAILY
Qty: 60 TABLET | Refills: 2 | Status: SHIPPED | OUTPATIENT
Start: 2025-01-07 | End: 2025-04-08

## 2025-01-08 NOTE — TELEPHONE ENCOUNTER
Family Medical Leave Act forms faxed to [place] [employer] @ [phone] on 01/06/2025 successfully, calling patient to adv no MyChart to send to patient.

## 2025-01-15 DIAGNOSIS — E78.00 PURE HYPERCHOLESTEROLEMIA: ICD-10-CM

## 2025-01-15 RX ORDER — SIMVASTATIN 20 MG
20 TABLET ORAL EVERY EVENING
Qty: 90 TABLET | Refills: 0 | Status: SHIPPED | OUTPATIENT
Start: 2025-01-15

## 2025-02-09 DIAGNOSIS — M17.11 PRIMARY OSTEOARTHRITIS OF RIGHT KNEE: ICD-10-CM

## 2025-02-11 RX ORDER — METOPROLOL SUCCINATE 50 MG/1
50 TABLET, EXTENDED RELEASE ORAL DAILY
Qty: 90 TABLET | Refills: 0 | Status: SHIPPED | OUTPATIENT
Start: 2025-02-11

## 2025-03-01 RX ORDER — FOLIC ACID 1 MG/1
TABLET ORAL
Qty: 90 TABLET | Refills: 0 | Status: SHIPPED | OUTPATIENT
Start: 2025-03-01

## 2025-03-18 ENCOUNTER — MED REC SCAN ONLY (OUTPATIENT)
Dept: FAMILY MEDICINE CLINIC | Facility: CLINIC | Age: 89
End: 2025-03-18

## 2025-03-29 DIAGNOSIS — E03.9 ACQUIRED HYPOTHYROIDISM: ICD-10-CM

## 2025-04-01 DIAGNOSIS — E03.9 ACQUIRED HYPOTHYROIDISM: ICD-10-CM

## 2025-04-01 RX ORDER — LEVOTHYROXINE SODIUM 75 MCG
75 TABLET ORAL
Qty: 90 TABLET | Refills: 0 | OUTPATIENT
Start: 2025-04-01

## 2025-04-03 RX ORDER — LEVOTHYROXINE SODIUM 75 MCG
75 TABLET ORAL
Qty: 90 TABLET | Refills: 0 | Status: SHIPPED | OUTPATIENT
Start: 2025-04-03

## 2025-04-03 NOTE — TELEPHONE ENCOUNTER
Name from pharmacy: SYNTHROID 0.075MG (75MCG)TABLETS          Will file in chart as: SYNTHROID 75 MCG Oral Tab     Possible duplicate: Hover to review recent actions on this medication    Sig: TAKE 1 TABLET(75 MCG) BY MOUTH BEFORE BREAKFAST    Disp: 90 tablet    Refills: 0 (Pharmacy requested: Not specified)    KESHIA    Start: 4/1/2025     Component      Latest Ref Rng 5/31/2024   T4,Free (Direct)      0.8 - 1.7 ng/dL 1.1    TSH      0.358 - 3.740 mIU/mL 2.190      LOV 7/15/24   Medication Detail    Medication Quantity Refills Start End   SYNTHROID 75 MCG Oral Tab 90 tablet 0 7/19/2024 --   Sig:   Take 1 tablet (75 mcg total) by mouth before breakfast.     Route:   Oral     KESHIA:   Yes     Order #:   [id number]         Called and spoke with pt. Pt stated she is not seeing specialist and Dr. SANCHEZ is managing this medication. Pt stated she ran out of medication last week. Confirmed still taking 75mcg. Stated date on pill bottle is 11/19/25, 90 days. Pt is completely out of medication. Refill sent.

## 2025-04-05 DIAGNOSIS — F33.8 SEASONAL DEPRESSION: ICD-10-CM

## 2025-04-07 RX ORDER — OMEPRAZOLE 40 MG/1
40 CAPSULE, DELAYED RELEASE ORAL DAILY
Qty: 90 CAPSULE | Refills: 1 | Status: SHIPPED | OUTPATIENT
Start: 2025-04-07

## 2025-04-08 RX ORDER — APIXABAN 5 MG/1
5 TABLET, FILM COATED ORAL 2 TIMES DAILY
Qty: 60 TABLET | Refills: 2 | Status: SHIPPED | OUTPATIENT
Start: 2025-04-08 | End: 2025-07-14

## 2025-04-09 NOTE — TELEPHONE ENCOUNTER
Please review.  Protocol failed/has no protocol.      Last Office Visit:07/15/2024  Please advise if refill is appropriate.    Requested Prescriptions     Pending Prescriptions Disp Refills    FLUOXETINE 20 MG Oral Cap [Pharmacy Med Name: FLUOXETINE 20MG CAPSULES] 90 capsule 1     Sig: TAKE 1 CAPSULE(20 MG) BY MOUTH DAILY

## 2025-04-11 DIAGNOSIS — E78.00 PURE HYPERCHOLESTEROLEMIA: ICD-10-CM

## 2025-04-15 RX ORDER — SIMVASTATIN 20 MG
20 TABLET ORAL EVERY EVENING
Qty: 90 TABLET | Refills: 3 | Status: SHIPPED | OUTPATIENT
Start: 2025-04-15

## 2025-04-15 NOTE — TELEPHONE ENCOUNTER
Please review.  Protocol failed/has no protocol.    Last Office Visit: 07/15/2024  No active /future labs noted   Please advise if refill is appropriate.    Requested Prescriptions     Pending Prescriptions Disp Refills    SIMVASTATIN 20 MG Oral Tab [Pharmacy Med Name: SIMVASTATIN 20MG TABLETS] 90 tablet 0     Sig: TAKE 1 TABLET(20 MG) BY MOUTH EVERY EVENING

## 2025-05-02 NOTE — TELEPHONE ENCOUNTER
Notified the patient of the below lab results. Patient verbalized understanding. Answered all questions at this time. Please review. Refill failed protocol.

## 2025-05-22 DIAGNOSIS — M17.11 PRIMARY OSTEOARTHRITIS OF RIGHT KNEE: ICD-10-CM

## 2025-05-23 RX ORDER — METOPROLOL SUCCINATE 50 MG/1
50 TABLET, EXTENDED RELEASE ORAL DAILY
Qty: 90 TABLET | Refills: 3 | Status: SHIPPED | OUTPATIENT
Start: 2025-05-23

## 2025-05-27 ENCOUNTER — OFFICE VISIT (OUTPATIENT)
Age: 89
End: 2025-05-27
Attending: INTERNAL MEDICINE
Payer: MEDICARE

## 2025-05-27 VITALS
HEIGHT: 64.02 IN | WEIGHT: 208 LBS | TEMPERATURE: 97 F | BODY MASS INDEX: 35.51 KG/M2 | SYSTOLIC BLOOD PRESSURE: 124 MMHG | RESPIRATION RATE: 18 BRPM | OXYGEN SATURATION: 100 % | HEART RATE: 73 BPM | DIASTOLIC BLOOD PRESSURE: 76 MMHG

## 2025-05-27 DIAGNOSIS — Z79.01 CHRONIC ANTICOAGULATION: ICD-10-CM

## 2025-05-27 DIAGNOSIS — I82.90 VTE (VENOUS THROMBOEMBOLISM): ICD-10-CM

## 2025-05-27 DIAGNOSIS — C34.92 SQUAMOUS CELL CARCINOMA OF LEFT LUNG (HCC): ICD-10-CM

## 2025-05-27 DIAGNOSIS — C34.12 SMALL CELL CARCINOMA OF UPPER LOBE OF LEFT LUNG (HCC): Primary | ICD-10-CM

## 2025-05-27 DIAGNOSIS — D64.9 NORMOCYTIC NORMOCHROMIC ANEMIA: ICD-10-CM

## 2025-05-27 DIAGNOSIS — N18.30 STAGE 3 CHRONIC KIDNEY DISEASE, UNSPECIFIED WHETHER STAGE 3A OR 3B CKD (HCC): ICD-10-CM

## 2025-05-27 DIAGNOSIS — C67.8 MALIGNANT NEOPLASM OF OVERLAPPING SITES OF BLADDER (HCC): ICD-10-CM

## 2025-05-27 NOTE — PROGRESS NOTES
Here for MD fu visit for h/o lung ca  Arthritis pain  Dyspnea with exertion   No CP  No HA, dizziness   Education Record     Learner:  Patient     Disease / Diagnosis: lung ca     Barriers / Limitations: none                 Comments:     Method:  Discussion                Comments:     General Topics:  Plan of care reviewed                Comments:     Outcome:  Shows understanding                Comments:  CT and MRI order given to pt with instructions and phone number to schedule

## 2025-05-27 NOTE — PROGRESS NOTES
Cancer Center Progress Note    Patient Name: Gillian Chávez   YOB: 1935   Medical Record Number: [id number]   CSN: [id number]   Attending Physician: Yanira Rousseau M.D.   Referring Physician: MD Dr. Sujit Byrd Dr., Dr., Dr., Dr.    Date of Visit: 8/13/2024     Chief Complaint:      Hem/Onc History:  1. Lung cancer x 2 s/p left VATS upper and lower lobe wedge resections at [facility] on 1/24/22. 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     - On 3/9/21 she presented with SOB and went to [facility] ED 3/9/21. CTA chest was done which showed no PE but did show a KAILYN and LLL nodule.     Given the presence of the 2 lung nodules she was referred to Pulmonary and saw Dr. Marks. She was recommended continued monitoring of the lung nodules with a repeat CT. This was done 4/2021 which showed the KAILYN nodule was smaller (10 x 7.7 mm -->6 x 9 mm) and the LLL nodule was slightly larger (8 x 3.8 mm--> 8.7 x 6.4 mm). She had a f/u CT 9/3/21 which showed LLL nodule increased to 10 mm and the KAILYN nodule had diminished in size. PET scan was subsequently done which showed uptake corresponding to the lung nodules- 5 mm lingular nodule SUV of 2.94, 9 mm LLL nodule SUV of 3.8. She was then referred to Thoracic surgery. She saw Dr. Murdock who recommended diagnostic wedge resection of increasing LLL nodule. Surgery was apparently delayed due to persistent anemia. She then had a f/u CT 1/7/22 which showed LULand LLL  nodules have increased in size. She was recommended wedge resection of both nodules.      - Received her first cycle of carboplatin and etoposide (adjuvant therapy for resected SCLC) 2/22/22      - Completed 4 cycles 5/4/22.      - Met with Rad Onc to discuss PCI. She decided not to pursue.       2. 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 [place] to probably provoked with recent flight to [place]. Saw my colleague Dr. Arreguin at that time. LA panel was done which was negative. She stopped Eliquis around 1/2020.     - Most recent VTE was in 2/2021. She underwent back surgery for spinal stenosis with radiculopathy 1/27/21 at [facility]. She was discharged 1/30/21. She then presented with acute SOB and was seen at [facility] 2/8/21. W/u revealed saddle PE and BLE DVTs. She was placed on a heparin drip and eventually transitioned to Xarelto at discharge. CT imaging done at that time revealed a 1.4 cm KAILYN nodule.     She then developed dark stools and anemia (Hb was 8.6) and was readmitted 2/23/21 at [facility]. Her Xarelto was held and underwent EGD/colonoscopy. Per records no source of bleeding was found though prep was poor. She was given Venofer and started on Eliquis. She was discharged home on oral iron.       3. h/o low grade papillary urothelial carcinoma and underwent TURBT in 2015. Received mitomycin x 6 completed 3/2/2016    4. H/o CPPD, gout and psoriatic arthritis.       She was seen by one of my colleagues Dr. Arreguin and was also followed [facility]. Transferred care 2/2022      History of Present Illness:   Saw was told she had stage 3 kidney disease. Follows with renal. He manages her HTN and edema as well. She c/o chronic joint and back pain \"I have 4 kinds of arthritis\".  No nausea or vomiting.  Denies chest or abdominal pain, change in bowel or urinary habits, headaches, dizziness or visual symptoms. Stable MACDONALD.       Past Medical History:  Past Medical History:    Arthritis    psoriatic    Back problem    Bladder cancer (HCC)    TURBT - Dr. Modi    Disorder of thyroid    High blood pressure    High cholesterol    Hypothyroid    Incontinence of urine    Lung cancer (HCC)    Small cell lung ca    Osteoarthritis    Other and unspecified hyperlipidemia     Unspecified essential hypertension       Past Surgical History:  Past Surgical History:   Procedure Laterality Date    Anesth,knee area surgery      arthrocentesis aspiration of knee joint    Cataract      Colonoscopy      Cystourethroscopy,fulgur 2-5cm lesn Right 2015    Procedure: CYSTOSCOPY WITH RETROGRADE PYELOGRAM;  Surgeon: Pranay Modi MD;  Location: [facility]    Cystourethroscopy,fulgur <.5cm lesn N/A 2015    Procedure: CYSTOSCOPY TRANSURETHRAL RESECTION OF BLADDER TUMOR W/ MITOMYCIN;  Surgeon: Pranay Modi MD;  Location: [facility]    Hysterectomy  76    ZELALEM-BSO    Instill anticancer agent in bladder Right 2015    Procedure: CYSTOSCOPY WITH RETROGRADE PYELOGRAM;  Surgeon: Pranay Modi MD;  Location: [facility]    Mitomycin 5 mg inj N/A 2015    Procedure: CYSTOSCOPY TRANSURETHRAL RESECTION OF BLADDER TUMOR W/ MITOMYCIN;  Surgeon: Pranay Modi MD;  Location: [facility]    Oophorectomy  1974    Other  99    gastrotomy with suture of bleeding ulcer    Other surgical history  12/1/15    BTS Cystdat Modi    Other surgical history  16    BTS Hector Modi    Other surgical history  16    BTS Hector Modi    Other surgical history  2017    BTS maurizio Modi    Other surgical history  2019    BTS Hector Modi    Other surgical history      neck surgery    Other surgical history  2020    Maurizio Modi     Other surgical history      Lung       Family Medical History:  Family History   Problem Relation Age of Onset    Cancer Father         liver    Diabetes Father     Heart Disorder Mother     Diabetes Mother     Diabetes Sister     Heart Disorder Sister     Diabetes Brother     Cancer Maternal Grandmother         ? colon cancer       Gyne History:  OB History    Para Term  AB Living   0 0 0 0 0 0   SAB IAB Ectopic Multiple Live Births   0 0 0 0 0    Obstetric Comments   Children are adopted   Menarche: 15 y/o   Menopause due to ZELALEM-BSO in  due to heavy bleeding/fibroids       Psychosocial History:  Social History     Socioeconomic History    Marital status:      Spouse name: Not on file    Number of children: 2    Years of education: Not on file    Highest education level: Not on file   Occupational History    Occupation: RETIRED     Tobacco Use    Smoking status: Former     Current packs/day: 0.00     Average packs/day: 1 pack/day for 44.0 years (44.0 ttl pk-yrs)     Types: Cigarettes     Start date: 1957     Quit date: 2001     Years since quittin.3    Smokeless tobacco: Never   Vaping Use    Vaping status: Never Used   Substance and Sexual Activity    Alcohol use: Not Currently     Alcohol/week: 2.0 standard drinks of alcohol     Types: 2 Glasses of wine per week     Comment: daily - none recently    Drug use: No    Sexual activity: Not Currently     Partners: Male     Birth control/protection: Hysterectomy   Other Topics Concern     Service Not Asked    Blood Transfusions Not Asked    Caffeine Concern Yes     Comment: coffee 1 cup daily    Occupational Exposure Not Asked    Hobby Hazards Not Asked    Sleep Concern Not Asked    Stress Concern Not Asked    Weight Concern Not Asked    Special Diet Not Asked    Back Care Not Asked    Exercise No    Bike Helmet Not Asked    Seat Belt Yes    Self-Exams Not Asked   Social History Narrative    , lives alone    Has 1 son - in [place]    Has 1 daughter - lives close by     Social Drivers of Health     Food Insecurity: Not on file   Transportation Needs: Not on file   Housing Stability: Low Risk  (2021)    Received from [facility]    Housing Stability     Mortgage Payment Concerns?: Not on file     Number of Places Lived in the Last Year: Not on file     Unstable Housing?: Not on file       Allergies:   Allergies   Allergen Reactions     Diazepam OTHER (SEE COMMENTS)     Other reaction(s): Other    Amoxil RASH     TABS    Augmentin, [Amoxicillin-Pot Clavulanate] UNKNOWN    Cephalosporins      hives    Penicillins      hives    Codeine RASH       Current Medications:  Current Outpatient Medications on File Prior to Visit   Medication Sig Dispense Refill    metoprolol succinate ER 50 MG Oral Tablet 24 Hr Take 1 tablet (50 mg total) by mouth daily. 90 tablet 3    SIMVASTATIN 20 MG Oral Tab TAKE 1 TABLET(20 MG) BY MOUTH EVERY EVENING 90 tablet 3    FLUOXETINE 20 MG Oral Cap TAKE 1 CAPSULE(20 MG) BY MOUTH DAILY 90 capsule 3    ELIQUIS 5 MG Oral Tab TAKE 1 TABLET(5 MG) BY MOUTH TWICE DAILY 60 tablet 2    OMEPRAZOLE 40 MG Oral Capsule Delayed Release TAKE 1 CAPSULE(40 MG) BY MOUTH DAILY 90 capsule 1    SYNTHROID 75 MCG Oral Tab TAKE 1 TABLET(75 MCG) BY MOUTH BEFORE BREAKFAST 90 tablet 0    FOLIC ACID 1 MG Oral Tab TAKE 1 TABLET BY MOUTH EVERY DAY 90 tablet 0    POTASSIUM CHLORIDE ER 20 MEQ Oral Tab CR TAKE 1 TABLET BY MOUTH DAILY 90 tablet 0    metroNIDAZOLE 1 % External Gel APPLY TO FACE AS NEEDED FOR FLARING      FUROSEMIDE 20 MG Oral Tab TAKE 1 TABLET(20 MG) BY MOUTH DAILY 90 tablet 3    Nystatin (NYSTOP) 748335 UNIT/GM External Powder Apply topically every 6 (six) hours as needed.      ANORO ELLIPTA 62.5-25 MCG/ACT Inhalation Aerosol Powder, Breath Activated Inhale 1 puff into the lungs daily.      clobetasol 0.05 % External Solution Apply 1 Application topically daily as needed (to scalp).      cholecalciferol 25 MCG (1000 UT) Oral Tab Take 1 tablet (1,000 Units total) by mouth daily.      Acetaminophen ER (ACETAMINOPHEN 8 HOUR) 650 MG Oral Tab CR Take 1-2 tablets (650-1,300 mg total) by mouth 2 (two) times daily.      allopurinol 300 MG Oral Tab Take 1 tablet (300 mg total) by mouth daily.      valsartan 80 MG Oral Tab Take 1 tablet (80 mg total) by mouth daily.      Magnesium Oxide 400 (240 Mg) MG Oral Tab Take 1 tablet (400 mg total) by mouth  daily.      albuterol (2.5 MG/3ML) 0.083% Inhalation Nebu Soln Take 3 mL (2.5 mg total) by nebulization every 4 (four) hours as needed.      clobetasol 0.05 % External Cream Apply topically daily as needed.      FEROSUL 325 (65 Fe) MG Oral Tab Take 1 tablet (325 mg total) by mouth daily with breakfast.      PREVIDENT 5000 PLUS 1.1 % Dental Cream       Calcium Carb-Cholecalciferol (CALTRATE 600+D3 OR) Take 1 tablet by mouth daily.      TraMADol HCl 50 MG Oral Tab Take 1 tablet (50 mg total) by mouth every 8 (eight) hours as needed for Pain.  0     No current facility-administered medications on file prior to visit.       Review of Systems:  A 14-point ROS was done with pertinent positives and negative per the HPI    Vital Signs:  /76 (BP Location: Left arm, Patient Position: Sitting, Cuff Size: large)   Pulse 73   Temp 97.1 °F (36.2 °C) (Tympanic)   Resp 18   Ht 1.626 m (5' 4.02\")   Wt 94.3 kg (208 lb)   SpO2 100%   BMI 35.69 kg/m²     Physical Examination:  General: Patient is alert and oriented x 3, not in acute distress.   HEENT: EOMs intact. PERRL. Oropharynx clear   Neck: No JVD. No palpable lymphadenopathy. Neck is supple.  Chest: Clear to auscultation. Incision sites left well healed   Heart: Regular  Abdomen: Soft, non tender with good bowel sounds.  Extremities: Pedal pulses are present. BLE edema. Deformities PIP joints with some swelling.   Neurological: Grossly intact.   Lymphatics: There is no palpable lymphadenopathy throughout in the cervical, supraclavicular, axillary, or inguinal regions.  Psych/Depression: Mood and affect are appropriate.    Laboratory:  Component  Ref Range & Units 8 mo ago   Leukocytes, Blood  3.0 - 11.0 x10(9)/L 9.5   Leukocytes corrected for nucleated erythrocytes, Blood  3.0 - 11.0 x10(9)/L 9.5   Erythrocytes (RBC)  3.00 - 6.00 x10^12/L 3.90   Hemoglobin (HGB)  11.0 - 16.0 gm/dL 12.3   Hematocrit (HCT)  34.0 - 48.0 % 39.1   MCV  80.0 - 99.0 fL 100.3 High     MCH  23.0 - 34.0 pg 31.5   MCHC  28.0 - 37.5 gm/dL 31.5   Erythrocyte Distribution Width (RDW)  11.0 - 15.0 % 14.6   Platelet Ab, Serum  140 - 450 x10(9)/L 208   MPV  9.6 - 12.6 fL 11.0       Lab Results   Component Value Date    WBC 6.6 07/15/2024    RBC 3.58 (L) 07/15/2024    HGB 11.4 (L) 07/15/2024    HCT 35.6 07/15/2024    .0 07/15/2024    MCV 99.4 07/15/2024    MCH 31.8 07/15/2024    MCHC 32.0 07/15/2024    RDW 15.4 07/15/2024    NEPRELIM 3.59 07/15/2024    NEUTABS 9.17 (H) 05/03/2022    LYMPHABS 1.57 05/03/2022    EOSABS 0.13 05/03/2022    BASABS 0.00 05/03/2022    NEUT 68 05/03/2022    LYMPH 12 05/03/2022    MON 16 05/03/2022    EOS 1 05/03/2022    BASO 0 05/03/2022    NEPERCENT 54.8 07/15/2024    LYPERCENT 25.8 07/15/2024    MOPERCENT 10.5 07/15/2024    EOPERCENT 7.2 07/15/2024    BAPERCENT 1.2 07/15/2024    NE 3.59 07/15/2024    LYMABS 1.69 07/15/2024    MOABSO 0.69 07/15/2024    EOABSO 0.47 07/15/2024    BAABSO 0.08 07/15/2024     Lab Results   Component Value Date     (H) 07/15/2024    BUN 32 (H) 07/15/2024    BUNCREA 29.5 (H) 05/25/2021    CREATSERUM 1.49 (H) 07/15/2024    ANIONGAP 10 07/15/2024    GFR 52 (L) 04/05/2017    GFRNAA 41 (L) 07/18/2022    GFRAA 47 (L) 07/18/2022    CA 9.4 07/15/2024    OSMOCALC 303 (H) 07/15/2024    ALKPHO 70 07/15/2024    AST 23 07/15/2024    ALT 23 07/15/2024    BILT 0.6 07/15/2024    TP 6.3 (L) 07/15/2024    ALB 3.3 (L) 07/15/2024    GLOBULIN 3.0 07/15/2024    AGRATIO 1.6 07/22/2011     07/15/2024    K 4.2 07/15/2024     (H) 07/15/2024    CO2 17.0 (L) 07/15/2024     Lab Component   Component Value Date/Time     07/15/2024 0937       Radiology:  PROCEDURE:  MRI BRAIN (W+WO) (CPT=70553)      COMPARISON:  [facility], MR, MRI BRAIN (W+WO) (CPT=70553), 10/02/2023, 11:19 AM.      INDICATIONS:  C34.92 Squamous cell carcinoma of left lung (HCC) C34.12 Small cell carcinoma of upper lobe of left lung (HCC)      TECHNIQUE:  MRI of the brain was  performed with multi-planar T1, T2-weighted images with FLAIR sequences and diffusion weighted images without and with infusion.      PATIENT STATED HISTORY:(As transcribed by Technologist)  Small cell carcinoma of upper lobe of left lung       CONTRAST USED:  20 mL of Dotarem      FINDINGS:   Patient motion noted.       Stable mild global brain parenchymal volume loss without overt hydrocephalus.  There is no midline shift or mass effect.  The basal cisterns are patent.  The gray-white matter differentiation is intact.  The craniocervical junction is unremarkable.       Stable mild chronic microvascular ischemic changes in the cerebral white matter and lissette.  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      There is no acute intracranial hemorrhage or extra-axial fluid collection identified.  No significant abnormal parenchymal gradient susceptibility. There is no abnormal parenchymal or leptomeningeal enhancement.  There is no restricted diffusion to   suggest acute ischemia/infarction.      Scattered fluid in the mastoid air cells.  Minimal ethmoid and trace maxillary mucosal thickening.  Bilateral intra-ocular lens implants.  The expected major intracranial flow voids are present.  Partially imaged cervical fusion hardware in the upper   cervical spine.         CONCLUSION:       1. No acute intracranial abnormality identified.  No specific findings to suggest intracranial metastatic disease.      2. Stable mild chronic microvascular ischemic changes in the cerebral white matter and lissette.  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      Please see above for further details.      LOCATION:  [id number]            ictated by (CST): Chandan Pratt MD on 9/26/2024 at 6:43 PM       Finalized by (CST): Chandan Pratt MD on 9/26/2024 at 6:46 PM       PROCEDURE:  CT CHEST+ABDOMEN+PELVIS(ALL CNTRST ONLY)(CPT=71260/82684)     COMPARISON:  [facility], CT, CT CHEST (CPT=71250), 11/30/2022, 9:14 AM.  [facility], CT, CT  CHEST+ABDOMEN+PELVIS(ALL CNTRST ONLY)(CPT=71260/77768), 2/26/2024, 8:27 AM.     INDICATIONS:  C34.92 Squamous cell carcinoma of left lung (HCC) C34.12 Small cell carcinoma of upper lobe of left lung (HCC)     TECHNIQUE:  IV contrast-enhanced scanning through the chest, abdomen, and pelvis was performed.  Dose reduction techniques were used. Dose information is transmitted to the ACR (American College of Radiology) NRDR (National Radiology Data Registry) which   includes the Dose Index Registry.     PATIENT STATED HISTORY:(As transcribed by Technologist)  Patient is  here for a follow up CT for history of left lung cancer.      CONTRAST USED:  100cc of Isovue 370     FINDINGS:       CHEST:    LUNGS:  Postsurgical changes in the left upper lobe are noted.  Soft tissue along the surgical suture is noted measuring up to 2.3 x 1.1 cm (image 57).  This previously measured up to 1.7 x 0.7 cm.  On 11/30/2022 the soft tissue lesion measures up to 2.6   x 1.1 cm. No additional pulmonary nodules are noted.  MEDIASTINUM:  No suspicious mass or adenopathy.  Incidental right thyroid cyst measures up to 1.3 cm.  ARI:  No mass or adenopathy.    CARDIAC:  No enlargement, pericardial thickening, or significant coronary artery calcification.  PLEURA:  No mass or effusion.    CHEST WALL:  No mass or axillary adenopathy.    AORTA:  No aneurysm or dissection.    VASCULATURE:  No visible pulmonary arterial thrombus or attenuation.       ABDOMEN/PELVIS:  LIVER:  No enlargement, atrophy, abnormal density, or significant focal lesion.    BILIARY:  No visible dilatation or calcification.    PANCREAS:  No lesion, fluid collection, ductal dilatation, or atrophy.    SPLEEN:  No enlargement or focal lesion.  Scattered granulomas are noted in the spleen.  KIDNEYS:  No mass, obstruction, or calcification.    ADRENALS:  No mass or enlargement.    AORTA:  No aneurysm or dissection.    RETROPERITONEUM:  No mass or adenopathy.    BOWEL/MESENTERY:   Colonic diverticulosis without diverticulitis.  No visible mass, obstruction, or bowel wall thickening.  Small hiatal hernia is noted.  ABDOMINAL WALL:  No mass or hernia.    URINARY BLADDER:  No visible focal wall thickening, lesion, or calculus.    PELVIC NODES:  No adenopathy.    PELVIC ORGANS:  No visible mass.  Pelvic organs appropriate for patient age.    BONES:  No bony lesion or fracture.  Postfusion changes are noted at L5-S1.  Spine stimulator is noted.                   Impression   CONCLUSION:    1. Mild nodularity along surgical suture in the left upper lobe appears mildly more pronounced from the most recent prior exam although is mildly improved from 2022. Continued attention on follow-up is recommended.  2. No evidence of metastatic disease in the abdomen and pelvis.           LOCATION:  [facility]           Dictated by (CST): Fidel Huff MD on 8/21/2024 at 2:38 PM      Finalized by (CST): Fidel Huff MD on 8/21/2024 at 2:54 PM         PROCEDURE:  CT CHEST+ABDOMEN+PELVIS(ALL CNTRST ONLY)(CPT=71260/99076)     COMPARISON:  [facility], CT, CT CHEST(CONTRAST ONLY) (CPT=71260), 8/14/2023, 5:56 PM.     INDICATIONS:  C34.92 Squamous cell carcinoma of left lung (HCC) C34.12 Small cell carcinoma of upper lobe of left lung (HCC)     TECHNIQUE:  IV contrast-enhanced scanning through the chest, abdomen, and pelvis was performed.  Dose reduction techniques were used. Dose information is transmitted to the ACR (American College of Radiology) NRDR (National Radiology Data Registry) which   includes the Dose Index Registry.     PATIENT STATED HISTORY:(As transcribed by Technologist)  Patient has a Hx of lung cancer, routine imaging assessment for stability.      CONTRAST USED:  100cc of Isovue 370     FINDINGS:       CHEST:    LUNGS:  Stable postsurgical changes in the lingula with volume loss.  Soft tissue density in the lingula measuring 1.7 x 0.8 cm is morphologically stable.  This is adjacent to sutures/surgical  clips.  MEDIASTINUM:  No mass or adenopathy.  Stable nodules in the right thyroid lobe.  ARI:  Calcified right perihilar lymph nodes.  CARDIAC:  Coronary arterial calcifications are noted.  PLEURA:  No mass or effusion.    CHEST WALL:  No mass or axillary adenopathy.    AORTA:  No aneurysm or dissection.    VASCULATURE:  No visible pulmonary arterial thrombus or attenuation.       ABDOMEN/PELVIS:  LIVER:  Scattered calcified granulomas in the liver.  BILIARY:  No visible dilatation or calcification.    PANCREAS:  No lesion, fluid collection, ductal dilatation, or atrophy.    SPLEEN:  Calcified granulomas in the spleen.  KIDNEYS:  No mass, obstruction, or calcification.    ADRENALS:  Previously noted nodule left adrenal gland is likely related to asymmetric nodular thickening of the left adrenal gland.  Overall this region measures 1.4 x 1.2 cm and is stable.  AORTA:  Vascular calcifications are noted.  RETROPERITONEUM:  No mass or adenopathy.    BOWEL/MESENTERY:  Diverticulosis of the colon without evidence of acute diverticulitis.  ABDOMINAL WALL:  No mass or hernia.    URINARY BLADDER:  No visible focal wall thickening, lesion, or calculus.    PELVIC NODES:  No adenopathy.    PELVIC ORGANS:  Sequelae of hysterectomy.  BONES:  Hardware at L5-S1.                  Impression  CONCLUSION:       1. Postoperative changes in the lingula with volume loss and scarring is stable.     2. Diverticulosis of the colon without evidence of acute diverticulitis.     3. No new suspicious nodule, mass or consolidation     4. Mild nodular thickening of the left adrenal gland is of questionable significance and is stable.             LOCATION:  [facility]           Dictated by (CST): Bassam Irving MD on 2/26/2024 at 10:18 AM      Finalized by (CST): Bassam Irving MD on 2/26/2024 at 10:25 AM      PROCEDURE:  MRI BRAIN (W+WO) (CPT=70553)     COMPARISON:  [facility], MR, MRI BRAIN (W+WO) (CPT=70553), 4/11/2023, 12:27 PM.      INDICATIONS:  C34.12 Small cell carcinoma of upper lobe of left lung (HCC) C34.92 Squamous cell carcinoma of left lung (HCC)     TECHNIQUE:  MRI of the brain was performed with multi-planar T1, T2-weighted images with FLAIR sequences and diffusion weighted images without and with infusion.     PATIENT STATED HISTORY:(As transcribed by Technologist)  Lung cancer.      CONTRAST USED:  30 mL of Dotarem     FINDINGS:  Patient motion noted.  This may obscure subtle intracranial metastatic disease.     Stable mild global brain parenchymal volume loss without overt hydrocephalus.  There is no midline shift or mass effect.  The basal cisterns are patent.  The gray-white matter differentiation is intact.  The craniocervical junction is unremarkable.       Stable mild chronic microvascular ischemic changes in the cerebral white matter and lissette.  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     There is no acute intracranial hemorrhage or extra-axial fluid collection identified.  No significant abnormal parenchymal gradient susceptibility. There is no abnormal parenchymal or leptomeningeal enhancement.  There is no restricted diffusion to  suggest acute ischemia/infarction.     Trace fluid in the mastoid air cells.  Minimal ethmoid mucosal thickening.  Bilateral intra-ocular lens implants.  The expected major intracranial flow voids are present.  Partially imaged cervical fusion hardware in the upper cervical spine.                      Impression   CONCLUSION:       1. No acute intracranial abnormality identified.  No evidence of intracranial metastatic disease within the limitations of the exam.     2. Stable mild chronic microvascular ischemic changes in the cerebral white matter and lissette.  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     Please see above for further details.     LOCATION:  [id number]        Dictated by (CST): Chandan Pratt MD on 10/02/2023 at 12:34 PM      Finalized by (CST): Chandan Pratt MD on 10/02/2023  at 12:39 PM       Impression & Plan:   1. Lung cancer x 2 s/p wedge resections (both NSCLC and SCLC)- Last seen in 8/2024. Overdue for f/u scans which I ordered last year. Last scans 8/2024 showed mild nodularity along the surgical suture in the KAILYN which appeared mildly more pronounced compared to  2/2024 but mildly improved from 2022. Promises to have this done and have her a copy of the order. Did have MRI brain in 9/2024 which showed no acute intracranial abnormality.     2. OA, psoriatric arthritis, pseudogout- followed by rheum. On hydroxychloroquine. Not a candidate for NSAIDs given CKD. Had not wanted to pursue biologics in the past but now considering. On allopurinol    3. Anemia- from previous chemo and likely some component from CKD. Normal from outside recent labs    4. H/o low grade papillary urothelial cancer- follows with . Last cysto was 1/21/2020 which was negative. Behind with f/u with  and cysto.  She will contact them for an appointment    5. 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 Dr. Couch/Lindsay Silva NP of [facility] followed her for this     6. CKD- followed by nephrology in [place] (Dr. Person)    7. Chronic LBP followed at the pain clinic. Says no interventions have helped and just has \"to live with it\".     8. COPD on Spiriva and albuterol.  No recent COPD exacerbations.       Labs and imaging reviewed    Due for CT C/A/P and (overdue) . If scans good only needs yearly  MRI brain in September- ordered    RTC 1 year- per her preference.     Yanira Rousseau MD  [facility]

## 2025-06-23 ENCOUNTER — TELEPHONE (OUTPATIENT)
Age: 89
End: 2025-06-23

## 2025-06-23 NOTE — TELEPHONE ENCOUNTER
FMLA forms for patient daughter received in forms dept- Logged for processing  -- Valid authorization on file for [facility]

## 2025-06-30 DIAGNOSIS — E03.9 ACQUIRED HYPOTHYROIDISM: ICD-10-CM

## 2025-07-01 NOTE — TELEPHONE ENCOUNTER
Dr. Rousseau      Please sign off on form if you agree to: FMLA for patient daughter  Start: 6/16/25  End: 12/15/25  2 appts per week each lasting 8 hours  2 flare ups per week each episode lasting 8 hours per day or 2 days   (place your signature on the first page only)    -From your Inbasket, Highlight the patient and click Chart   -Double click the 6/23/25 Forms Completion telephone encounter  -Scroll down to the Media section   -Click the blue Hyperlink: MANJINDER Rousseau 7/1/25  -Click Acknowledge located in the top right ribbon/menu   -Drag the mouse into the blank space of the document and a + sign will appear. Left click to   electronically sign the document.     Thank you,    Lucille PENA

## 2025-07-02 RX ORDER — LEVOTHYROXINE SODIUM 75 MCG
75 TABLET ORAL
Qty: 90 TABLET | Refills: 0 | Status: SHIPPED | OUTPATIENT
Start: 2025-07-02

## 2025-07-02 NOTE — TELEPHONE ENCOUNTER
Refill per protocol           Thyroid Medication Protocol Passed   SYNTHROID 75 MCG Oral Tab [Pharmacy Med Name: SYNTHROID 0.075MG (75MCG)TABLETS]  6/30/2025  4:17 PM    TSH in past 12 months    Last TSH value is normal    In person appointment or virtual visit in the past 12 mos or appointment in next 3 mos    Medication is active on med list

## 2025-07-07 NOTE — TELEPHONE ENCOUNTER
Tried calling patient to advise FMLA form were completed no answer no ring call drops to dial tone --  forms faxed to [facility] confirmation received

## 2025-07-24 RX ORDER — POTASSIUM CHLORIDE 1500 MG/1
1 TABLET, EXTENDED RELEASE ORAL DAILY
Qty: 90 TABLET | Refills: 0 | Status: SHIPPED | OUTPATIENT
Start: 2025-07-24

## 2025-07-24 NOTE — TELEPHONE ENCOUNTER
LOV:7/15/24  Last fill 12/17/24  Last CMP: 3/10/25 Potassium - 4.5    samir Navarrete to fill Potassium or wait till OV 8/7/25 to discuss?

## 2025-08-07 ENCOUNTER — LAB ENCOUNTER (OUTPATIENT)
Dept: LAB | Age: 89
End: 2025-08-07
Attending: FAMILY MEDICINE

## 2025-08-07 ENCOUNTER — OFFICE VISIT (OUTPATIENT)
Dept: FAMILY MEDICINE CLINIC | Facility: CLINIC | Age: 89
End: 2025-08-07

## 2025-08-07 VITALS
BODY MASS INDEX: 33.97 KG/M2 | WEIGHT: 199 LBS | DIASTOLIC BLOOD PRESSURE: 56 MMHG | SYSTOLIC BLOOD PRESSURE: 124 MMHG | HEART RATE: 64 BPM | HEIGHT: 64.2 IN | OXYGEN SATURATION: 97 %

## 2025-08-07 DIAGNOSIS — M1A.09X1 IDIOPATHIC CHRONIC GOUT OF MULTIPLE SITES WITH TOPHUS: ICD-10-CM

## 2025-08-07 DIAGNOSIS — B35.1 ONYCHOMYCOSIS: ICD-10-CM

## 2025-08-07 DIAGNOSIS — J41.0 SIMPLE CHRONIC BRONCHITIS (HCC): ICD-10-CM

## 2025-08-07 DIAGNOSIS — I10 ESSENTIAL HYPERTENSION, BENIGN: ICD-10-CM

## 2025-08-07 DIAGNOSIS — E55.9 VITAMIN D DEFICIENCY: ICD-10-CM

## 2025-08-07 DIAGNOSIS — M47.816 LUMBAR SPONDYLOSIS: ICD-10-CM

## 2025-08-07 DIAGNOSIS — R06.02 SOB (SHORTNESS OF BREATH): ICD-10-CM

## 2025-08-07 DIAGNOSIS — E03.9 ACQUIRED HYPOTHYROIDISM: ICD-10-CM

## 2025-08-07 DIAGNOSIS — E78.00 PURE HYPERCHOLESTEROLEMIA: ICD-10-CM

## 2025-08-07 DIAGNOSIS — F43.21 ADJUSTMENT DISORDER WITH DEPRESSED MOOD: ICD-10-CM

## 2025-08-07 DIAGNOSIS — Z00.00 MEDICARE ANNUAL WELLNESS VISIT, SUBSEQUENT: ICD-10-CM

## 2025-08-07 DIAGNOSIS — L40.50 PSORIATIC ARTHRITIS (HCC): ICD-10-CM

## 2025-08-07 DIAGNOSIS — I87.2 EDEMA OF BOTH LOWER LEGS DUE TO PERIPHERAL VENOUS INSUFFICIENCY: ICD-10-CM

## 2025-08-07 DIAGNOSIS — R60.0 EDEMA OF BOTH LOWER LEGS DUE TO PERIPHERAL VENOUS INSUFFICIENCY: ICD-10-CM

## 2025-08-07 DIAGNOSIS — K21.9 GASTROESOPHAGEAL REFLUX DISEASE WITHOUT ESOPHAGITIS: ICD-10-CM

## 2025-08-07 DIAGNOSIS — M17.0 PRIMARY OSTEOARTHRITIS OF BOTH KNEES: ICD-10-CM

## 2025-08-07 DIAGNOSIS — M25.50 ARTHRALGIA OF MULTIPLE JOINTS: Primary | ICD-10-CM

## 2025-08-07 DIAGNOSIS — E66.811 OBESITY (BMI 30.0-34.9): ICD-10-CM

## 2025-08-07 DIAGNOSIS — L40.9 PSORIASIS: ICD-10-CM

## 2025-08-07 DIAGNOSIS — M06.09 RHEUMATOID ARTHRITIS OF MULTIPLE SITES WITH NEGATIVE RHEUMATOID FACTOR (HCC): ICD-10-CM

## 2025-08-07 DIAGNOSIS — N18.32 STAGE 3B CHRONIC KIDNEY DISEASE (HCC): ICD-10-CM

## 2025-08-07 DIAGNOSIS — C34.12 SMALL CELL CARCINOMA OF UPPER LOBE OF LEFT LUNG (HCC): ICD-10-CM

## 2025-08-07 DIAGNOSIS — Z98.890 HISTORY OF LUMBAR SURGERY: ICD-10-CM

## 2025-08-07 PROBLEM — F33.8 SEASONAL DEPRESSION: Status: RESOLVED | Noted: 2017-04-05 | Resolved: 2025-08-07

## 2025-08-07 LAB
ALBUMIN SERPL-MCNC: 4.2 G/DL (ref 3.2–4.8)
ALBUMIN/GLOB SERPL: 1.8 (ref 1–2)
ALP LIVER SERPL-CCNC: 93 U/L (ref 55–142)
ALT SERPL-CCNC: 11 U/L (ref 10–49)
ANION GAP SERPL CALC-SCNC: 14 MMOL/L (ref 0–18)
AST SERPL-CCNC: 20 U/L (ref ?–34)
BASOPHILS # BLD AUTO: 0.07 X10(3) UL (ref 0–0.2)
BASOPHILS NFR BLD AUTO: 0.8 %
BILIRUB SERPL-MCNC: 0.5 MG/DL (ref 0.2–1.1)
BUN BLD-MCNC: 24 MG/DL (ref 9–23)
CALCIUM BLD-MCNC: 10.3 MG/DL (ref 8.7–10.6)
CHLORIDE SERPL-SCNC: 110 MMOL/L (ref 98–112)
CHOLEST SERPL-MCNC: 146 MG/DL (ref ?–200)
CO2 SERPL-SCNC: 18 MMOL/L (ref 21–32)
CREAT BLD-MCNC: 1.74 MG/DL (ref 0.55–1.02)
EGFRCR SERPLBLD CKD-EPI 2021: 28 ML/MIN/1.73M2 (ref 60–?)
EOSINOPHIL # BLD AUTO: 0.5 X10(3) UL (ref 0–0.7)
EOSINOPHIL NFR BLD AUTO: 5.5 %
ERYTHROCYTE [DISTWIDTH] IN BLOOD BY AUTOMATED COUNT: 15.1 %
FASTING PATIENT LIPID ANSWER: YES
FASTING STATUS PATIENT QL REPORTED: YES
GLOBULIN PLAS-MCNC: 2.4 G/DL (ref 2–3.5)
GLUCOSE BLD-MCNC: 99 MG/DL (ref 70–99)
HCT VFR BLD AUTO: 32.9 % (ref 35–48)
HDLC SERPL-MCNC: 49 MG/DL (ref 40–59)
HGB BLD-MCNC: 10.4 G/DL (ref 12–16)
IMM GRANULOCYTES # BLD AUTO: 0.03 X10(3) UL (ref 0–1)
IMM GRANULOCYTES NFR BLD: 0.3 %
LDLC SERPL CALC-MCNC: 65 MG/DL (ref ?–100)
LYMPHOCYTES # BLD AUTO: 2.46 X10(3) UL (ref 1–4)
LYMPHOCYTES NFR BLD AUTO: 26.8 %
MCH RBC QN AUTO: 31.5 PG (ref 26–34)
MCHC RBC AUTO-ENTMCNC: 31.6 G/DL (ref 31–37)
MCV RBC AUTO: 99.7 FL (ref 80–100)
MONOCYTES # BLD AUTO: 0.92 X10(3) UL (ref 0.1–1)
MONOCYTES NFR BLD AUTO: 10 %
NEUTROPHILS # BLD AUTO: 5.19 X10 (3) UL (ref 1.5–7.7)
NEUTROPHILS # BLD AUTO: 5.19 X10(3) UL (ref 1.5–7.7)
NEUTROPHILS NFR BLD AUTO: 56.6 %
NONHDLC SERPL-MCNC: 97 MG/DL (ref ?–130)
OSMOLALITY SERPL CALC.SUM OF ELEC: 298 MOSM/KG (ref 275–295)
PLATELET # BLD AUTO: 222 10(3)UL (ref 150–450)
POTASSIUM SERPL-SCNC: 4.8 MMOL/L (ref 3.5–5.1)
PROT SERPL-MCNC: 6.6 G/DL (ref 5.7–8.2)
RBC # BLD AUTO: 3.3 X10(6)UL (ref 3.8–5.3)
SODIUM SERPL-SCNC: 142 MMOL/L (ref 136–145)
TRIGL SERPL-MCNC: 194 MG/DL (ref 30–149)
TSI SER-ACNC: 1.38 UIU/ML (ref 0.55–4.78)
URATE SERPL-MCNC: 3.7 MG/DL (ref 3.1–7.8)
VLDLC SERPL CALC-MCNC: 29 MG/DL (ref 0–30)
WBC # BLD AUTO: 9.2 X10(3) UL (ref 4–11)

## 2025-08-07 PROCEDURE — 80061 LIPID PANEL: CPT

## 2025-08-07 PROCEDURE — 36415 COLL VENOUS BLD VENIPUNCTURE: CPT

## 2025-08-07 PROCEDURE — 80053 COMPREHEN METABOLIC PANEL: CPT

## 2025-08-07 PROCEDURE — 85025 COMPLETE CBC W/AUTO DIFF WBC: CPT

## 2025-08-07 PROCEDURE — 84550 ASSAY OF BLOOD/URIC ACID: CPT

## 2025-08-07 PROCEDURE — 84443 ASSAY THYROID STIM HORMONE: CPT

## 2025-08-07 PROCEDURE — 82306 VITAMIN D 25 HYDROXY: CPT

## 2025-08-07 RX ORDER — CLOBETASOL PROPIONATE 0.5 MG/G
1 OINTMENT TOPICAL 2 TIMES DAILY
Qty: 60 G | Refills: 1 | Status: SHIPPED | OUTPATIENT
Start: 2025-08-07

## 2025-08-07 RX ORDER — FEBUXOSTAT 80 MG/1
80 TABLET, FILM COATED ORAL DAILY
Qty: 90 TABLET | Refills: 3 | Status: SHIPPED | OUTPATIENT
Start: 2025-08-07

## 2025-08-07 RX ORDER — PREDNISONE 20 MG/1
TABLET ORAL
Qty: 10 TABLET | Refills: 1 | Status: SHIPPED | OUTPATIENT
Start: 2025-08-07

## 2025-08-08 ENCOUNTER — TELEPHONE (OUTPATIENT)
Dept: FAMILY MEDICINE CLINIC | Facility: CLINIC | Age: 89
End: 2025-08-08

## 2025-08-08 LAB — VIT D+METAB SERPL-MCNC: 45.3 NG/ML (ref 30–100)

## 2025-08-18 ENCOUNTER — HOSPITAL ENCOUNTER (OUTPATIENT)
Dept: CT IMAGING | Age: 89
Discharge: HOME | End: 2025-08-18
Attending: INTERNAL MEDICINE

## 2025-08-20 ENCOUNTER — HOSPITAL ENCOUNTER (OUTPATIENT)
Dept: GENERAL RADIOLOGY | Facility: HOSPITAL | Age: 89
Discharge: HOME | End: 2025-08-20
Attending: INTERNAL MEDICINE

## 2025-08-20 ENCOUNTER — HOSPITAL ENCOUNTER (OUTPATIENT)
Dept: CT IMAGING | Facility: HOSPITAL | Age: 89
Discharge: HOME | End: 2025-08-20
Attending: INTERNAL MEDICINE

## 2025-08-20 VITALS
OXYGEN SATURATION: 98 % | DIASTOLIC BLOOD PRESSURE: 56 MMHG | SYSTOLIC BLOOD PRESSURE: 140 MMHG | RESPIRATION RATE: 13 BRPM | HEART RATE: 55 BPM

## 2025-08-20 DIAGNOSIS — C34.92 SQUAMOUS CELL CARCINOMA OF LEFT LUNG (HCC): ICD-10-CM

## 2025-08-20 DIAGNOSIS — C34.12 SMALL CELL CARCINOMA OF UPPER LOBE OF LEFT LUNG (HCC): ICD-10-CM

## 2025-08-20 PROCEDURE — 74177 CT ABD & PELVIS W/CONTRAST: CPT | Performed by: INTERNAL MEDICINE

## 2025-08-20 PROCEDURE — 71260 CT THORAX DX C+: CPT | Performed by: INTERNAL MEDICINE

## 2025-08-20 PROCEDURE — 96360 HYDRATION IV INFUSION INIT: CPT

## 2025-08-20 PROCEDURE — 96361 HYDRATE IV INFUSION ADD-ON: CPT

## 2025-08-21 ENCOUNTER — TELEPHONE (OUTPATIENT)
Facility: LOCATION | Age: 89
End: 2025-08-21

## (undated) DIAGNOSIS — M17.11 PRIMARY OSTEOARTHRITIS OF RIGHT KNEE: ICD-10-CM

## (undated) DIAGNOSIS — M10.00 IDIOPATHIC GOUT, UNSPECIFIED CHRONICITY, UNSPECIFIED SITE: Primary | ICD-10-CM

## (undated) DEVICE — NEEDLE TOUHY 18GX3-1/2

## (undated) DEVICE — AVANOS* TUOHY EPIDURAL NEEDLE: Brand: AVANOS

## (undated) DEVICE — GLOVE SURG SENSICARE SZ 6-1/2

## (undated) DEVICE — GLOVE SURG SENSICARE SZ 7

## (undated) DEVICE — Device

## (undated) DEVICE — SKIN MARKER DUAL TIP WITH RULER CAP AND LABELS: Brand: DEVON

## (undated) DEVICE — REMOVER PREP SOLUTION 4OZ

## (undated) DEVICE — PAIN TRAY: Brand: MEDLINE INDUSTRIES, INC.

## (undated) DEVICE — GLOVE SURG SENSICARE SZ 7-1/2

## (undated) DEVICE — BANDAID COVERLET 1X3

## (undated) DEVICE — LEUKOPLAST ELASTIC STERILE 25X75MM TAN 100 1"X3": Brand: LEUKOPLAST®

## (undated) DEVICE — NEEDLE SPINAL 25X3-1/2 BLUE

## (undated) DEVICE — NEEDLE SPINAL 22X5 405148

## (undated) DEVICE — BANDAGE ADH COVERLET 3X1IN

## (undated) DEVICE — CAP,BOUFFANT,SPUNBOND,BLUE,24": Brand: MEDLINE INDUSTRIES, INC.

## (undated) NOTE — Clinical Note
Pt doing well since d/c. On Eliquis for anticoagulation. Will be coming in for HFU on 9/3 with Angela Bone NP and will see hematology on 9/18. Thank you!

## (undated) NOTE — LETTER
Patient Name: Deepak Schultz  YOB: 1935          MRN :  [id number]  Date:  7/22/2021  Referring Physician: Dr. Sabrina Palacio has attended 6 visits in Physical Therapy.   Dx:  s/p L4-S1 PAULETTE on 1/27/21           Insurance (Author L 50% (25% at IE)    Gait: pt ambulates on level ground with SPC: decreased tracey, stooped posture, decreased TKE ADDI, decreased step length.   Flexibility:   LE   Hip Flexor: R MOD, L MOD  Hamstrings: R WNL; L WNL  Piriformis: R WNL; L WNL      Goals: (t Date____________________    Certification From: 4/60/6392  To:10/20/2021

## (undated) NOTE — LETTER
22  RE: Eugene Man    : 1935    Dear Dr. Moreno Forth,    Your patient is being scheduled for a pain management procedure at [facility].    Procedure:  Lumbar SANJIV  Date of Procedure: TBD  Physician: Nathan Aly- Anesthesiologist     Your patient is currently taking Eliquis.  usually recommends the medication be held for 3 days prior to injection. Please verify patient is cleared to proceed with pain management procedures. Clearance Approved   ____________    Clearance Denied       ____________      Comments: ______________________________________________________    Signature: ________________________________  Date: _________________       If you have any questions please feel free to contact our office at [phone], option # 2. Please fax this clearance request to our office at fax # [phone] or send electronically.        Thank you,      [employer]

## (undated) NOTE — ED AVS SNAPSHOT
Gagan Lazcano   MRN: [id number]    Department:  [facility] in [place]   Date of Visit:  8/30/2019           Disclosure     Insurance plans vary and the physician(s) referred by the ER may not be covered by your plan.  Please contact y tell this physician (or your personal doctor if your instructions are to return to your personal doctor) about any new or lasting problems. The primary care or specialist physician will see patients referred from the [facility] Emergency Department.  Viv Doshi

## (undated) NOTE — MR AVS SNAPSHOT
[address] [id number] [phone]               Thank you for choosing us for your health care visit with Christy Landaverde MD.  We are glad to serve you and happy to provide you with this summary of Assoc Dx: Facet arthritis of cervical region ([facility] [address] [M46.92]           INSURE FECAL GLOBIN by IMMUNOASSAY [[id number]]    Complete by:   Apr 05, 2017 (Approximate)    Assoc Dx:  Screening for colon cancer [Z12.11]           XR CHEST PA + LAT CHEST (CPT=71020)- PH:  [address] in [facility] in [address], [address] [place] [address]   [address]: [phone]    [address]: [phone] is 120 to 139 mm Hg, or your diastolic blood pressure reading is 80 to 89 mm Hg   Breast cancer All women in this age group Yearly mammogram and clinical breast exam1   Cervical cancer Only women who had abnormal screening results before age 72 Talk with y more often   Vaccine Who needs it How often   Chickenpox (varicella) All women in this age group who have no record of this infection or vaccine 2 doses; second dose should be given at least 4 weeks after the first dose   Hepatitis A Women at increased ris 73551. All rights reserved. This information is not intended as a substitute for professional medical care. Always follow your healthcare professional's instructions.              Allergies as of Apr 05, 2017     Amoxil Rash    TABS    Augmentin, [Amoxicill Generic drug:  Levothyroxine Sodium   TAKE ONE TABLET BY MOUTH ONCE DAILY           * SYNTHROID 50 MCG Tabs   Generic drug:  Levothyroxine Sodium   TAKE 1 TABLET BY MOUTH EVERY DAY           TYLENOL ARTHRITIS PAIN 650 MG Tbcr   Generic drug:  Acetaminophen the bedroom and bathroom. ? Get up slowly from lying down or sitting if you get dizzy. ? Keep a working flashlight near your bed. STAIRS:  ? Keep stairwells well lit with light switches at top and bottom. ? Install sturdy handrails on both sides. ?  Micah Silver are inactive.      HOW TO GET STARTED: HOW TO STAY MOTIVATED:   Start activities slowly and build up over time Do what you like   Get your heart pumping – brisk walking, biking, swimming Even 10 minute increments are effective and add up over the week   2 ½

## (undated) NOTE — LETTER
Patient Name: Lynn Daniels  YOB: 1935          MRN number:  [id number]  Date:  7/1/2021    SPINE EVALUATION:    Referring Physician: Dr. Mitch Grijalva  Diagnosis:  s/p L4-S1 ALIF on 1/27/21     Date of Service: 7/1/2021     PATIENT SUMMARY   S sometimes with just counter surfing. Next MD ~end of July    [place] Duane describes prior level of function as limited with walking and standing tolerance pre-surgery because of the pain in her back (used a walker and needed frequent seated rests).  Pt goals i Palpation: MOD tension ADDI thoracolumbar paraspinals with MOD tension along incisions and lumbar paraspinals.  MIN tension ADDI sacral fascia; nontender piriformis    Strength: (* denotes performed with pain)  LE   Hip flexion (L2): R 4+/5; L 4/5  Hip abduc maintain progress achieved in PT     Frequency / Duration: Patient will be seen for 2 x/week or a total of 16 visits over a 90 day period.  Treatment will include: Gait training, Manual Therapy, Neuromuscular Re-education, Therapeutic Exercise and Home Exer

## (undated) NOTE — LETTER
22  RE: Wero Rey    : 1935    Dear Dr. Jannette Shin    Your patient is being scheduled for a pain management procedure at [facility].    Procedure:Lumbar Facet Injections Bilateral L2/3, L3/4, L4/5  Date of Procedure: TBD  Physician: Diaz Board- Anesthesiologist     Your patient is currently taking Eliquis.  usually recommends the medication be held for 3 days prior to injection. Please verify patient is cleared to proceed with pain management procedures. Clearance Approved   ____________    Clearance Denied       ____________      Comments: ______________________________________________________    Signature: ________________________________  Date: _________________       If you have any questions please feel free to contact our office at [phone], option # 2. Please fax this clearance request to our office at fax # [phone] or send electronically.        Thank you,      [employer]

## (undated) NOTE — LETTER
22    RE: Flavio Fearing    : 1935    Dear Dr. Tirso Jiang,    Your patient is being scheduled for a pain management procedure at [facility] within the next 4 weeks. Procedure:  left C4, 5, 6 Medial Branch Block AND Lumbar SANJIV  Physician: - Anesthesiologist     Your patient is currently taking apixaban ,  usually recommends the medication be held for 3 days prior to injection. Please verify patient is cleared to proceed with pain management procedures. Clearance Approved   ____________    Clearance Denied       ____________      Comments: ______________________________________________________    Signature: ________________________________  Date: _________________       If you have any questions please feel free to contact our office at [phone], option # 2. Please fax this clearance request to our office at fax # [phone] or send electronically.        Thank you,      [employer]